# Patient Record
Sex: FEMALE | Race: WHITE | NOT HISPANIC OR LATINO | Employment: OTHER | URBAN - METROPOLITAN AREA
[De-identification: names, ages, dates, MRNs, and addresses within clinical notes are randomized per-mention and may not be internally consistent; named-entity substitution may affect disease eponyms.]

---

## 2017-03-03 ENCOUNTER — TELEPHONE (OUTPATIENT)
Dept: FAMILY MEDICINE CLINIC | Facility: CLINIC | Age: 82
End: 2017-03-03

## 2017-03-03 NOTE — TELEPHONE ENCOUNTER
Pt called on 3/1/17 i tried calling pt back and no answer. Pt called back on 3/3/17 and left another message wanting refills on medication. I tried calling her twice and no answer. Pt is needing appt to have medication refilled.

## 2017-03-08 ENCOUNTER — OFFICE VISIT (OUTPATIENT)
Dept: FAMILY MEDICINE CLINIC | Facility: CLINIC | Age: 82
End: 2017-03-08

## 2017-03-08 VITALS
HEART RATE: 64 BPM | SYSTOLIC BLOOD PRESSURE: 130 MMHG | DIASTOLIC BLOOD PRESSURE: 72 MMHG | BODY MASS INDEX: 23.92 KG/M2 | OXYGEN SATURATION: 97 % | TEMPERATURE: 97.5 F | RESPIRATION RATE: 16 BRPM | HEIGHT: 63 IN | WEIGHT: 135 LBS

## 2017-03-08 DIAGNOSIS — I10 ESSENTIAL HYPERTENSION: Primary | ICD-10-CM

## 2017-03-08 DIAGNOSIS — F41.9 ANXIETY: ICD-10-CM

## 2017-03-08 DIAGNOSIS — E03.9 HYPOTHYROIDISM, UNSPECIFIED TYPE: ICD-10-CM

## 2017-03-08 DIAGNOSIS — R79.89 ELEVATED PTHRP LEVEL: ICD-10-CM

## 2017-03-08 DIAGNOSIS — E83.52 CALCIUM BLOOD INCREASED: ICD-10-CM

## 2017-03-08 DIAGNOSIS — E04.1 THYROID NODULE: ICD-10-CM

## 2017-03-08 DIAGNOSIS — E78.2 MIXED HYPERLIPIDEMIA: ICD-10-CM

## 2017-03-08 PROCEDURE — 99213 OFFICE O/P EST LOW 20 MIN: CPT | Performed by: PHYSICIAN ASSISTANT

## 2017-03-08 RX ORDER — PAROXETINE 10 MG/1
10 TABLET, FILM COATED ORAL EVERY MORNING
Qty: 90 TABLET | Refills: 1 | Status: SHIPPED | OUTPATIENT
Start: 2017-03-08 | End: 2017-06-16 | Stop reason: SDUPTHER

## 2017-03-08 RX ORDER — LEVOTHYROXINE SODIUM 0.03 MG/1
25 TABLET ORAL DAILY
Qty: 90 TABLET | Refills: 1 | Status: SHIPPED | OUTPATIENT
Start: 2017-03-08 | End: 2017-03-09

## 2017-03-08 RX ORDER — AMLODIPINE BESYLATE 5 MG/1
5 TABLET ORAL DAILY
Qty: 90 TABLET | Refills: 1 | Status: SHIPPED | OUTPATIENT
Start: 2017-03-08 | End: 2017-06-07 | Stop reason: SDUPTHER

## 2017-03-08 RX ORDER — PRAVASTATIN SODIUM 20 MG
20 TABLET ORAL DAILY
Qty: 90 TABLET | Refills: 1 | Status: SHIPPED | OUTPATIENT
Start: 2017-03-08 | End: 2017-06-16 | Stop reason: SDUPTHER

## 2017-03-08 NOTE — PATIENT INSTRUCTIONS
Low glycemic index diet  Exercise 30 minutes most days of the week  Make sure you get results on any labs or tests we ordered today  We discussed medications and how to take them as prescribed  Sleep 6-8 hours each night if possible  If you have not signed up for [employer], please activate your code ASAP from your After Visit Summary today    LDL goal <100  LDL goal if heart disease <70  HDL goal >60  Triglyceride goal <150  BP goal =<130/80  Fasting glucose <100    Labs today  Update thyroid u/s

## 2017-03-08 NOTE — PROGRESS NOTES
Subjective   Yolis Ospina is a 87 y.o. female.     History of Present Illness   Yolis Ospina 87 y.o. female who presents today for routine follow up check and medication refills.  she has a history of   Patient Active Problem List   Diagnosis   • Fatigue   • Calcium blood increased   • Vitamin D deficiency   • Thyroid nodule   • Essential hypertension   • Hypothyroidism   • Hyperlipidemia   • Palpitation   • Anxiety   .  Since the last visit, she has overall felt well.  She has been out of all meds for days.  She needs refills..  she has been compliant with current medications have reviewed them.  The patient denies medication side effects.  Lab Results   Component Value Date    TSH 3.440 09/02/2016     Lab Results   Component Value Date    BUN 20 09/02/2016    CREATININE 0.88 09/02/2016    EGFRIFNONA 60 09/02/2016    EGFRIFAFRI 69 09/02/2016    BCR 23 09/02/2016    K 4.8 09/02/2016    CO2 21 09/02/2016    CALCIUM 10.7 (H) 09/02/2016    PROTENTOTREF 7.2 09/02/2016    ALBUMIN 4.2 09/02/2016    LABIL2 1.4 09/02/2016    AST 17 09/02/2016    ALT 14 09/02/2016   I will update Ca and get PTH  She has known thyroid nodules      IMPRESSION:  1. Bilateral thyroid nodules. The nodules on the right appear small and  relatively stable since 07/16/2014 study.  2. The largest nodule seen on the left measuring 3.6 cm in greatest  dimension. This has increased slightly in size from the previous study  of 07/16/2014. This nodule was biopsied using ultrasound guidance on  07/29/2014.    She will need f/u on the thyroid  No results found for: CHOL  Lab Results   Component Value Date    TRIG 127 09/02/2016    TRIG 173 (H) 03/02/2016     Lab Results   Component Value Date    HDL 68 09/02/2016    HDL 71 (H) 03/02/2016     No results found for: LDLCALC  Lab Results   Component Value Date     (H) 09/02/2016     (H) 03/02/2016     No results found for: HDLLDLRATIO  No components found for: CHOLHDL      Yolis Ospina female  87 y.o. who presents today for follow up of Anxiety.  She reports medication is working well, patient desires to continue on Rx, and needs refill. Onset of symptoms was approximately several years ago.  She denies current suicidal and homicidal ideation. Risk factors are lifestyle of multiple roles.  Previous treatment includes current Rx.  She complains of the following medication side effects: none.  The patient declines to go to counseling..    She declines GI work up    The following portions of the patient's history were reviewed and updated as appropriate: allergies, current medications, past family history, past medical history, past social history, past surgical history and problem list.    Review of Systems   Constitutional: Positive for fatigue. Negative for activity change, appetite change and unexpected weight change.   HENT: Positive for sinus pressure and tinnitus. Negative for nosebleeds and trouble swallowing.    Eyes: Negative for pain and visual disturbance.   Respiratory: Negative for chest tightness, shortness of breath and wheezing.    Cardiovascular: Negative for chest pain and palpitations.   Gastrointestinal: Positive for abdominal pain and rectal pain. Negative for blood in stool.   Endocrine: Positive for cold intolerance.   Genitourinary: Negative for difficulty urinating and hematuria.   Musculoskeletal: Positive for back pain. Negative for joint swelling.   Skin: Negative for color change and rash.   Allergic/Immunologic: Negative.    Neurological: Positive for dizziness. Negative for syncope and speech difficulty.   Hematological: Negative for adenopathy.   Psychiatric/Behavioral: Negative for agitation and confusion.   All other systems reviewed and are negative.      Objective   Physical Exam   Constitutional: She is oriented to person, place, and time. She appears well-developed and well-nourished. No distress.   HENT:   Head: Normocephalic and atraumatic.   Eyes: Conjunctivae and  EOM are normal. Pupils are equal, round, and reactive to light. Right eye exhibits no discharge. Left eye exhibits no discharge. No scleral icterus.   Neck: Normal range of motion. Neck supple. No tracheal deviation present. No thyromegaly present.   Cardiovascular: Normal rate, regular rhythm, normal heart sounds, intact distal pulses and normal pulses.  Exam reveals no gallop.    No murmur heard.  Pulmonary/Chest: Effort normal and breath sounds normal. No respiratory distress. She has no wheezes. She has no rales.   Musculoskeletal: Normal range of motion.   Neurological: She is alert and oriented to person, place, and time. She exhibits normal muscle tone. Coordination normal.   Skin: Skin is warm. No rash noted. No erythema. No pallor.   Psychiatric: She has a normal mood and affect. Her behavior is normal. Judgment and thought content normal.   Nursing note and vitals reviewed.      Assessment/Plan   Problems Addressed this Visit        Cardiovascular and Mediastinum    Essential hypertension - Primary    Relevant Medications    amLODIPine (NORVASC) 5 MG tablet    Other Relevant Orders    Comprehensive Metabolic Panel    T4, Free    TSH    PTH, Intact    CBC & Differential    Calcium, Ionized    Hyperlipidemia    Relevant Medications    pravastatin (PRAVACHOL) 20 MG tablet    Other Relevant Orders    Comprehensive Metabolic Panel    T4, Free    TSH    PTH, Intact    CBC & Differential    Calcium, Ionized       Endocrine    Thyroid nodule    Relevant Medications    levothyroxine (SYNTHROID, LEVOTHROID) 25 MCG tablet    Other Relevant Orders    Ambulatory Referral to ENT (Otolaryngology)    Comprehensive Metabolic Panel    T4, Free    TSH    PTH, Intact    CBC & Differential    Calcium, Ionized    Hypothyroidism    Relevant Medications    levothyroxine (SYNTHROID, LEVOTHROID) 25 MCG tablet    Other Relevant Orders    Ambulatory Referral to ENT (Otolaryngology)    Comprehensive Metabolic Panel    T4, Free    TSH     PTH, Intact    CBC & Differential    Calcium, Ionized       Other    Calcium blood increased    Relevant Orders    Comprehensive Metabolic Panel    T4, Free    TSH    PTH, Intact    CBC & Differential    Calcium, Ionized    Anxiety    Relevant Medications    PARoxetine (PAXIL) 10 MG tablet    Other Relevant Orders    Comprehensive Metabolic Panel    T4, Free    TSH    PTH, Intact    CBC & Differential    Calcium, Ionized

## 2017-03-09 LAB
ALBUMIN SERPL-MCNC: 4.3 G/DL (ref 3.5–5.2)
ALBUMIN/GLOB SERPL: 1.4 G/DL
ALP SERPL-CCNC: 108 U/L (ref 39–117)
ALT SERPL-CCNC: 12 U/L (ref 1–33)
AST SERPL-CCNC: 17 U/L (ref 1–32)
BASOPHILS # BLD AUTO: 0.01 10*3/MM3 (ref 0–0.2)
BASOPHILS NFR BLD AUTO: 0.2 % (ref 0–1.5)
BILIRUB SERPL-MCNC: 0.5 MG/DL (ref 0.1–1.2)
BUN SERPL-MCNC: 15 MG/DL (ref 8–23)
BUN/CREAT SERPL: 17.4 (ref 7–25)
CA-I SERPL ISE-MCNC: 6.3 MG/DL (ref 4.5–5.6)
CALCIUM SERPL-MCNC: 11 MG/DL (ref 8.6–10.5)
CHLORIDE SERPL-SCNC: 109 MMOL/L (ref 98–107)
CO2 SERPL-SCNC: 24.2 MMOL/L (ref 22–29)
CREAT SERPL-MCNC: 0.86 MG/DL (ref 0.57–1)
EOSINOPHIL # BLD AUTO: 0.09 10*3/MM3 (ref 0–0.7)
EOSINOPHIL NFR BLD AUTO: 1.7 % (ref 0.3–6.2)
ERYTHROCYTE [DISTWIDTH] IN BLOOD BY AUTOMATED COUNT: 14 % (ref 11.7–13)
GLOBULIN SER CALC-MCNC: 3 GM/DL
GLUCOSE SERPL-MCNC: 93 MG/DL (ref 65–99)
HCT VFR BLD AUTO: 42.8 % (ref 35.6–45.5)
HGB BLD-MCNC: 14 G/DL (ref 11.9–15.5)
IMM GRANULOCYTES # BLD: 0 10*3/MM3 (ref 0–0.03)
IMM GRANULOCYTES NFR BLD: 0 % (ref 0–0.5)
LYMPHOCYTES # BLD AUTO: 1.14 10*3/MM3 (ref 0.9–4.8)
LYMPHOCYTES NFR BLD AUTO: 21.4 % (ref 19.6–45.3)
MCH RBC QN AUTO: 30.7 PG (ref 26.9–32)
MCHC RBC AUTO-ENTMCNC: 32.7 G/DL (ref 32.4–36.3)
MCV RBC AUTO: 93.9 FL (ref 80.5–98.2)
MONOCYTES # BLD AUTO: 0.31 10*3/MM3 (ref 0.2–1.2)
MONOCYTES NFR BLD AUTO: 5.8 % (ref 5–12)
NEUTROPHILS # BLD AUTO: 3.78 10*3/MM3 (ref 1.9–8.1)
NEUTROPHILS NFR BLD AUTO: 70.9 % (ref 42.7–76)
PLATELET # BLD AUTO: 242 10*3/MM3 (ref 140–500)
POTASSIUM SERPL-SCNC: 5 MMOL/L (ref 3.5–5.2)
PROT SERPL-MCNC: 7.3 G/DL (ref 6–8.5)
PTH-INTACT SERPL-MCNC: 100 PG/ML (ref 15–65)
RBC # BLD AUTO: 4.56 10*6/MM3 (ref 3.9–5.2)
SODIUM SERPL-SCNC: 145 MMOL/L (ref 136–145)
T4 FREE SERPL-MCNC: 0.88 NG/DL (ref 0.93–1.7)
TSH SERPL DL<=0.005 MIU/L-ACNC: 4.88 MIU/ML (ref 0.27–4.2)
WBC # BLD AUTO: 5.33 10*3/MM3 (ref 4.5–10.7)

## 2017-03-09 RX ORDER — LEVOTHYROXINE SODIUM 0.05 MG/1
50 TABLET ORAL DAILY
Qty: 90 TABLET | Refills: 3 | Status: SHIPPED | OUTPATIENT
Start: 2017-03-09 | End: 2017-03-10 | Stop reason: SDUPTHER

## 2017-03-10 ENCOUNTER — TELEPHONE (OUTPATIENT)
Dept: ENDOCRINOLOGY | Age: 82
End: 2017-03-10

## 2017-03-10 DIAGNOSIS — E04.1 THYROID NODULE: ICD-10-CM

## 2017-03-10 DIAGNOSIS — E83.52 CALCIUM BLOOD INCREASED: ICD-10-CM

## 2017-03-10 DIAGNOSIS — R79.89 ELEVATED PTHRP LEVEL: ICD-10-CM

## 2017-03-10 RX ORDER — LEVOTHYROXINE SODIUM 0.05 MG/1
50 TABLET ORAL DAILY
Qty: 90 TABLET | Refills: 3 | Status: SHIPPED | OUTPATIENT
Start: 2017-03-10 | End: 2017-10-06

## 2017-03-10 NOTE — TELEPHONE ENCOUNTER
I spoke with patient and let her know that Teresa recommended that pt takes what Tonya Malcolm prescribed. I also made her aware that she needs her parathyroid checked. Patient stated that she has transportation issues and will have to call back.

## 2017-06-07 DIAGNOSIS — I10 ESSENTIAL HYPERTENSION: ICD-10-CM

## 2017-06-07 RX ORDER — AMLODIPINE BESYLATE 5 MG/1
5 TABLET ORAL DAILY
Qty: 90 TABLET | Refills: 1 | Status: SHIPPED | OUTPATIENT
Start: 2017-06-07 | End: 2017-08-04 | Stop reason: SDUPTHER

## 2017-06-16 ENCOUNTER — TELEPHONE (OUTPATIENT)
Dept: FAMILY MEDICINE CLINIC | Facility: CLINIC | Age: 82
End: 2017-06-16

## 2017-06-16 DIAGNOSIS — E78.2 MIXED HYPERLIPIDEMIA: ICD-10-CM

## 2017-06-16 DIAGNOSIS — F41.9 ANXIETY: ICD-10-CM

## 2017-06-16 RX ORDER — PRAVASTATIN SODIUM 20 MG
20 TABLET ORAL DAILY
Qty: 90 TABLET | Refills: 0 | Status: SHIPPED | OUTPATIENT
Start: 2017-06-16 | End: 2017-08-04 | Stop reason: SDUPTHER

## 2017-06-16 RX ORDER — PAROXETINE 10 MG/1
10 TABLET, FILM COATED ORAL EVERY MORNING
Qty: 90 TABLET | Refills: 0 | Status: SHIPPED | OUTPATIENT
Start: 2017-06-16 | End: 2017-08-04 | Stop reason: SDUPTHER

## 2017-08-04 ENCOUNTER — OFFICE VISIT (OUTPATIENT)
Dept: FAMILY MEDICINE CLINIC | Facility: CLINIC | Age: 82
End: 2017-08-04

## 2017-08-04 VITALS
TEMPERATURE: 98.7 F | OXYGEN SATURATION: 99 % | RESPIRATION RATE: 16 BRPM | SYSTOLIC BLOOD PRESSURE: 132 MMHG | WEIGHT: 136 LBS | DIASTOLIC BLOOD PRESSURE: 64 MMHG | HEART RATE: 62 BPM | HEIGHT: 63 IN | BODY MASS INDEX: 24.1 KG/M2

## 2017-08-04 DIAGNOSIS — E78.2 MIXED HYPERLIPIDEMIA: ICD-10-CM

## 2017-08-04 DIAGNOSIS — E03.9 HYPOTHYROIDISM, UNSPECIFIED TYPE: Primary | ICD-10-CM

## 2017-08-04 DIAGNOSIS — I10 ESSENTIAL HYPERTENSION: ICD-10-CM

## 2017-08-04 DIAGNOSIS — F41.9 ANXIETY: ICD-10-CM

## 2017-08-04 PROCEDURE — 99214 OFFICE O/P EST MOD 30 MIN: CPT | Performed by: FAMILY MEDICINE

## 2017-08-04 RX ORDER — PAROXETINE 10 MG/1
10 TABLET, FILM COATED ORAL EVERY MORNING
Qty: 90 TABLET | Refills: 1 | Status: SHIPPED | OUTPATIENT
Start: 2017-08-04 | End: 2018-03-23 | Stop reason: SDUPTHER

## 2017-08-04 RX ORDER — AMLODIPINE BESYLATE 5 MG/1
5 TABLET ORAL DAILY
Qty: 90 TABLET | Refills: 1 | Status: SHIPPED | OUTPATIENT
Start: 2017-08-04 | End: 2018-03-01 | Stop reason: SDUPTHER

## 2017-08-04 RX ORDER — PRAVASTATIN SODIUM 20 MG
20 TABLET ORAL DAILY
Qty: 90 TABLET | Refills: 1 | Status: SHIPPED | OUTPATIENT
Start: 2017-08-04 | End: 2018-03-23 | Stop reason: SDUPTHER

## 2017-08-04 NOTE — PROGRESS NOTES
"Subjective   Yolis Ospina is a 87 y.o. female.     History of Present Illness   Chief Complaint:   Chief Complaint   Patient presents with   • Hypertension   • Hyperlipidemia   • Anxiety       Yolis Ospina 87 y.o. female who presents today for Medical Management of the below listed issues and medication refills. She did not follow thru  Directions with elizabeth and she was actually  Not taking synthroid. Never saw dr pratt. Refill meds  Repeat labs.  she has a history of   Patient Active Problem List   Diagnosis   • Fatigue   • Calcium blood increased   • Vitamin D deficiency   • Thyroid nodule   • Essential hypertension   • Hypothyroidism   • Hyperlipidemia   • Palpitation   • Anxiety   .  Since the last visit, she has overall felt well.  she has been compliant with   Current Outpatient Prescriptions:   •  ALPRAZolam (XANAX) 0.25 MG tablet, Take 0.25 mg by mouth at night as needed for anxiety., Disp: , Rfl:   •  amLODIPine (NORVASC) 5 MG tablet, Take 1 tablet by mouth Daily. For BP, Disp: 90 tablet, Rfl: 1  •  levothyroxine (SYNTHROID) 50 MCG tablet, Take 1 tablet by mouth Daily. For thyroid before breakfast; dose change, Disp: 90 tablet, Rfl: 3  •  PARoxetine (PAXIL) 10 MG tablet, Take 1 tablet by mouth Every Morning. For stress, Disp: 90 tablet, Rfl: 0  •  pravastatin (PRAVACHOL) 20 MG tablet, Take 1 tablet by mouth Daily. For cholesterol, Disp: 90 tablet, Rfl: 0.  she denies medication side effects.    All of the chronic condition(s) listed above are stable w/o issues.    /64  Pulse 62  Temp 98.7 °F (37.1 °C) (Oral)   Resp 16  Ht 62.5\" (158.8 cm)  Wt 136 lb (61.7 kg)  SpO2 99%  BMI 24.48 kg/m2    The following portions of the patient's history were reviewed and updated as appropriate: allergies, current medications, past family history, past medical history, past social history, past surgical history and problem list.    Review of Systems   Constitutional: Negative for activity change, appetite " change and unexpected weight change.   Eyes: Negative for visual disturbance.   Respiratory: Negative for chest tightness and shortness of breath.    Cardiovascular: Negative for chest pain and palpitations.   Skin: Negative for color change.   Neurological: Negative for syncope and speech difficulty.   Psychiatric/Behavioral: Negative for confusion and decreased concentration.       Objective   Physical Exam   Constitutional: She appears well-developed and well-nourished.   HENT:   Head: Normocephalic.   Eyes: EOM are normal. Pupils are equal, round, and reactive to light.   Neck: Normal range of motion. Neck supple. No thyromegaly present.   Cardiovascular: Normal rate and regular rhythm.    Pulmonary/Chest: Effort normal and breath sounds normal.   Abdominal: Soft.   Musculoskeletal: Normal range of motion.   Psychiatric: She has a normal mood and affect. Her behavior is normal.   Nursing note reviewed.      Assessment/Plan   Yolis was seen today for hypertension, hyperlipidemia and anxiety.    Diagnoses and all orders for this visit:    Hypothyroidism, unspecified type  -     Comprehensive metabolic panel  -     Lipid panel  -     CBC and Differential  -     TSH  -     PTH, Intact & Calcium    Anxiety  -     PARoxetine (PAXIL) 10 MG tablet; Take 1 tablet by mouth Every Morning. For stress  -     Comprehensive metabolic panel  -     Lipid panel  -     CBC and Differential  -     TSH  -     PTH, Intact & Calcium    Mixed hyperlipidemia  -     pravastatin (PRAVACHOL) 20 MG tablet; Take 1 tablet by mouth Daily. For cholesterol  -     Comprehensive metabolic panel  -     Lipid panel  -     CBC and Differential  -     TSH  -     PTH, Intact & Calcium    Essential hypertension  -     amLODIPine (NORVASC) 5 MG tablet; Take 1 tablet by mouth Daily. For BP  -     Comprehensive metabolic panel  -     Lipid panel  -     CBC and Differential  -     TSH  -     PTH, Intact & Calcium

## 2017-08-05 LAB
ALBUMIN SERPL-MCNC: 4.5 G/DL (ref 3.5–5.2)
ALBUMIN/GLOB SERPL: 1.5 G/DL
ALP SERPL-CCNC: 108 U/L (ref 39–117)
ALT SERPL-CCNC: 13 U/L (ref 1–33)
AST SERPL-CCNC: 17 U/L (ref 1–32)
BASOPHILS # BLD AUTO: 0.01 10*3/MM3 (ref 0–0.2)
BASOPHILS NFR BLD AUTO: 0.2 % (ref 0–1.5)
BILIRUB SERPL-MCNC: 0.6 MG/DL (ref 0.1–1.2)
BUN SERPL-MCNC: 18 MG/DL (ref 8–23)
BUN/CREAT SERPL: 19.6 (ref 7–25)
CALCIUM SERPL-MCNC: 11.2 MG/DL (ref 8.6–10.5)
CHLORIDE SERPL-SCNC: 109 MMOL/L (ref 98–107)
CHOLEST SERPL-MCNC: 193 MG/DL (ref 0–200)
CO2 SERPL-SCNC: 23 MMOL/L (ref 22–29)
CREAT SERPL-MCNC: 0.92 MG/DL (ref 0.57–1)
EOSINOPHIL # BLD AUTO: 0.1 10*3/MM3 (ref 0–0.7)
EOSINOPHIL NFR BLD AUTO: 1.9 % (ref 0.3–6.2)
ERYTHROCYTE [DISTWIDTH] IN BLOOD BY AUTOMATED COUNT: 14 % (ref 11.7–13)
GLOBULIN SER CALC-MCNC: 3.1 GM/DL
GLUCOSE SERPL-MCNC: 95 MG/DL (ref 65–99)
HCT VFR BLD AUTO: 43.9 % (ref 35.6–45.5)
HDLC SERPL-MCNC: 74 MG/DL (ref 40–60)
HGB BLD-MCNC: 13.5 G/DL (ref 11.9–15.5)
IMM GRANULOCYTES # BLD: 0 10*3/MM3 (ref 0–0.03)
IMM GRANULOCYTES NFR BLD: 0 % (ref 0–0.5)
INTACT PTH: ABNORMAL
LDLC SERPL CALC-MCNC: 95 MG/DL (ref 0–100)
LYMPHOCYTES # BLD AUTO: 1.43 10*3/MM3 (ref 0.9–4.8)
LYMPHOCYTES NFR BLD AUTO: 27.1 % (ref 19.6–45.3)
MCH RBC QN AUTO: 29.7 PG (ref 26.9–32)
MCHC RBC AUTO-ENTMCNC: 30.8 G/DL (ref 32.4–36.3)
MCV RBC AUTO: 96.7 FL (ref 80.5–98.2)
MONOCYTES # BLD AUTO: 0.47 10*3/MM3 (ref 0.2–1.2)
MONOCYTES NFR BLD AUTO: 8.9 % (ref 5–12)
NEUTROPHILS # BLD AUTO: 3.26 10*3/MM3 (ref 1.9–8.1)
NEUTROPHILS NFR BLD AUTO: 61.9 % (ref 42.7–76)
PLATELET # BLD AUTO: 242 10*3/MM3 (ref 140–500)
POTASSIUM SERPL-SCNC: 4.7 MMOL/L (ref 3.5–5.2)
PROT SERPL-MCNC: 7.6 G/DL (ref 6–8.5)
PTH-INTACT SERPL-MCNC: 102 PG/ML (ref 15–65)
RBC # BLD AUTO: 4.54 10*6/MM3 (ref 3.9–5.2)
SODIUM SERPL-SCNC: 145 MMOL/L (ref 136–145)
TRIGL SERPL-MCNC: 118 MG/DL (ref 0–150)
TSH SERPL DL<=0.005 MIU/L-ACNC: 4.05 MIU/ML (ref 0.27–4.2)
VLDLC SERPL CALC-MCNC: 23.6 MG/DL (ref 5–40)
WBC # BLD AUTO: 5.27 10*3/MM3 (ref 4.5–10.7)

## 2017-08-15 ENCOUNTER — TELEPHONE (OUTPATIENT)
Dept: FAMILY MEDICINE CLINIC | Facility: CLINIC | Age: 82
End: 2017-08-15

## 2017-08-16 DIAGNOSIS — E04.1 THYROID NODULE: ICD-10-CM

## 2017-08-16 DIAGNOSIS — E83.52 CALCIUM BLOOD INCREASED: ICD-10-CM

## 2017-08-16 DIAGNOSIS — E03.9 HYPOTHYROIDISM, UNSPECIFIED TYPE: Primary | ICD-10-CM

## 2017-08-16 NOTE — TELEPHONE ENCOUNTER
Tell her her calcium is high and her pth is high and if she wants me to discuss with she can make appointment but I would refer her to dr valdez for consult. Can give her rest of lab results.

## 2017-10-06 ENCOUNTER — OFFICE VISIT (OUTPATIENT)
Dept: ENDOCRINOLOGY | Age: 82
End: 2017-10-06

## 2017-10-06 VITALS
HEIGHT: 63 IN | WEIGHT: 138 LBS | SYSTOLIC BLOOD PRESSURE: 124 MMHG | BODY MASS INDEX: 24.45 KG/M2 | DIASTOLIC BLOOD PRESSURE: 70 MMHG

## 2017-10-06 DIAGNOSIS — E78.2 MIXED HYPERLIPIDEMIA: ICD-10-CM

## 2017-10-06 DIAGNOSIS — E83.52 CALCIUM BLOOD INCREASED: Primary | ICD-10-CM

## 2017-10-06 DIAGNOSIS — E21.0 PRIMARY HYPERPARATHYROIDISM (HCC): ICD-10-CM

## 2017-10-06 DIAGNOSIS — E04.1 THYROID NODULE: ICD-10-CM

## 2017-10-06 DIAGNOSIS — R53.82 CHRONIC FATIGUE: ICD-10-CM

## 2017-10-06 DIAGNOSIS — E55.9 VITAMIN D DEFICIENCY: ICD-10-CM

## 2017-10-06 DIAGNOSIS — E06.3 HYPOTHYROIDISM DUE TO HASHIMOTO'S THYROIDITIS: ICD-10-CM

## 2017-10-06 DIAGNOSIS — I10 ESSENTIAL HYPERTENSION: ICD-10-CM

## 2017-10-06 DIAGNOSIS — E03.8 HYPOTHYROIDISM DUE TO HASHIMOTO'S THYROIDITIS: ICD-10-CM

## 2017-10-06 PROCEDURE — 99204 OFFICE O/P NEW MOD 45 MIN: CPT | Performed by: INTERNAL MEDICINE

## 2017-10-06 RX ORDER — LEVOTHYROXINE SODIUM 75 UG/1
75 TABLET ORAL DAILY
Qty: 30 TABLET | Refills: 5 | Status: SHIPPED | OUTPATIENT
Start: 2017-10-06 | End: 2017-10-09 | Stop reason: SDUPTHER

## 2017-10-06 NOTE — PROGRESS NOTES
"Subjective   Yolis Ospina is a 87 y.o. female is here as a new patient increased calcium in blood. Lab review. Patient states that she is fatigued constantly. /70  Ht 62.5\" (158.8 cm)  Wt 138 lb (62.6 kg)  BMI 24.84 kg/m2  Allergies   Allergen Reactions   • Iodinated Diagnostic Agents        Current Outpatient Prescriptions:   •  ALPRAZolam (XANAX) 0.25 MG tablet, Take 0.25 mg by mouth at night as needed for anxiety., Disp: , Rfl:   •  amLODIPine (NORVASC) 5 MG tablet, Take 1 tablet by mouth Daily. For BP, Disp: 90 tablet, Rfl: 1  •  levothyroxine (SYNTHROID) 50 MCG tablet, Take 1 tablet by mouth Daily. For thyroid before breakfast; dose change, Disp: 90 tablet, Rfl: 3  •  PARoxetine (PAXIL) 10 MG tablet, Take 1 tablet by mouth Every Morning. For stress, Disp: 90 tablet, Rfl: 1  •  pravastatin (PRAVACHOL) 20 MG tablet, Take 1 tablet by mouth Daily. For cholesterol, Disp: 90 tablet, Rfl: 1      History of Present Illness this is an 87-year-old female known patient with hypertension and dyslipidemia as well as hypothyroidism and has been found to have elevated levels of calcium home and parathyroid levels.  She is also known patient with thyroid nodule.  Her main complaint is pronounced and progressive fatigue and listlessness.  She also admits to not being compliant with her thyroid medication and occasionally wonders whether or not she needs it.    The following portions of the patient's history were reviewed and updated as appropriate: allergies, current medications, past family history, past medical history, past social history, past surgical history and problem list.    Review of Systems   Constitutional: Positive for fatigue.   HENT: Negative for trouble swallowing.    Eyes: Negative for visual disturbance.   Respiratory: Negative for shortness of breath.    Cardiovascular: Negative for leg swelling.   Endocrine: Negative for polyuria.   Skin: Negative for wound.   Neurological: Negative for numbness. "       Objective   Physical Exam   Constitutional: She is oriented to person, place, and time. She appears well-developed and well-nourished. No distress.   HENT:   Head: Normocephalic and atraumatic.   Right Ear: External ear normal.   Left Ear: External ear normal.   Nose: Nose normal.   Mouth/Throat: Oropharynx is clear and moist. No oropharyngeal exudate.   Eyes: Conjunctivae and EOM are normal. Pupils are equal, round, and reactive to light. Right eye exhibits no discharge. Left eye exhibits no discharge. No scleral icterus.   Neck: Normal range of motion. Neck supple.   Bilateral nodularity in both lobes of her thyroid.  The entire goiter moves freely with swallowing with no tenderness.  There are no lymphadenopathy in the anterior or posterior cervical as well as supraclavicular area .   Cardiovascular: Normal rate, regular rhythm, normal heart sounds and intact distal pulses.  Exam reveals no gallop and no friction rub.    No murmur heard.  Pulmonary/Chest: Effort normal and breath sounds normal. No respiratory distress. She has no wheezes. She has no rales. She exhibits no tenderness.   Abdominal: Soft. Bowel sounds are normal. She exhibits no distension and no mass. There is no tenderness. There is no rebound and no guarding. No hernia.   Musculoskeletal: Normal range of motion. She exhibits no edema, tenderness or deformity.   Neurological: She is alert and oriented to person, place, and time. She has normal reflexes. She displays normal reflexes. No cranial nerve deficit. She exhibits normal muscle tone. Coordination normal.   Skin: Skin is warm and dry. No rash noted. She is not diaphoretic. No erythema. No pallor.   Psychiatric: She has a normal mood and affect. Her behavior is normal. Judgment and thought content normal.   Nursing note and vitals reviewed.        Assessment/Plan   Diagnoses and all orders for this visit:    Calcium blood increased  -     T3, Free  -     T4 & TSH ([facility])  -     T4,  Free  -     Uric Acid  -     Vitamin D 25 Hydroxy  -     Comprehensive Metabolic Panel  -     Lipid Panel  -     Calcium, Ionized  -     Phosphorus  -     PTH, Intact  -     ACTH  -     Cortisol  -     NM Parathyroid Scan  -     Comprehensive Thyroglobulin  -     Thyroglobulin With Anti-TG    Chronic fatigue  -     T3, Free  -     T4 & TSH ([facility])  -     T4, Free  -     Uric Acid  -     Vitamin D 25 Hydroxy  -     Comprehensive Metabolic Panel  -     Lipid Panel  -     Calcium, Ionized  -     Phosphorus  -     PTH, Intact  -     ACTH  -     Cortisol  -     Comprehensive Thyroglobulin  -     Thyroglobulin With Anti-TG    Primary hyperparathyroidism  -     T3, Free  -     T4 & TSH ([facility])  -     T4, Free  -     Uric Acid  -     Vitamin D 25 Hydroxy  -     Comprehensive Metabolic Panel  -     Lipid Panel  -     Calcium, Ionized  -     Phosphorus  -     PTH, Intact  -     ACTH  -     Cortisol  -     NM Parathyroid Scan  -     Comprehensive Thyroglobulin  -     Thyroglobulin With Anti-TG    Hypothyroidism due to Hashimoto's thyroiditis  -     T3, Free  -     T4 & TSH ([facility])  -     T4, Free  -     Uric Acid  -     Vitamin D 25 Hydroxy  -     Comprehensive Metabolic Panel  -     Lipid Panel  -     Calcium, Ionized  -     Phosphorus  -     PTH, Intact  -     ACTH  -     Cortisol  -     Comprehensive Thyroglobulin  -     Thyroglobulin With Anti-TG    Essential hypertension  -     T3, Free  -     T4 & TSH ([facility])  -     T4, Free  -     Uric Acid  -     Vitamin D 25 Hydroxy  -     Comprehensive Metabolic Panel  -     Lipid Panel  -     Calcium, Ionized  -     Phosphorus  -     PTH, Intact  -     ACTH  -     Cortisol  -     Comprehensive Thyroglobulin  -     Thyroglobulin With Anti-TG    Mixed hyperlipidemia  -     T3, Free  -     T4 & TSH ([facility])  -     T4, Free  -     Uric Acid  -     Vitamin D 25 Hydroxy  -     Comprehensive Metabolic Panel  -     Lipid Panel  -     Calcium, Ionized  -     Phosphorus  -     PTH,  Intact  -     ACTH  -     Cortisol  -     Comprehensive Thyroglobulin  -     Thyroglobulin With Anti-TG    Vitamin D deficiency  -     T3, Free  -     T4 & TSH ([facility])  -     T4, Free  -     Uric Acid  -     Vitamin D 25 Hydroxy  -     Comprehensive Metabolic Panel  -     Lipid Panel  -     Calcium, Ionized  -     Phosphorus  -     PTH, Intact  -     ACTH  -     Cortisol  -     Comprehensive Thyroglobulin  -     Thyroglobulin With Anti-TG    Thyroid nodule  -     T3, Free  -     T4 & TSH ([facility])  -     T4, Free  -     Uric Acid  -     Vitamin D 25 Hydroxy  -     Comprehensive Metabolic Panel  -     Lipid Panel  -     Calcium, Ionized  -     Phosphorus  -     PTH, Intact  -     ACTH  -     Cortisol  -     Comprehensive Thyroglobulin  -     Thyroglobulin With Anti-TG    Other orders  -     UNITHROID 75 MCG tablet; Take 1 tablet by mouth Daily.               In summary I saw and examined this 87-year-old female for above-mentioned problems.  I reviewed her laboratory evaluations of 03/08/2017 as well as 08/04/2017.  I also reviewed her ultrasound of her thyroid dated head bed 07/29/2014 and find her aspiration biopsy which reported to be benign.  I also reviewed her thyroid ultrasound of the 03/22/2016 which essentially reported stable thyroid nodules.  Because of her being somewhat in denial and have a very short attention span I had to ask her niece who drove her to this office visit to come in so I was able to explain what hypothyroidism as well as hyperparathyroidism and hypercalcemia and and the approaches to their treatment.  At this point however I am going to go ahead and order a more extensive laboratory evaluation as well as a par[facility]roid scan at [facility].  As soon as the results come back we will go ahead and call for any possible modification or new medications.  At this time I also gave her samples of and a prescription for Unithroid 75 µg daily.  She will see Ms. Ivette Rubio in 4 months  or sooner if needed with laboratory evaluation prior to each office visit.

## 2017-10-09 ENCOUNTER — TELEPHONE (OUTPATIENT)
Dept: ENDOCRINOLOGY | Age: 82
End: 2017-10-09

## 2017-10-09 LAB
25(OH)D3+25(OH)D2 SERPL-MCNC: 34.4 NG/ML (ref 30–100)
ACTH PLAS-MCNC: 17.7 PG/ML (ref 7.2–63.3)
ALBUMIN SERPL-MCNC: 4.7 G/DL (ref 3.5–5.2)
ALBUMIN/GLOB SERPL: 1.3 G/DL
ALP SERPL-CCNC: 102 U/L (ref 39–117)
ALT SERPL-CCNC: 12 U/L (ref 1–33)
AST SERPL-CCNC: 18 U/L (ref 1–32)
BILIRUB SERPL-MCNC: 0.6 MG/DL (ref 0.1–1.2)
BUN SERPL-MCNC: 17 MG/DL (ref 8–23)
BUN/CREAT SERPL: 19.3 (ref 7–25)
CA-I SERPL ISE-MCNC: 6.2 MG/DL (ref 4.5–5.6)
CALCIUM SERPL-MCNC: 11.3 MG/DL (ref 8.6–10.5)
CHLORIDE SERPL-SCNC: 106 MMOL/L (ref 98–107)
CHOLEST SERPL-MCNC: 209 MG/DL (ref 0–200)
CO2 SERPL-SCNC: 24.1 MMOL/L (ref 22–29)
CORTIS SERPL-MCNC: 7.4 UG/DL
CREAT SERPL-MCNC: 0.88 MG/DL (ref 0.57–1)
GLOBULIN SER CALC-MCNC: 3.5 GM/DL
GLUCOSE SERPL-MCNC: 96 MG/DL (ref 65–99)
HDLC SERPL-MCNC: 79 MG/DL (ref 40–60)
LDLC SERPL CALC-MCNC: 102 MG/DL (ref 0–100)
PHOSPHATE SERPL-MCNC: 2.6 MG/DL (ref 2.5–4.5)
POTASSIUM SERPL-SCNC: 4.7 MMOL/L (ref 3.5–5.2)
PROT SERPL-MCNC: 8.2 G/DL (ref 6–8.5)
PTH-INTACT SERPL-MCNC: 143 PG/ML (ref 15–65)
SODIUM SERPL-SCNC: 143 MMOL/L (ref 136–145)
T3FREE SERPL-MCNC: 3.2 PG/ML (ref 2–4.4)
T4 FREE SERPL-MCNC: 1.1 NG/DL (ref 0.93–1.7)
T4 SERPL-MCNC: 7.97 MCG/DL (ref 4.5–11.7)
THYROGLOB AB SERPL-ACNC: <1 IU/ML
THYROGLOB AB SERPL-ACNC: <1 IU/ML (ref 0–0.9)
THYROGLOB SERPL-MCNC: 155 NG/ML
THYROGLOB SERPL-MCNC: 161.5 NG/ML (ref 1.5–38.5)
THYROGLOB SERPL-MCNC: ABNORMAL NG/ML
TRIGL SERPL-MCNC: 140 MG/DL (ref 0–150)
TSH SERPL DL<=0.005 MIU/L-ACNC: 2.9 MIU/ML (ref 0.27–4.2)
URATE SERPL-MCNC: 6.1 MG/DL (ref 2.4–5.7)
VLDLC SERPL CALC-MCNC: 28 MG/DL (ref 5–40)

## 2017-10-09 RX ORDER — LEVOTHYROXINE SODIUM 0.07 MG/1
75 TABLET ORAL DAILY
Qty: 30 TABLET | Refills: 5 | Status: SHIPPED | OUTPATIENT
Start: 2017-10-09 | End: 2019-01-14 | Stop reason: SDUPTHER

## 2017-10-09 NOTE — TELEPHONE ENCOUNTER
----- Message from Jay Rivera MD sent at 10/6/2017  4:21 PM EDT -----  There are 2 choices one is to go to a different pharmacy but if patient is reluctant then she can go ahead and get the generic.  ----- Message -----     From: Eva Mckenzie MA     Sent: 10/6/2017  12:08 PM       To: Jay Rivera MD    Patient's pharmacy stated that they do not have the unithroid and was hoping to switch to generic. Please advise    Patient preferred to be switched to generic

## 2017-10-17 DIAGNOSIS — Z12.31 ENCOUNTER FOR SCREENING MAMMOGRAM FOR BREAST CANCER: Primary | ICD-10-CM

## 2017-11-08 DIAGNOSIS — E83.52 CALCIUM BLOOD INCREASED: ICD-10-CM

## 2017-11-08 DIAGNOSIS — E21.0 PRIMARY HYPERPARATHYROIDISM (HCC): Primary | ICD-10-CM

## 2017-11-10 ENCOUNTER — TELEPHONE (OUTPATIENT)
Dept: ENDOCRINOLOGY | Age: 82
End: 2017-11-10

## 2017-11-10 NOTE — TELEPHONE ENCOUNTER
----- Message from Jay Rivera MD sent at 11/8/2017  5:25 PM EST -----  I made a referral to Dr. Michelle because it is positive.  ----- Message -----     From: Lacie Everett MA     Sent: 11/8/2017   3:47 PM       To: Jay Rivera MD    Please review parathyroid scan. It has been scanned under Media.  ----- Message -----     From: Eva Mckenzie MA     Sent: 11/8/2017   3:18 PM       To: Lacie Everett MA        ----- Message -----     From: Tonya Juarez LPN     Sent: 11/8/2017  12:59 PM       To: Eva Mckenzie MA    This pt has called our office to get test results on her Parathyroid Scan. I did not see it in her chart, so I called [facility] and received the results. I had them scanned in her chart. Pt is very upset that she has not heard anything. Please give her a call regarding this.      Spoke to patient about results and referral. 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 Michelle and she would like to get a second opinion before going anywhere or having surgery. Mailed a copy of her results and also let her know that their office would call and if she did not want to be seen to let them know.

## 2017-11-14 ENCOUNTER — TELEPHONE (OUTPATIENT)
Dept: ENDOCRINOLOGY | Age: 82
End: 2017-11-14

## 2017-11-14 NOTE — TELEPHONE ENCOUNTER
----- Message from Jay Rivera MD sent at 11/13/2017  4:26 PM EST -----  This is very interesting.  She gets mad for the delay of knowing the result to which she says now she has known all along?  At this point the ball is in her court.  You really do not need to call her back unless she needs something.  ----- Message -----     From: aLcie Everett MA     Sent: 11/10/2017  12:56 PM       To: Jay Rivera MD    Patient states that she has had a parathyroid adenoma for years and she does not want to see Dr. Michelle or have surgery and she will get a second opinion before doing anything.  ----- Message -----     From: Jay Rivera MD     Sent: 11/8/2017   5:25 PM       To: Lacie Everett MA    I made a referral to Dr. Michelle because it is positive.  ----- Message -----     From: Lacie Everett MA     Sent: 11/8/2017   3:47 PM       To: Jay Rivera MD    Please review parathyroid scan. It has been scanned under Media.  ----- Message -----     From: Eva Mckenzie MA     Sent: 11/8/2017   3:18 PM       To: Lacie Everett MA        ----- Message -----     From: Tonya Juarez LPN     Sent: 11/8/2017  12:59 PM       To: Eva Mckenzie MA    This pt has called our office to get test results on her Parathyroid Scan. I did not see it in her chart, so I called [facility] and received the results. I had them scanned in her chart. Pt is very upset that she has not heard anything. Please give her a call regarding this.

## 2017-11-16 ENCOUNTER — TELEPHONE (OUTPATIENT)
Dept: FAMILY MEDICINE CLINIC | Facility: CLINIC | Age: 82
End: 2017-11-16

## 2017-11-21 NOTE — TELEPHONE ENCOUNTER
I let pt know  
I seriously doubt this and feel this might be more of a coincidence.  
Pt states that since she started the 75 mcg she has had diarrhea. She is wanting to know if the medication could be causing this. I let her know that I would send Dr Rivera a message.   
Include Location In Plan?: No
Detail Level: Simple

## 2018-03-01 DIAGNOSIS — I10 ESSENTIAL HYPERTENSION: ICD-10-CM

## 2018-03-01 RX ORDER — AMLODIPINE BESYLATE 5 MG/1
TABLET ORAL
Qty: 30 TABLET | Refills: 0 | Status: SHIPPED | OUTPATIENT
Start: 2018-03-01 | End: 2018-03-23 | Stop reason: SDUPTHER

## 2018-03-08 DIAGNOSIS — R19.8 CHANGE IN BOWEL MOVEMENT: Primary | ICD-10-CM

## 2018-03-23 DIAGNOSIS — F41.9 ANXIETY: ICD-10-CM

## 2018-03-23 DIAGNOSIS — E78.2 MIXED HYPERLIPIDEMIA: ICD-10-CM

## 2018-03-23 DIAGNOSIS — I10 ESSENTIAL HYPERTENSION: ICD-10-CM

## 2018-03-26 RX ORDER — AMLODIPINE BESYLATE 5 MG/1
TABLET ORAL
Qty: 30 TABLET | Refills: 0 | Status: SHIPPED | OUTPATIENT
Start: 2018-03-26 | End: 2018-04-25 | Stop reason: SDUPTHER

## 2018-03-26 RX ORDER — PAROXETINE 10 MG/1
10 TABLET, FILM COATED ORAL EVERY MORNING
Qty: 30 TABLET | Refills: 0 | Status: SHIPPED | OUTPATIENT
Start: 2018-03-26 | End: 2018-04-25 | Stop reason: SDUPTHER

## 2018-03-26 RX ORDER — PRAVASTATIN SODIUM 20 MG
20 TABLET ORAL DAILY
Qty: 30 TABLET | Refills: 0 | Status: SHIPPED | OUTPATIENT
Start: 2018-03-26 | End: 2018-04-25 | Stop reason: SDUPTHER

## 2018-04-25 DIAGNOSIS — I10 ESSENTIAL HYPERTENSION: ICD-10-CM

## 2018-04-25 DIAGNOSIS — E78.2 MIXED HYPERLIPIDEMIA: ICD-10-CM

## 2018-04-25 DIAGNOSIS — F41.9 ANXIETY: ICD-10-CM

## 2018-04-25 RX ORDER — AMLODIPINE BESYLATE 5 MG/1
TABLET ORAL
Qty: 15 TABLET | Refills: 0 | Status: SHIPPED | OUTPATIENT
Start: 2018-04-25 | End: 2018-05-16 | Stop reason: SDUPTHER

## 2018-04-25 RX ORDER — PAROXETINE 10 MG/1
10 TABLET, FILM COATED ORAL EVERY MORNING
Qty: 15 TABLET | Refills: 0 | Status: SHIPPED | OUTPATIENT
Start: 2018-04-25 | End: 2018-05-16 | Stop reason: SDUPTHER

## 2018-04-25 RX ORDER — PRAVASTATIN SODIUM 20 MG
TABLET ORAL
Qty: 15 TABLET | Refills: 0 | Status: SHIPPED | OUTPATIENT
Start: 2018-04-25 | End: 2018-05-16 | Stop reason: SDUPTHER

## 2018-05-16 ENCOUNTER — OFFICE VISIT (OUTPATIENT)
Dept: FAMILY MEDICINE CLINIC | Facility: CLINIC | Age: 83
End: 2018-05-16

## 2018-05-16 VITALS
RESPIRATION RATE: 16 BRPM | SYSTOLIC BLOOD PRESSURE: 132 MMHG | WEIGHT: 134 LBS | HEART RATE: 71 BPM | TEMPERATURE: 97.8 F | DIASTOLIC BLOOD PRESSURE: 80 MMHG | BODY MASS INDEX: 23.74 KG/M2 | HEIGHT: 63 IN | OXYGEN SATURATION: 93 %

## 2018-05-16 DIAGNOSIS — F41.9 ANXIETY: ICD-10-CM

## 2018-05-16 DIAGNOSIS — C64.1 MALIGNANT NEOPLASM OF RIGHT KIDNEY (HCC): ICD-10-CM

## 2018-05-16 DIAGNOSIS — E21.0 PRIMARY HYPERPARATHYROIDISM (HCC): ICD-10-CM

## 2018-05-16 DIAGNOSIS — R53.82 CHRONIC FATIGUE: Primary | ICD-10-CM

## 2018-05-16 DIAGNOSIS — E83.52 CALCIUM BLOOD INCREASED: ICD-10-CM

## 2018-05-16 DIAGNOSIS — E78.2 MIXED HYPERLIPIDEMIA: ICD-10-CM

## 2018-05-16 DIAGNOSIS — E55.9 VITAMIN D DEFICIENCY: ICD-10-CM

## 2018-05-16 DIAGNOSIS — I10 ESSENTIAL HYPERTENSION: ICD-10-CM

## 2018-05-16 PROCEDURE — 99214 OFFICE O/P EST MOD 30 MIN: CPT | Performed by: FAMILY MEDICINE

## 2018-05-16 RX ORDER — PRAVASTATIN SODIUM 20 MG
20 TABLET ORAL DAILY
Qty: 30 TABLET | Refills: 5 | Status: SHIPPED | OUTPATIENT
Start: 2018-05-16 | End: 2018-09-18 | Stop reason: SDUPTHER

## 2018-05-16 RX ORDER — PAROXETINE 10 MG/1
10 TABLET, FILM COATED ORAL EVERY MORNING
Qty: 30 TABLET | Refills: 5 | Status: SHIPPED | OUTPATIENT
Start: 2018-05-16 | End: 2018-06-13 | Stop reason: SDUPTHER

## 2018-05-16 RX ORDER — AMLODIPINE BESYLATE 5 MG/1
5 TABLET ORAL DAILY
Qty: 30 TABLET | Refills: 5 | Status: SHIPPED | OUTPATIENT
Start: 2018-05-16 | End: 2018-06-13 | Stop reason: SDUPTHER

## 2018-05-16 NOTE — PROGRESS NOTES
"Subjective   Yolis Ospina is a 88 y.o. female.     History of Present Illness   Chief Complaint:   Chief Complaint   Patient presents with   • Hypertension   • Hyperlipidemia   • Hypothyroidism       Yolis Ospina 88 y.o. female who presents today for Medical Management of the below listed issues and medication refills.  she has a problem list of   Off synthroid for months,   Takes xanax prn  Has few left,   Not taking  Xanax daily or levothyroxine daily. Feels fatigue  wgt stable  Has had breast and kidney cancer,  Had a cough due to allergies.  Needs bra prosthesis and I will call jesus. She wears her breast prosthesis and surgical bras daily and this needs to be replaced because of wear; past useful lifetime. She needs a right prosthesis.   Patient Active Problem List   Diagnosis   • Fatigue   • Calcium blood increased   • Vitamin D deficiency   • Thyroid nodule   • Essential hypertension   • Hypothyroidism   • Hyperlipidemia   • Palpitation   • Anxiety   • Primary hyperparathyroidism   .  Since the last visit, she has overall felt well.  she has been compliant with   Current Outpatient Prescriptions:   •  ALPRAZolam (XANAX) 0.25 MG tablet, Take 0.25 mg by mouth at night as needed for anxiety., Disp: , Rfl:   •  amLODIPine (NORVASC) 5 MG tablet, TAKE 1 TABLET BY MOUTH DAILY. FOR BLOOD PRESSURE. (NEED TO MAKE APPOINTMENT), Disp: 15 tablet, Rfl: 0  •  levothyroxine (UNITHROID) 75 MCG tablet, Take 1 tablet by mouth Daily., Disp: 30 tablet, Rfl: 5  •  PARoxetine (PAXIL) 10 MG tablet, TAKE 1 TABLET BY MOUTH EVERY MORNING FOR STRESS., Disp: 15 tablet, Rfl: 0  •  pravastatin (PRAVACHOL) 20 MG tablet, TAKE 1 TABLET BY MOUTH DAILY. FOR CHOLESTEROL (NEED TO MAKE APPOINTMENT), Disp: 15 tablet, Rfl: 0.  she denies medication side effects.    All of the chronic condition(s) listed above are stable w/o issues.    /80   Pulse 71   Temp 97.8 °F (36.6 °C) (Oral)   Resp 16   Ht 158.8 cm (62.5\")   Wt 60.8 kg (134 lb)  "  SpO2 93%   BMI 24.12 kg/m²     The following portions of the patient's history were reviewed and updated as appropriate: allergies, current medications, past family history, past medical history, past social history, past surgical history and problem list.    Review of Systems   Constitutional: Positive for fatigue. Negative for activity change, appetite change and unexpected weight change.   HENT: Negative for congestion.    Eyes: Negative for visual disturbance.   Respiratory: Negative for chest tightness and shortness of breath.    Cardiovascular: Negative for chest pain and palpitations.   Gastrointestinal: Negative for abdominal pain and constipation.   Endocrine: Negative for cold intolerance, heat intolerance, polydipsia, polyphagia and polyuria.   Genitourinary: Negative for difficulty urinating, dysuria and menstrual problem.   Musculoskeletal: Negative for arthralgias.   Skin: Negative for rash.   Neurological: Negative for headaches.   Psychiatric/Behavioral: Negative for behavioral problems, dysphoric mood and sleep disturbance. The patient is not nervous/anxious.        Objective   Physical Exam   Constitutional: She is oriented to person, place, and time. She appears well-developed and well-nourished.   HENT:   Head: Normocephalic.   Right Ear: External ear normal.   Left Ear: External ear normal.   Mouth/Throat: Oropharynx is clear and moist.   Eyes: Pupils are equal, round, and reactive to light.   Neck: Normal range of motion. Neck supple. No thyromegaly present.   Cardiovascular: Normal rate, regular rhythm and normal heart sounds.    No murmur heard.  Pulmonary/Chest: Effort normal and breath sounds normal. No respiratory distress. She has no wheezes. She has no rales.   Abdominal: Soft. Bowel sounds are normal. She exhibits no mass.   Musculoskeletal: Normal range of motion. She exhibits no edema, tenderness or deformity.   Lymphadenopathy:     She has no cervical adenopathy.   Neurological:  She is alert and oriented to person, place, and time.   Skin: Skin is warm and dry. No rash noted.   Psychiatric: She has a normal mood and affect. Her behavior is normal.   Nursing note and vitals reviewed.      Assessment/Plan   Yolis was seen today for hypertension, hyperlipidemia and hypothyroidism.    Diagnoses and all orders for this visit:    Chronic fatigue  -     Thyroid Panel With TSH  -     Comprehensive Metabolic Panel  -     Lipid Panel  -     CBC & Differential  -     Vitamin D 25 Hydroxy  -     Urinalysis With Microscopic - Urine, Clean Catch  -     Ferritin  -     Iron Profile  -     Vitamin B12    Essential hypertension  -     amLODIPine (NORVASC) 5 MG tablet; Take 1 tablet by mouth Daily.  -     Thyroid Panel With TSH  -     Comprehensive Metabolic Panel  -     Lipid Panel  -     CBC & Differential  -     Vitamin D 25 Hydroxy  -     Urinalysis With Microscopic - Urine, Clean Catch  -     Ferritin  -     Iron Profile  -     Vitamin B12    Anxiety  -     PARoxetine (PAXIL) 10 MG tablet; Take 1 tablet by mouth Every Morning. For stress  -     Thyroid Panel With TSH  -     Comprehensive Metabolic Panel  -     Lipid Panel  -     CBC & Differential  -     Vitamin D 25 Hydroxy  -     Urinalysis With Microscopic - Urine, Clean Catch  -     Ferritin  -     Iron Profile  -     Vitamin B12    Mixed hyperlipidemia  -     pravastatin (PRAVACHOL) 20 MG tablet; Take 1 tablet by mouth Daily.  -     Thyroid Panel With TSH  -     Comprehensive Metabolic Panel  -     Lipid Panel  -     CBC & Differential  -     Vitamin D 25 Hydroxy  -     Urinalysis With Microscopic - Urine, Clean Catch  -     Ferritin  -     Iron Profile  -     Vitamin B12    Calcium blood increased  -     Thyroid Panel With TSH  -     Comprehensive Metabolic Panel  -     Lipid Panel  -     CBC & Differential  -     Vitamin D 25 Hydroxy  -     Urinalysis With Microscopic - Urine, Clean Catch  -     Ferritin  -     Iron Profile  -     Vitamin  B12    Vitamin D deficiency  -     Thyroid Panel With TSH  -     Comprehensive Metabolic Panel  -     Lipid Panel  -     CBC & Differential  -     Vitamin D 25 Hydroxy  -     Urinalysis With Microscopic - Urine, Clean Catch  -     Ferritin  -     Iron Profile  -     Vitamin B12    Primary hyperparathyroidism  -     Thyroid Panel With TSH  -     Comprehensive Metabolic Panel  -     Lipid Panel  -     CBC & Differential  -     Vitamin D 25 Hydroxy  -     Urinalysis With Microscopic - Urine, Clean Catch  -     Ferritin  -     Iron Profile  -     Vitamin B12    Malignant neoplasm of right kidney  -     Thyroid Panel With TSH  -     Comprehensive Metabolic Panel  -     Lipid Panel  -     CBC & Differential  -     Vitamin D 25 Hydroxy  -     Urinalysis With Microscopic - Urine, Clean Catch  -     Ferritin  -     Iron Profile  -     Vitamin B12

## 2018-05-16 NOTE — PATIENT INSTRUCTIONS
Exercise 30 minutes most days of the week  Sleep 6-8 hours each night if possible  Low fat, low cholesterol diet   we discussed prescribed medications and how to take them   make sure you get results of any labs/studies ordered today  Low glycemic index diet     Will recheck after labs

## 2018-05-17 LAB
25(OH)D3+25(OH)D2 SERPL-MCNC: 28 NG/ML (ref 30–100)
ALBUMIN SERPL-MCNC: 4.7 G/DL (ref 3.5–5.2)
ALBUMIN/GLOB SERPL: 1.5 G/DL
ALP SERPL-CCNC: 120 U/L (ref 39–117)
ALT SERPL-CCNC: 11 U/L (ref 1–33)
APPEARANCE UR: CLEAR
AST SERPL-CCNC: 19 U/L (ref 1–32)
BACTERIA #/AREA URNS HPF: NORMAL /HPF
BASOPHILS # BLD AUTO: 0.02 10*3/MM3 (ref 0–0.2)
BASOPHILS NFR BLD AUTO: 0.3 % (ref 0–1.5)
BILIRUB SERPL-MCNC: 0.7 MG/DL (ref 0.1–1.2)
BILIRUB UR QL STRIP: NEGATIVE
BUN SERPL-MCNC: 14 MG/DL (ref 8–23)
BUN/CREAT SERPL: 15.6 (ref 7–25)
CALCIUM SERPL-MCNC: 11 MG/DL (ref 8.6–10.5)
CASTS URNS MICRO: NORMAL
CHLORIDE SERPL-SCNC: 107 MMOL/L (ref 98–107)
CHOLEST SERPL-MCNC: 204 MG/DL (ref 0–200)
CO2 SERPL-SCNC: 22.8 MMOL/L (ref 22–29)
COLOR UR: YELLOW
CREAT SERPL-MCNC: 0.9 MG/DL (ref 0.57–1)
EOSINOPHIL # BLD AUTO: 0.13 10*3/MM3 (ref 0–0.7)
EOSINOPHIL NFR BLD AUTO: 2.1 % (ref 0.3–6.2)
EPI CELLS #/AREA URNS HPF: NORMAL /HPF
ERYTHROCYTE [DISTWIDTH] IN BLOOD BY AUTOMATED COUNT: 13.9 % (ref 11.7–13)
FERRITIN SERPL-MCNC: 95.02 NG/ML (ref 13–150)
FT4I SERPL CALC-MCNC: 1.3 (ref 1.2–4.9)
GFR SERPLBLD CREATININE-BSD FMLA CKD-EPI: 59 ML/MIN/1.73
GFR SERPLBLD CREATININE-BSD FMLA CKD-EPI: 72 ML/MIN/1.73
GLOBULIN SER CALC-MCNC: 3.1 GM/DL
GLUCOSE SERPL-MCNC: 88 MG/DL (ref 65–99)
GLUCOSE UR QL: NEGATIVE
HCT VFR BLD AUTO: 45.4 % (ref 35.6–45.5)
HDLC SERPL-MCNC: 79 MG/DL (ref 40–60)
HGB BLD-MCNC: 14.2 G/DL (ref 11.9–15.5)
HGB UR QL STRIP: NEGATIVE
IMM GRANULOCYTES # BLD: 0 10*3/MM3 (ref 0–0.03)
IMM GRANULOCYTES NFR BLD: 0 % (ref 0–0.5)
IRON SATN MFR SERPL: 21 % (ref 20–50)
IRON SERPL-MCNC: 98 MCG/DL (ref 37–145)
KETONES UR QL STRIP: NEGATIVE
LDLC SERPL CALC-MCNC: 97 MG/DL (ref 0–100)
LEUKOCYTE ESTERASE UR QL STRIP: (no result)
LYMPHOCYTES # BLD AUTO: 1.56 10*3/MM3 (ref 0.9–4.8)
LYMPHOCYTES NFR BLD AUTO: 25.4 % (ref 19.6–45.3)
MCH RBC QN AUTO: 29.8 PG (ref 26.9–32)
MCHC RBC AUTO-ENTMCNC: 31.3 G/DL (ref 32.4–36.3)
MCV RBC AUTO: 95.4 FL (ref 80.5–98.2)
MONOCYTES # BLD AUTO: 0.47 10*3/MM3 (ref 0.2–1.2)
MONOCYTES NFR BLD AUTO: 7.6 % (ref 5–12)
NEUTROPHILS # BLD AUTO: 3.97 10*3/MM3 (ref 1.9–8.1)
NEUTROPHILS NFR BLD AUTO: 64.6 % (ref 42.7–76)
NITRITE UR QL STRIP: NEGATIVE
PH UR STRIP: 6 [PH] (ref 5–8)
PLATELET # BLD AUTO: 251 10*3/MM3 (ref 140–500)
POTASSIUM SERPL-SCNC: 4.9 MMOL/L (ref 3.5–5.2)
PROT SERPL-MCNC: 7.8 G/DL (ref 6–8.5)
PROT UR QL STRIP: NEGATIVE
RBC # BLD AUTO: 4.76 10*6/MM3 (ref 3.9–5.2)
RBC #/AREA URNS HPF: NORMAL /HPF
SODIUM SERPL-SCNC: 145 MMOL/L (ref 136–145)
SP GR UR: 1.02 (ref 1–1.03)
T3RU NFR SERPL: 20 % (ref 24–39)
T4 SERPL-MCNC: 6.6 UG/DL (ref 4.5–12)
TIBC SERPL-MCNC: 459 MCG/DL
TRIGL SERPL-MCNC: 138 MG/DL (ref 0–150)
TSH SERPL DL<=0.005 MIU/L-ACNC: 3.89 UIU/ML (ref 0.45–4.5)
UIBC SERPL-MCNC: 361 MCG/DL
UROBILINOGEN UR STRIP-MCNC: (no result) MG/DL
VIT B12 SERPL-MCNC: 356 PG/ML (ref 211–946)
VLDLC SERPL CALC-MCNC: 27.6 MG/DL (ref 5–40)
WBC # BLD AUTO: 6.15 10*3/MM3 (ref 4.5–10.7)
WBC #/AREA URNS HPF: NORMAL /HPF

## 2018-05-31 ENCOUNTER — DOCUMENTATION (OUTPATIENT)
Dept: FAMILY MEDICINE CLINIC | Facility: CLINIC | Age: 83
End: 2018-05-31

## 2018-06-05 DIAGNOSIS — E83.52 CALCIUM BLOOD INCREASED: ICD-10-CM

## 2018-06-05 DIAGNOSIS — E03.8 HYPOTHYROIDISM DUE TO HASHIMOTO'S THYROIDITIS: Primary | ICD-10-CM

## 2018-06-05 DIAGNOSIS — E21.0 PRIMARY HYPERPARATHYROIDISM (HCC): ICD-10-CM

## 2018-06-05 DIAGNOSIS — E06.3 HYPOTHYROIDISM DUE TO HASHIMOTO'S THYROIDITIS: Primary | ICD-10-CM

## 2018-06-13 DIAGNOSIS — F41.9 ANXIETY: ICD-10-CM

## 2018-06-13 DIAGNOSIS — I10 ESSENTIAL HYPERTENSION: ICD-10-CM

## 2018-06-14 RX ORDER — PAROXETINE 10 MG/1
10 TABLET, FILM COATED ORAL EVERY MORNING
Qty: 30 TABLET | Refills: 5 | Status: SHIPPED | OUTPATIENT
Start: 2018-06-14 | End: 2019-03-30 | Stop reason: SDUPTHER

## 2018-06-14 RX ORDER — AMLODIPINE BESYLATE 5 MG/1
5 TABLET ORAL DAILY
Qty: 30 TABLET | Refills: 5 | Status: ON HOLD | OUTPATIENT
Start: 2018-06-14 | End: 2019-01-03 | Stop reason: SDUPTHER

## 2018-09-01 ENCOUNTER — RESULTS ENCOUNTER (OUTPATIENT)
Dept: FAMILY MEDICINE CLINIC | Facility: CLINIC | Age: 83
End: 2018-09-01

## 2018-09-01 DIAGNOSIS — E21.0 PRIMARY HYPERPARATHYROIDISM (HCC): ICD-10-CM

## 2018-09-01 DIAGNOSIS — E06.3 HYPOTHYROIDISM DUE TO HASHIMOTO'S THYROIDITIS: ICD-10-CM

## 2018-09-01 DIAGNOSIS — E03.8 HYPOTHYROIDISM DUE TO HASHIMOTO'S THYROIDITIS: ICD-10-CM

## 2018-09-01 DIAGNOSIS — E83.52 CALCIUM BLOOD INCREASED: ICD-10-CM

## 2018-09-18 DIAGNOSIS — E78.2 MIXED HYPERLIPIDEMIA: ICD-10-CM

## 2018-09-20 RX ORDER — PRAVASTATIN SODIUM 20 MG
20 TABLET ORAL DAILY
Qty: 30 TABLET | Refills: 1 | Status: SHIPPED | OUTPATIENT
Start: 2018-09-20 | End: 2018-11-20 | Stop reason: SDUPTHER

## 2018-11-17 DIAGNOSIS — E78.2 MIXED HYPERLIPIDEMIA: ICD-10-CM

## 2018-11-19 RX ORDER — PRAVASTATIN SODIUM 20 MG
20 TABLET ORAL DAILY
Qty: 30 TABLET | Refills: 1 | OUTPATIENT
Start: 2018-11-19

## 2018-11-20 DIAGNOSIS — E78.2 MIXED HYPERLIPIDEMIA: ICD-10-CM

## 2018-11-20 RX ORDER — PRAVASTATIN SODIUM 20 MG
20 TABLET ORAL DAILY
Qty: 30 TABLET | Refills: 0 | Status: ON HOLD | OUTPATIENT
Start: 2018-11-20 | End: 2019-01-02 | Stop reason: SDUPTHER

## 2018-12-27 ENCOUNTER — HOSPITAL ENCOUNTER (INPATIENT)
Facility: HOSPITAL | Age: 83
LOS: 5 days | Discharge: HOME | End: 2019-01-03
Attending: EMERGENCY MEDICINE | Admitting: INTERNAL MEDICINE

## 2018-12-27 ENCOUNTER — APPOINTMENT (OUTPATIENT)
Dept: GENERAL RADIOLOGY | Facility: HOSPITAL | Age: 83
End: 2018-12-27

## 2018-12-27 ENCOUNTER — APPOINTMENT (OUTPATIENT)
Dept: CT IMAGING | Facility: HOSPITAL | Age: 83
End: 2018-12-27

## 2018-12-27 DIAGNOSIS — E78.2 MIXED HYPERLIPIDEMIA: ICD-10-CM

## 2018-12-27 DIAGNOSIS — R47.1 DYSARTHRIA: Primary | ICD-10-CM

## 2018-12-27 DIAGNOSIS — I10 ESSENTIAL HYPERTENSION: ICD-10-CM

## 2018-12-27 LAB
ABO GROUP BLD: NORMAL
ALBUMIN SERPL-MCNC: 3.9 G/DL (ref 3.5–5.2)
ALBUMIN/GLOB SERPL: 1 G/DL
ALP SERPL-CCNC: 108 U/L (ref 39–117)
ALT SERPL W P-5'-P-CCNC: 18 U/L (ref 1–33)
ANION GAP SERPL CALCULATED.3IONS-SCNC: 10.1 MMOL/L
APTT PPP: 26.4 SECONDS (ref 22.7–35.4)
AST SERPL-CCNC: 18 U/L (ref 1–32)
BASOPHILS # BLD AUTO: 0.01 10*3/MM3 (ref 0–0.2)
BASOPHILS NFR BLD AUTO: 0.1 % (ref 0–1.5)
BILIRUB SERPL-MCNC: 0.4 MG/DL (ref 0.1–1.2)
BILIRUB UR QL STRIP: NEGATIVE
BLD GP AB SCN SERPL QL: NEGATIVE
BUN BLD-MCNC: 15 MG/DL (ref 8–23)
BUN/CREAT SERPL: 17 (ref 7–25)
CALCIUM SPEC-SCNC: 10.8 MG/DL (ref 8.6–10.5)
CHLORIDE SERPL-SCNC: 109 MMOL/L (ref 98–107)
CLARITY UR: CLEAR
CO2 SERPL-SCNC: 20.9 MMOL/L (ref 22–29)
COLOR UR: YELLOW
CREAT BLD-MCNC: 0.88 MG/DL (ref 0.57–1)
DEPRECATED RDW RBC AUTO: 45.1 FL (ref 37–54)
EOSINOPHIL # BLD AUTO: 0.17 10*3/MM3 (ref 0–0.7)
EOSINOPHIL NFR BLD AUTO: 2.5 % (ref 0.3–6.2)
ERYTHROCYTE [DISTWIDTH] IN BLOOD BY AUTOMATED COUNT: 13.8 % (ref 11.7–13)
GFR SERPL CREATININE-BSD FRML MDRD: 61 ML/MIN/1.73
GLOBULIN UR ELPH-MCNC: 4.1 GM/DL
GLUCOSE BLD-MCNC: 90 MG/DL (ref 65–99)
GLUCOSE BLDC GLUCOMTR-MCNC: 98 MG/DL (ref 70–130)
GLUCOSE UR STRIP-MCNC: NEGATIVE MG/DL
HCT VFR BLD AUTO: 41.2 % (ref 35.6–45.5)
HGB BLD-MCNC: 13.6 G/DL (ref 11.9–15.5)
HGB UR QL STRIP.AUTO: NEGATIVE
HOLD SPECIMEN: NORMAL
HOLD SPECIMEN: NORMAL
IMM GRANULOCYTES # BLD AUTO: 0.02 10*3/MM3 (ref 0–0.03)
IMM GRANULOCYTES NFR BLD AUTO: 0.3 % (ref 0–0.5)
INR PPP: 1.05 (ref 0.9–1.1)
KETONES UR QL STRIP: NEGATIVE
LEUKOCYTE ESTERASE UR QL STRIP.AUTO: NEGATIVE
LYMPHOCYTES # BLD AUTO: 1.49 10*3/MM3 (ref 0.9–4.8)
LYMPHOCYTES NFR BLD AUTO: 22.2 % (ref 19.6–45.3)
MCH RBC QN AUTO: 29.6 PG (ref 26.9–32)
MCHC RBC AUTO-ENTMCNC: 33 G/DL (ref 32.4–36.3)
MCV RBC AUTO: 89.6 FL (ref 80.5–98.2)
MONOCYTES # BLD AUTO: 0.52 10*3/MM3 (ref 0.2–1.2)
MONOCYTES NFR BLD AUTO: 7.7 % (ref 5–12)
NEUTROPHILS # BLD AUTO: 4.52 10*3/MM3 (ref 1.9–8.1)
NEUTROPHILS NFR BLD AUTO: 67.5 % (ref 42.7–76)
NITRITE UR QL STRIP: NEGATIVE
PH UR STRIP.AUTO: 6.5 [PH] (ref 5–8)
PLATELET # BLD AUTO: 292 10*3/MM3 (ref 140–500)
PMV BLD AUTO: 10.3 FL (ref 6–12)
POTASSIUM BLD-SCNC: 4.1 MMOL/L (ref 3.5–5.2)
PROT SERPL-MCNC: 8 G/DL (ref 6–8.5)
PROT UR QL STRIP: NEGATIVE
PROTHROMBIN TIME: 13.5 SECONDS (ref 11.7–14.2)
RBC # BLD AUTO: 4.6 10*6/MM3 (ref 3.9–5.2)
RH BLD: POSITIVE
SODIUM BLD-SCNC: 140 MMOL/L (ref 136–145)
SP GR UR STRIP: 1.01 (ref 1–1.03)
T&S EXPIRATION DATE: NORMAL
TROPONIN T SERPL-MCNC: <0.01 NG/ML (ref 0–0.03)
UROBILINOGEN UR QL STRIP: NORMAL
WBC NRBC COR # BLD: 6.71 10*3/MM3 (ref 4.5–10.7)
WHOLE BLOOD HOLD SPECIMEN: NORMAL
WHOLE BLOOD HOLD SPECIMEN: NORMAL

## 2018-12-27 PROCEDURE — G0378 HOSPITAL OBSERVATION PER HR: HCPCS

## 2018-12-27 PROCEDURE — 93005 ELECTROCARDIOGRAM TRACING: CPT | Performed by: EMERGENCY MEDICINE

## 2018-12-27 PROCEDURE — 85610 PROTHROMBIN TIME: CPT | Performed by: EMERGENCY MEDICINE

## 2018-12-27 PROCEDURE — 82962 GLUCOSE BLOOD TEST: CPT

## 2018-12-27 PROCEDURE — 80053 COMPREHEN METABOLIC PANEL: CPT | Performed by: EMERGENCY MEDICINE

## 2018-12-27 PROCEDURE — 71045 X-RAY EXAM CHEST 1 VIEW: CPT

## 2018-12-27 PROCEDURE — 81003 URINALYSIS AUTO W/O SCOPE: CPT | Performed by: EMERGENCY MEDICINE

## 2018-12-27 PROCEDURE — 99285 EMERGENCY DEPT VISIT HI MDM: CPT

## 2018-12-27 PROCEDURE — 85025 COMPLETE CBC W/AUTO DIFF WBC: CPT | Performed by: EMERGENCY MEDICINE

## 2018-12-27 PROCEDURE — 85730 THROMBOPLASTIN TIME PARTIAL: CPT | Performed by: EMERGENCY MEDICINE

## 2018-12-27 PROCEDURE — 93010 ELECTROCARDIOGRAM REPORT: CPT | Performed by: INTERNAL MEDICINE

## 2018-12-27 PROCEDURE — 86850 RBC ANTIBODY SCREEN: CPT | Performed by: EMERGENCY MEDICINE

## 2018-12-27 PROCEDURE — 70450 CT HEAD/BRAIN W/O DYE: CPT

## 2018-12-27 PROCEDURE — 86900 BLOOD TYPING SEROLOGIC ABO: CPT | Performed by: EMERGENCY MEDICINE

## 2018-12-27 PROCEDURE — 86901 BLOOD TYPING SEROLOGIC RH(D): CPT | Performed by: EMERGENCY MEDICINE

## 2018-12-27 PROCEDURE — 84484 ASSAY OF TROPONIN QUANT: CPT | Performed by: EMERGENCY MEDICINE

## 2018-12-27 RX ORDER — SODIUM CHLORIDE 9 MG/ML
75 INJECTION, SOLUTION INTRAVENOUS CONTINUOUS
Status: DISCONTINUED | OUTPATIENT
Start: 2018-12-28 | End: 2019-01-03

## 2018-12-27 RX ORDER — SODIUM CHLORIDE 0.9 % (FLUSH) 0.9 %
3-10 SYRINGE (ML) INJECTION AS NEEDED
Status: DISCONTINUED | OUTPATIENT
Start: 2018-12-27 | End: 2019-01-03 | Stop reason: HOSPADM

## 2018-12-27 RX ORDER — ASPIRIN 300 MG/1
300 SUPPOSITORY RECTAL DAILY
Status: DISCONTINUED | OUTPATIENT
Start: 2018-12-28 | End: 2018-12-30

## 2018-12-27 RX ORDER — ATORVASTATIN CALCIUM 80 MG/1
80 TABLET, FILM COATED ORAL NIGHTLY
Status: DISCONTINUED | OUTPATIENT
Start: 2018-12-28 | End: 2019-01-03 | Stop reason: HOSPADM

## 2018-12-27 RX ORDER — LEVOTHYROXINE SODIUM 0.07 MG/1
75 TABLET ORAL
Status: DISCONTINUED | OUTPATIENT
Start: 2018-12-28 | End: 2019-01-03 | Stop reason: HOSPADM

## 2018-12-27 RX ORDER — ASPIRIN 325 MG
325 TABLET ORAL DAILY
Status: DISCONTINUED | OUTPATIENT
Start: 2018-12-28 | End: 2018-12-30

## 2018-12-27 RX ORDER — SODIUM CHLORIDE 0.9 % (FLUSH) 0.9 %
10 SYRINGE (ML) INJECTION AS NEEDED
Status: DISCONTINUED | OUTPATIENT
Start: 2018-12-27 | End: 2019-01-03 | Stop reason: HOSPADM

## 2018-12-27 RX ORDER — SODIUM CHLORIDE 0.9 % (FLUSH) 0.9 %
3 SYRINGE (ML) INJECTION EVERY 12 HOURS SCHEDULED
Status: DISCONTINUED | OUTPATIENT
Start: 2018-12-28 | End: 2019-01-03 | Stop reason: HOSPADM

## 2018-12-27 RX ORDER — PAROXETINE 10 MG/1
10 TABLET, FILM COATED ORAL EVERY MORNING
Status: DISCONTINUED | OUTPATIENT
Start: 2018-12-28 | End: 2019-01-03 | Stop reason: HOSPADM

## 2018-12-27 RX ADMIN — SODIUM CHLORIDE 75 ML/HR: 9 INJECTION, SOLUTION INTRAVENOUS at 23:51

## 2018-12-28 ENCOUNTER — APPOINTMENT (OUTPATIENT)
Dept: MRI IMAGING | Facility: HOSPITAL | Age: 83
End: 2018-12-28

## 2018-12-28 ENCOUNTER — APPOINTMENT (OUTPATIENT)
Dept: CARDIOLOGY | Facility: HOSPITAL | Age: 83
End: 2018-12-28
Attending: INTERNAL MEDICINE

## 2018-12-28 LAB
ANION GAP SERPL CALCULATED.3IONS-SCNC: 8.4 MMOL/L
BASOPHILS # BLD AUTO: 0.01 10*3/MM3 (ref 0–0.2)
BASOPHILS NFR BLD AUTO: 0.2 % (ref 0–1.5)
BH CV ECHO MEAS - ACS: 1.8 CM
BH CV ECHO MEAS - AI DEC SLOPE: 71.7 CM/SEC^2
BH CV ECHO MEAS - AI MAX PG: 18.5 MMHG
BH CV ECHO MEAS - AI MAX VEL: 215 CM/SEC
BH CV ECHO MEAS - AI P1/2T: 878.3 MSEC
BH CV ECHO MEAS - AO MEAN PG (FULL): -1 MMHG
BH CV ECHO MEAS - AO MEAN PG: 3 MMHG
BH CV ECHO MEAS - AO ROOT AREA (BSA CORRECTED): 2.1
BH CV ECHO MEAS - AO ROOT AREA: 8.6 CM^2
BH CV ECHO MEAS - AO ROOT DIAM: 3.3 CM
BH CV ECHO MEAS - AO V2 MAX: 129 CM/SEC
BH CV ECHO MEAS - AO V2 MEAN: 85.6 CM/SEC
BH CV ECHO MEAS - AO V2 VTI: 29.1 CM
BH CV ECHO MEAS - AVA(I,A): 2.8 CM^2
BH CV ECHO MEAS - AVA(I,D): 2.8 CM^2
BH CV ECHO MEAS - BSA(HAYCOCK): 1.6 M^2
BH CV ECHO MEAS - BSA: 1.6 M^2
BH CV ECHO MEAS - BZI_BMI: 24.3 KILOGRAMS/M^2
BH CV ECHO MEAS - BZI_METRIC_HEIGHT: 157.5 CM
BH CV ECHO MEAS - BZI_METRIC_WEIGHT: 60.3 KG
BH CV ECHO MEAS - EDV(CUBED): 46.7 ML
BH CV ECHO MEAS - EDV(MOD-SP2): 43 ML
BH CV ECHO MEAS - EDV(MOD-SP4): 71 ML
BH CV ECHO MEAS - EDV(TEICH): 54.4 ML
BH CV ECHO MEAS - EF(CUBED): 66.5 %
BH CV ECHO MEAS - EF(MOD-BP): 64 %
BH CV ECHO MEAS - EF(MOD-SP2): 60.5 %
BH CV ECHO MEAS - EF(MOD-SP4): 67.6 %
BH CV ECHO MEAS - EF(TEICH): 59 %
BH CV ECHO MEAS - ESV(CUBED): 15.6 ML
BH CV ECHO MEAS - ESV(MOD-SP2): 17 ML
BH CV ECHO MEAS - ESV(MOD-SP4): 23 ML
BH CV ECHO MEAS - ESV(TEICH): 22.3 ML
BH CV ECHO MEAS - FS: 30.6 %
BH CV ECHO MEAS - IVS/LVPW: 1.2
BH CV ECHO MEAS - IVSD: 1.1 CM
BH CV ECHO MEAS - LAT PEAK E' VEL: 10 CM/SEC
BH CV ECHO MEAS - LV DIASTOLIC VOL/BSA (35-75): 44.2 ML/M^2
BH CV ECHO MEAS - LV MASS(C)D: 107.9 GRAMS
BH CV ECHO MEAS - LV MASS(C)DI: 67.1 GRAMS/M^2
BH CV ECHO MEAS - LV MEAN PG: 4 MMHG
BH CV ECHO MEAS - LV SYSTOLIC VOL/BSA (12-30): 14.3 ML/M^2
BH CV ECHO MEAS - LV V1 MAX: 124 CM/SEC
BH CV ECHO MEAS - LV V1 MEAN: 87.8 CM/SEC
BH CV ECHO MEAS - LV V1 VTI: 28.7 CM
BH CV ECHO MEAS - LVIDD: 3.6 CM
BH CV ECHO MEAS - LVIDS: 2.5 CM
BH CV ECHO MEAS - LVLD AP2: 7 CM
BH CV ECHO MEAS - LVLD AP4: 7.6 CM
BH CV ECHO MEAS - LVLS AP2: 5.8 CM
BH CV ECHO MEAS - LVLS AP4: 6.4 CM
BH CV ECHO MEAS - LVOT AREA (M): 2.8 CM^2
BH CV ECHO MEAS - LVOT AREA: 2.8 CM^2
BH CV ECHO MEAS - LVOT DIAM: 1.9 CM
BH CV ECHO MEAS - LVPWD: 0.9 CM
BH CV ECHO MEAS - MED PEAK E' VEL: 5 CM/SEC
BH CV ECHO MEAS - MV A DUR: 0.15 SEC
BH CV ECHO MEAS - MV A MAX VEL: 99.2 CM/SEC
BH CV ECHO MEAS - MV DEC SLOPE: 212 CM/SEC^2
BH CV ECHO MEAS - MV DEC TIME: 0.29 SEC
BH CV ECHO MEAS - MV E MAX VEL: 59.7 CM/SEC
BH CV ECHO MEAS - MV E/A: 0.6
BH CV ECHO MEAS - MV MEAN PG: 1 MMHG
BH CV ECHO MEAS - MV P1/2T MAX VEL: 65.2 CM/SEC
BH CV ECHO MEAS - MV P1/2T: 90.1 MSEC
BH CV ECHO MEAS - MV V2 MEAN: 46.6 CM/SEC
BH CV ECHO MEAS - MV V2 VTI: 29.8 CM
BH CV ECHO MEAS - MVA P1/2T LCG: 3.4 CM^2
BH CV ECHO MEAS - MVA(P1/2T): 2.4 CM^2
BH CV ECHO MEAS - MVA(VTI): 2.7 CM^2
BH CV ECHO MEAS - PA ACC SLOPE: 4.8 CM/SEC^2
BH CV ECHO MEAS - PA ACC TIME: 0.14 SEC
BH CV ECHO MEAS - PA MAX PG (FULL): 1.4 MMHG
BH CV ECHO MEAS - PA MAX PG: 4.2 MMHG
BH CV ECHO MEAS - PA PR(ACCEL): 15.6 MMHG
BH CV ECHO MEAS - PA V2 MAX: 103 CM/SEC
BH CV ECHO MEAS - PULM A REVS DUR: 0.12 SEC
BH CV ECHO MEAS - PULM A REVS VEL: 30.3 CM/SEC
BH CV ECHO MEAS - PULM DIAS VEL: 23.4 CM/SEC
BH CV ECHO MEAS - PULM S/D: 1.4
BH CV ECHO MEAS - PULM SYS VEL: 33.8 CM/SEC
BH CV ECHO MEAS - RV MAX PG: 2.8 MMHG
BH CV ECHO MEAS - RV MEAN PG: 2 MMHG
BH CV ECHO MEAS - RV V1 MAX: 84.4 CM/SEC
BH CV ECHO MEAS - RV V1 MEAN: 61.2 CM/SEC
BH CV ECHO MEAS - RV V1 VTI: 17.4 CM
BH CV ECHO MEAS - SI(AO): 154.9 ML/M^2
BH CV ECHO MEAS - SI(CUBED): 19.3 ML/M^2
BH CV ECHO MEAS - SI(LVOT): 50.6 ML/M^2
BH CV ECHO MEAS - SI(MOD-SP2): 16.2 ML/M^2
BH CV ECHO MEAS - SI(MOD-SP4): 29.9 ML/M^2
BH CV ECHO MEAS - SI(TEICH): 20 ML/M^2
BH CV ECHO MEAS - SV(AO): 248.9 ML
BH CV ECHO MEAS - SV(CUBED): 31 ML
BH CV ECHO MEAS - SV(LVOT): 81.4 ML
BH CV ECHO MEAS - SV(MOD-SP2): 26 ML
BH CV ECHO MEAS - SV(MOD-SP4): 48 ML
BH CV ECHO MEAS - SV(TEICH): 32.1 ML
BH CV ECHO MEAS - TAPSE (>1.6): 1.9 CM2
BH CV ECHO MEASUREMENTS AVERAGE E/E' RATIO: 7.96
BH CV VAS BP RIGHT ARM: NORMAL MMHG
BH CV XLRA - RV BASE: 3.1 CM
BH CV XLRA - TDI S': 12 CM/SEC
BUN BLD-MCNC: 11 MG/DL (ref 8–23)
BUN/CREAT SERPL: 15.5 (ref 7–25)
CALCIUM SPEC-SCNC: 9.9 MG/DL (ref 8.6–10.5)
CHLORIDE SERPL-SCNC: 116 MMOL/L (ref 98–107)
CHOLEST SERPL-MCNC: 161 MG/DL (ref 0–200)
CO2 SERPL-SCNC: 19.6 MMOL/L (ref 22–29)
CREAT BLD-MCNC: 0.71 MG/DL (ref 0.57–1)
DEPRECATED RDW RBC AUTO: 45.1 FL (ref 37–54)
EOSINOPHIL # BLD AUTO: 0.2 10*3/MM3 (ref 0–0.7)
EOSINOPHIL NFR BLD AUTO: 4 % (ref 0.3–6.2)
ERYTHROCYTE [DISTWIDTH] IN BLOOD BY AUTOMATED COUNT: 13.7 % (ref 11.7–13)
GFR SERPL CREATININE-BSD FRML MDRD: 78 ML/MIN/1.73
GLUCOSE BLD-MCNC: 93 MG/DL (ref 65–99)
HBA1C MFR BLD: 5.3 % (ref 4.8–5.6)
HCT VFR BLD AUTO: 36.4 % (ref 35.6–45.5)
HDLC SERPL-MCNC: 57 MG/DL (ref 40–60)
HGB BLD-MCNC: 12 G/DL (ref 11.9–15.5)
IMM GRANULOCYTES # BLD AUTO: 0.01 10*3/MM3 (ref 0–0.03)
IMM GRANULOCYTES NFR BLD AUTO: 0.2 % (ref 0–0.5)
LDLC SERPL CALC-MCNC: 80 MG/DL (ref 0–100)
LDLC/HDLC SERPL: 1.41 {RATIO}
LEFT ATRIUM VOLUME INDEX: 21 ML/M2
LV EF 2D ECHO EST: 64 %
LYMPHOCYTES # BLD AUTO: 1.55 10*3/MM3 (ref 0.9–4.8)
LYMPHOCYTES NFR BLD AUTO: 31.3 % (ref 19.6–45.3)
MAXIMAL PREDICTED HEART RATE: 131 BPM
MCH RBC QN AUTO: 29.6 PG (ref 26.9–32)
MCHC RBC AUTO-ENTMCNC: 33 G/DL (ref 32.4–36.3)
MCV RBC AUTO: 89.7 FL (ref 80.5–98.2)
MONOCYTES # BLD AUTO: 0.62 10*3/MM3 (ref 0.2–1.2)
MONOCYTES NFR BLD AUTO: 12.5 % (ref 5–12)
NEUTROPHILS # BLD AUTO: 2.57 10*3/MM3 (ref 1.9–8.1)
NEUTROPHILS NFR BLD AUTO: 52 % (ref 42.7–76)
PLATELET # BLD AUTO: 268 10*3/MM3 (ref 140–500)
PMV BLD AUTO: 10 FL (ref 6–12)
POTASSIUM BLD-SCNC: 3.9 MMOL/L (ref 3.5–5.2)
RBC # BLD AUTO: 4.06 10*6/MM3 (ref 3.9–5.2)
SODIUM BLD-SCNC: 144 MMOL/L (ref 136–145)
STRESS TARGET HR: 111 BPM
TRIGL SERPL-MCNC: 118 MG/DL (ref 0–150)
TSH SERPL DL<=0.05 MIU/L-ACNC: 3.69 MIU/ML (ref 0.27–4.2)
VIT B12 BLD-MCNC: 507 PG/ML (ref 211–946)
VLDLC SERPL-MCNC: 23.6 MG/DL (ref 5–40)
WBC NRBC COR # BLD: 4.95 10*3/MM3 (ref 4.5–10.7)

## 2018-12-28 PROCEDURE — G8989 SELF CARE D/C STATUS: HCPCS

## 2018-12-28 PROCEDURE — G0378 HOSPITAL OBSERVATION PER HR: HCPCS

## 2018-12-28 PROCEDURE — 97162 PT EVAL MOD COMPLEX 30 MIN: CPT

## 2018-12-28 PROCEDURE — 92610 EVALUATE SWALLOWING FUNCTION: CPT | Performed by: SPEECH-LANGUAGE PATHOLOGIST

## 2018-12-28 PROCEDURE — 70544 MR ANGIOGRAPHY HEAD W/O DYE: CPT

## 2018-12-28 PROCEDURE — G8979 MOBILITY GOAL STATUS: HCPCS

## 2018-12-28 PROCEDURE — 80061 LIPID PANEL: CPT | Performed by: INTERNAL MEDICINE

## 2018-12-28 PROCEDURE — 70553 MRI BRAIN STEM W/O & W/DYE: CPT

## 2018-12-28 PROCEDURE — 85025 COMPLETE CBC W/AUTO DIFF WBC: CPT | Performed by: INTERNAL MEDICINE

## 2018-12-28 PROCEDURE — 97165 OT EVAL LOW COMPLEX 30 MIN: CPT

## 2018-12-28 PROCEDURE — G8988 SELF CARE GOAL STATUS: HCPCS

## 2018-12-28 PROCEDURE — 82607 VITAMIN B-12: CPT | Performed by: INTERNAL MEDICINE

## 2018-12-28 PROCEDURE — 83036 HEMOGLOBIN GLYCOSYLATED A1C: CPT | Performed by: INTERNAL MEDICINE

## 2018-12-28 PROCEDURE — 84443 ASSAY THYROID STIM HORMONE: CPT | Performed by: INTERNAL MEDICINE

## 2018-12-28 PROCEDURE — 99205 OFFICE O/P NEW HI 60 MIN: CPT | Performed by: PSYCHIATRY & NEUROLOGY

## 2018-12-28 PROCEDURE — 0 GADOBENATE DIMEGLUMINE 529 MG/ML SOLUTION: Performed by: INTERNAL MEDICINE

## 2018-12-28 PROCEDURE — G8987 SELF CARE CURRENT STATUS: HCPCS

## 2018-12-28 PROCEDURE — A9577 INJ MULTIHANCE: HCPCS | Performed by: INTERNAL MEDICINE

## 2018-12-28 PROCEDURE — G8996 SWALLOW CURRENT STATUS: HCPCS | Performed by: SPEECH-LANGUAGE PATHOLOGIST

## 2018-12-28 PROCEDURE — G8997 SWALLOW GOAL STATUS: HCPCS | Performed by: SPEECH-LANGUAGE PATHOLOGIST

## 2018-12-28 PROCEDURE — 36415 COLL VENOUS BLD VENIPUNCTURE: CPT | Performed by: INTERNAL MEDICINE

## 2018-12-28 PROCEDURE — G8980 MOBILITY D/C STATUS: HCPCS

## 2018-12-28 PROCEDURE — G8978 MOBILITY CURRENT STATUS: HCPCS

## 2018-12-28 PROCEDURE — 93306 TTE W/DOPPLER COMPLETE: CPT

## 2018-12-28 PROCEDURE — 80048 BASIC METABOLIC PNL TOTAL CA: CPT | Performed by: INTERNAL MEDICINE

## 2018-12-28 PROCEDURE — G8998 SWALLOW D/C STATUS: HCPCS | Performed by: SPEECH-LANGUAGE PATHOLOGIST

## 2018-12-28 PROCEDURE — 93306 TTE W/DOPPLER COMPLETE: CPT | Performed by: INTERNAL MEDICINE

## 2018-12-28 PROCEDURE — 70549 MR ANGIOGRAPH NECK W/O&W/DYE: CPT

## 2018-12-28 RX ORDER — LORAZEPAM 1 MG/1
1 TABLET ORAL ONCE AS NEEDED
Status: DISCONTINUED | OUTPATIENT
Start: 2018-12-28 | End: 2018-12-28

## 2018-12-28 RX ORDER — LORAZEPAM 1 MG/1
1 TABLET ORAL
Status: COMPLETED | OUTPATIENT
Start: 2018-12-28 | End: 2018-12-28

## 2018-12-28 RX ADMIN — GADOBENATE DIMEGLUMINE 12 ML: 529 INJECTION, SOLUTION INTRAVENOUS at 20:33

## 2018-12-28 RX ADMIN — ASPIRIN 325 MG: 325 TABLET ORAL at 09:49

## 2018-12-28 RX ADMIN — SODIUM CHLORIDE, PRESERVATIVE FREE 3 ML: 5 INJECTION INTRAVENOUS at 21:00

## 2018-12-28 RX ADMIN — PAROXETINE HYDROCHLORIDE 10 MG: 10 TABLET, FILM COATED ORAL at 09:49

## 2018-12-28 RX ADMIN — SODIUM CHLORIDE, PRESERVATIVE FREE 3 ML: 5 INJECTION INTRAVENOUS at 00:30

## 2018-12-28 RX ADMIN — LORAZEPAM 1 MG: 1 TABLET ORAL at 18:47

## 2018-12-28 RX ADMIN — ATORVASTATIN CALCIUM 80 MG: 80 TABLET, FILM COATED ORAL at 21:15

## 2018-12-28 RX ADMIN — SODIUM CHLORIDE, PRESERVATIVE FREE 3 ML: 5 INJECTION INTRAVENOUS at 09:49

## 2018-12-28 RX ADMIN — SODIUM CHLORIDE 75 ML/HR: 9 INJECTION, SOLUTION INTRAVENOUS at 21:23

## 2018-12-29 PROBLEM — I63.312 CEREBROVASCULAR ACCIDENT (CVA) DUE TO THROMBOSIS OF LEFT MIDDLE CEREBRAL ARTERY: Status: ACTIVE | Noted: 2018-12-29

## 2018-12-29 LAB
ANION GAP SERPL CALCULATED.3IONS-SCNC: 8.8 MMOL/L
APTT PPP: 25.7 SECONDS (ref 22.7–35.4)
APTT PPP: 41.4 SECONDS (ref 22.7–35.4)
BASOPHILS # BLD AUTO: 0.02 10*3/MM3 (ref 0–0.2)
BASOPHILS NFR BLD AUTO: 0.3 % (ref 0–1.5)
BUN BLD-MCNC: 11 MG/DL (ref 8–23)
BUN/CREAT SERPL: 13.1 (ref 7–25)
CALCIUM SPEC-SCNC: 10.4 MG/DL (ref 8.6–10.5)
CHLORIDE SERPL-SCNC: 116 MMOL/L (ref 98–107)
CO2 SERPL-SCNC: 18.2 MMOL/L (ref 22–29)
CREAT BLD-MCNC: 0.84 MG/DL (ref 0.57–1)
DEPRECATED RDW RBC AUTO: 46.3 FL (ref 37–54)
DEPRECATED RDW RBC AUTO: 46.8 FL (ref 37–54)
EOSINOPHIL # BLD AUTO: 0.22 10*3/MM3 (ref 0–0.7)
EOSINOPHIL NFR BLD AUTO: 3.4 % (ref 0.3–6.2)
ERYTHROCYTE [DISTWIDTH] IN BLOOD BY AUTOMATED COUNT: 13.9 % (ref 11.7–13)
ERYTHROCYTE [DISTWIDTH] IN BLOOD BY AUTOMATED COUNT: 14 % (ref 11.7–13)
GFR SERPL CREATININE-BSD FRML MDRD: 64 ML/MIN/1.73
GLUCOSE BLD-MCNC: 97 MG/DL (ref 65–99)
GLUCOSE BLDC GLUCOMTR-MCNC: 127 MG/DL (ref 70–130)
HCT VFR BLD AUTO: 41.6 % (ref 35.6–45.5)
HCT VFR BLD AUTO: 41.7 % (ref 35.6–45.5)
HGB BLD-MCNC: 13.7 G/DL (ref 11.9–15.5)
HGB BLD-MCNC: 13.7 G/DL (ref 11.9–15.5)
IMM GRANULOCYTES # BLD AUTO: 0.01 10*3/MM3 (ref 0–0.03)
IMM GRANULOCYTES NFR BLD AUTO: 0.2 % (ref 0–0.5)
INR PPP: 1.07 (ref 0.9–1.1)
LYMPHOCYTES # BLD AUTO: 1.79 10*3/MM3 (ref 0.9–4.8)
LYMPHOCYTES NFR BLD AUTO: 27.9 % (ref 19.6–45.3)
MCH RBC QN AUTO: 29.8 PG (ref 26.9–32)
MCH RBC QN AUTO: 30.1 PG (ref 26.9–32)
MCHC RBC AUTO-ENTMCNC: 32.9 G/DL (ref 32.4–36.3)
MCHC RBC AUTO-ENTMCNC: 32.9 G/DL (ref 32.4–36.3)
MCV RBC AUTO: 90.7 FL (ref 80.5–98.2)
MCV RBC AUTO: 91.4 FL (ref 80.5–98.2)
MONOCYTES # BLD AUTO: 0.65 10*3/MM3 (ref 0.2–1.2)
MONOCYTES NFR BLD AUTO: 10.1 % (ref 5–12)
NEUTROPHILS # BLD AUTO: 3.74 10*3/MM3 (ref 1.9–8.1)
NEUTROPHILS NFR BLD AUTO: 58.3 % (ref 42.7–76)
PLATELET # BLD AUTO: 273 10*3/MM3 (ref 140–500)
PLATELET # BLD AUTO: 291 10*3/MM3 (ref 140–500)
PMV BLD AUTO: 10.1 FL (ref 6–12)
PMV BLD AUTO: 9.6 FL (ref 6–12)
POTASSIUM BLD-SCNC: 4.2 MMOL/L (ref 3.5–5.2)
PROTHROMBIN TIME: 13.7 SECONDS (ref 11.7–14.2)
RBC # BLD AUTO: 4.55 10*6/MM3 (ref 3.9–5.2)
RBC # BLD AUTO: 4.6 10*6/MM3 (ref 3.9–5.2)
SODIUM BLD-SCNC: 143 MMOL/L (ref 136–145)
WBC NRBC COR # BLD: 6.42 10*3/MM3 (ref 4.5–10.7)
WBC NRBC COR # BLD: 6.62 10*3/MM3 (ref 4.5–10.7)

## 2018-12-29 PROCEDURE — 85027 COMPLETE CBC AUTOMATED: CPT | Performed by: INTERNAL MEDICINE

## 2018-12-29 PROCEDURE — 85730 THROMBOPLASTIN TIME PARTIAL: CPT | Performed by: INTERNAL MEDICINE

## 2018-12-29 PROCEDURE — 80048 BASIC METABOLIC PNL TOTAL CA: CPT | Performed by: INTERNAL MEDICINE

## 2018-12-29 PROCEDURE — 85025 COMPLETE CBC W/AUTO DIFF WBC: CPT | Performed by: INTERNAL MEDICINE

## 2018-12-29 PROCEDURE — 25010000002 HEPARIN (PORCINE) PER 1000 UNITS: Performed by: INTERNAL MEDICINE

## 2018-12-29 PROCEDURE — 82962 GLUCOSE BLOOD TEST: CPT

## 2018-12-29 PROCEDURE — 85610 PROTHROMBIN TIME: CPT | Performed by: INTERNAL MEDICINE

## 2018-12-29 PROCEDURE — 99232 SBSQ HOSP IP/OBS MODERATE 35: CPT | Performed by: PSYCHIATRY & NEUROLOGY

## 2018-12-29 RX ORDER — HEPARIN SODIUM 10000 [USP'U]/100ML
12 INJECTION, SOLUTION INTRAVENOUS
Status: DISCONTINUED | OUTPATIENT
Start: 2018-12-29 | End: 2019-01-02

## 2018-12-29 RX ORDER — HEPARIN SODIUM 5000 [USP'U]/ML
30-60 INJECTION, SOLUTION INTRAVENOUS; SUBCUTANEOUS EVERY 6 HOURS PRN
Status: DISCONTINUED | OUTPATIENT
Start: 2018-12-29 | End: 2019-01-02

## 2018-12-29 RX ADMIN — SODIUM CHLORIDE, PRESERVATIVE FREE 3 ML: 5 INJECTION INTRAVENOUS at 08:20

## 2018-12-29 RX ADMIN — SODIUM CHLORIDE 75 ML/HR: 9 INJECTION, SOLUTION INTRAVENOUS at 10:16

## 2018-12-29 RX ADMIN — SODIUM CHLORIDE, PRESERVATIVE FREE 3 ML: 5 INJECTION INTRAVENOUS at 20:19

## 2018-12-29 RX ADMIN — ATORVASTATIN CALCIUM 80 MG: 80 TABLET, FILM COATED ORAL at 20:19

## 2018-12-29 RX ADMIN — SODIUM CHLORIDE 75 ML/HR: 9 INJECTION, SOLUTION INTRAVENOUS at 19:27

## 2018-12-29 RX ADMIN — LEVOTHYROXINE SODIUM 75 MCG: 75 TABLET ORAL at 05:38

## 2018-12-29 RX ADMIN — HEPARIN SODIUM 12 UNITS/KG/HR: 10000 INJECTION, SOLUTION INTRAVENOUS at 11:00

## 2018-12-29 RX ADMIN — PAROXETINE HYDROCHLORIDE 10 MG: 10 TABLET, FILM COATED ORAL at 06:15

## 2018-12-29 RX ADMIN — HEPARIN SODIUM 3800 UNITS: 5000 INJECTION INTRAVENOUS; SUBCUTANEOUS at 17:21

## 2018-12-29 RX ADMIN — ASPIRIN 325 MG: 325 TABLET ORAL at 08:20

## 2018-12-30 ENCOUNTER — APPOINTMENT (OUTPATIENT)
Dept: CT IMAGING | Facility: HOSPITAL | Age: 83
End: 2018-12-30

## 2018-12-30 LAB
ANION GAP SERPL CALCULATED.3IONS-SCNC: 5.2 MMOL/L
APTT PPP: 109.8 SECONDS (ref 22.7–35.4)
APTT PPP: 66.8 SECONDS (ref 22.7–35.4)
BASOPHILS # BLD AUTO: 0.01 10*3/MM3 (ref 0–0.2)
BASOPHILS # BLD AUTO: 0.01 10*3/MM3 (ref 0–0.2)
BASOPHILS NFR BLD AUTO: 0.2 % (ref 0–1.5)
BASOPHILS NFR BLD AUTO: 0.2 % (ref 0–1.5)
BUN BLD-MCNC: 10 MG/DL (ref 8–23)
BUN/CREAT SERPL: 14.7 (ref 7–25)
CALCIUM SPEC-SCNC: 9.8 MG/DL (ref 8.6–10.5)
CHLORIDE SERPL-SCNC: 116 MMOL/L (ref 98–107)
CO2 SERPL-SCNC: 21.8 MMOL/L (ref 22–29)
CREAT BLD-MCNC: 0.68 MG/DL (ref 0.57–1)
DEPRECATED RDW RBC AUTO: 45.3 FL (ref 37–54)
DEPRECATED RDW RBC AUTO: 45.4 FL (ref 37–54)
EOSINOPHIL # BLD AUTO: 0.14 10*3/MM3 (ref 0–0.7)
EOSINOPHIL # BLD AUTO: 0.16 10*3/MM3 (ref 0–0.7)
EOSINOPHIL NFR BLD AUTO: 2.5 % (ref 0.3–6.2)
EOSINOPHIL NFR BLD AUTO: 2.7 % (ref 0.3–6.2)
ERYTHROCYTE [DISTWIDTH] IN BLOOD BY AUTOMATED COUNT: 13.7 % (ref 11.7–13)
ERYTHROCYTE [DISTWIDTH] IN BLOOD BY AUTOMATED COUNT: 13.7 % (ref 11.7–13)
GFR SERPL CREATININE-BSD FRML MDRD: 81 ML/MIN/1.73
GLUCOSE BLD-MCNC: 94 MG/DL (ref 65–99)
HCT VFR BLD AUTO: 35.7 % (ref 35.6–45.5)
HCT VFR BLD AUTO: 36.5 % (ref 35.6–45.5)
HGB BLD-MCNC: 11.7 G/DL (ref 11.9–15.5)
HGB BLD-MCNC: 12 G/DL (ref 11.9–15.5)
IMM GRANULOCYTES # BLD AUTO: 0 10*3/MM3 (ref 0–0.03)
IMM GRANULOCYTES # BLD AUTO: 0.01 10*3/MM3 (ref 0–0.03)
IMM GRANULOCYTES NFR BLD AUTO: 0 % (ref 0–0.5)
IMM GRANULOCYTES NFR BLD AUTO: 0.2 % (ref 0–0.5)
LYMPHOCYTES # BLD AUTO: 1.17 10*3/MM3 (ref 0.9–4.8)
LYMPHOCYTES # BLD AUTO: 1.4 10*3/MM3 (ref 0.9–4.8)
LYMPHOCYTES NFR BLD AUTO: 20.5 % (ref 19.6–45.3)
LYMPHOCYTES NFR BLD AUTO: 23.3 % (ref 19.6–45.3)
MCH RBC QN AUTO: 29.4 PG (ref 26.9–32)
MCH RBC QN AUTO: 29.5 PG (ref 26.9–32)
MCHC RBC AUTO-ENTMCNC: 32.8 G/DL (ref 32.4–36.3)
MCHC RBC AUTO-ENTMCNC: 32.9 G/DL (ref 32.4–36.3)
MCV RBC AUTO: 89.7 FL (ref 80.5–98.2)
MCV RBC AUTO: 89.7 FL (ref 80.5–98.2)
MONOCYTES # BLD AUTO: 0.46 10*3/MM3 (ref 0.2–1.2)
MONOCYTES # BLD AUTO: 0.52 10*3/MM3 (ref 0.2–1.2)
MONOCYTES NFR BLD AUTO: 7.7 % (ref 5–12)
MONOCYTES NFR BLD AUTO: 9.1 % (ref 5–12)
NEUTROPHILS # BLD AUTO: 3.87 10*3/MM3 (ref 1.9–8.1)
NEUTROPHILS # BLD AUTO: 3.98 10*3/MM3 (ref 1.9–8.1)
NEUTROPHILS NFR BLD AUTO: 66.1 % (ref 42.7–76)
NEUTROPHILS NFR BLD AUTO: 67.7 % (ref 42.7–76)
PLATELET # BLD AUTO: 220 10*3/MM3 (ref 140–500)
PLATELET # BLD AUTO: 227 10*3/MM3 (ref 140–500)
PMV BLD AUTO: 9.4 FL (ref 6–12)
PMV BLD AUTO: 9.7 FL (ref 6–12)
POTASSIUM BLD-SCNC: 3.6 MMOL/L (ref 3.5–5.2)
RBC # BLD AUTO: 3.98 10*6/MM3 (ref 3.9–5.2)
RBC # BLD AUTO: 4.07 10*6/MM3 (ref 3.9–5.2)
SODIUM BLD-SCNC: 143 MMOL/L (ref 136–145)
WBC NRBC COR # BLD: 5.71 10*3/MM3 (ref 4.5–10.7)
WBC NRBC COR # BLD: 6.01 10*3/MM3 (ref 4.5–10.7)

## 2018-12-30 PROCEDURE — 85025 COMPLETE CBC W/AUTO DIFF WBC: CPT | Performed by: INTERNAL MEDICINE

## 2018-12-30 PROCEDURE — 85730 THROMBOPLASTIN TIME PARTIAL: CPT | Performed by: INTERNAL MEDICINE

## 2018-12-30 PROCEDURE — 99231 SBSQ HOSP IP/OBS SF/LOW 25: CPT | Performed by: PSYCHIATRY & NEUROLOGY

## 2018-12-30 PROCEDURE — 70450 CT HEAD/BRAIN W/O DYE: CPT

## 2018-12-30 PROCEDURE — 80048 BASIC METABOLIC PNL TOTAL CA: CPT | Performed by: INTERNAL MEDICINE

## 2018-12-30 PROCEDURE — 25010000002 HEPARIN (PORCINE) PER 1000 UNITS: Performed by: INTERNAL MEDICINE

## 2018-12-30 RX ADMIN — SODIUM CHLORIDE, PRESERVATIVE FREE 3 ML: 5 INJECTION INTRAVENOUS at 20:10

## 2018-12-30 RX ADMIN — HEPARIN SODIUM 13 UNITS/KG/HR: 10000 INJECTION, SOLUTION INTRAVENOUS at 14:24

## 2018-12-30 RX ADMIN — PAROXETINE HYDROCHLORIDE 10 MG: 10 TABLET, FILM COATED ORAL at 07:03

## 2018-12-30 RX ADMIN — LEVOTHYROXINE SODIUM 75 MCG: 75 TABLET ORAL at 07:00

## 2018-12-30 RX ADMIN — SODIUM CHLORIDE, PRESERVATIVE FREE 3 ML: 5 INJECTION INTRAVENOUS at 09:00

## 2018-12-30 RX ADMIN — ATORVASTATIN CALCIUM 80 MG: 80 TABLET, FILM COATED ORAL at 20:10

## 2018-12-31 LAB
ANION GAP SERPL CALCULATED.3IONS-SCNC: 9.2 MMOL/L
APTT PPP: 52.9 SECONDS (ref 22.7–35.4)
APTT PPP: 63.1 SECONDS (ref 22.7–35.4)
BASOPHILS # BLD AUTO: 0.01 10*3/MM3 (ref 0–0.2)
BASOPHILS NFR BLD AUTO: 0.2 % (ref 0–1.5)
BUN BLD-MCNC: 12 MG/DL (ref 8–23)
BUN/CREAT SERPL: 15.8 (ref 7–25)
CALCIUM SPEC-SCNC: 9.9 MG/DL (ref 8.6–10.5)
CHLORIDE SERPL-SCNC: 117 MMOL/L (ref 98–107)
CO2 SERPL-SCNC: 19.8 MMOL/L (ref 22–29)
CREAT BLD-MCNC: 0.76 MG/DL (ref 0.57–1)
DEPRECATED RDW RBC AUTO: 48 FL (ref 37–54)
EOSINOPHIL # BLD AUTO: 0.2 10*3/MM3 (ref 0–0.7)
EOSINOPHIL NFR BLD AUTO: 4 % (ref 0.3–6.2)
ERYTHROCYTE [DISTWIDTH] IN BLOOD BY AUTOMATED COUNT: 14 % (ref 11.7–13)
GFR SERPL CREATININE-BSD FRML MDRD: 72 ML/MIN/1.73
GLUCOSE BLD-MCNC: 97 MG/DL (ref 65–99)
HCT VFR BLD AUTO: 36.9 % (ref 35.6–45.5)
HGB BLD-MCNC: 11.8 G/DL (ref 11.9–15.5)
IMM GRANULOCYTES # BLD AUTO: 0 10*3/MM3 (ref 0–0.03)
IMM GRANULOCYTES NFR BLD AUTO: 0 % (ref 0–0.5)
LYMPHOCYTES # BLD AUTO: 1.36 10*3/MM3 (ref 0.9–4.8)
LYMPHOCYTES NFR BLD AUTO: 27.3 % (ref 19.6–45.3)
MCH RBC QN AUTO: 30.3 PG (ref 26.9–32)
MCHC RBC AUTO-ENTMCNC: 32 G/DL (ref 32.4–36.3)
MCV RBC AUTO: 94.6 FL (ref 80.5–98.2)
MONOCYTES # BLD AUTO: 0.46 10*3/MM3 (ref 0.2–1.2)
MONOCYTES NFR BLD AUTO: 9.2 % (ref 5–12)
NEUTROPHILS # BLD AUTO: 2.95 10*3/MM3 (ref 1.9–8.1)
NEUTROPHILS NFR BLD AUTO: 59.3 % (ref 42.7–76)
PLATELET # BLD AUTO: 211 10*3/MM3 (ref 140–500)
PMV BLD AUTO: 9.4 FL (ref 6–12)
POTASSIUM BLD-SCNC: 3.8 MMOL/L (ref 3.5–5.2)
RBC # BLD AUTO: 3.9 10*6/MM3 (ref 3.9–5.2)
SODIUM BLD-SCNC: 146 MMOL/L (ref 136–145)
WBC NRBC COR # BLD: 4.98 10*3/MM3 (ref 4.5–10.7)

## 2018-12-31 PROCEDURE — 80048 BASIC METABOLIC PNL TOTAL CA: CPT | Performed by: INTERNAL MEDICINE

## 2018-12-31 PROCEDURE — 85730 THROMBOPLASTIN TIME PARTIAL: CPT | Performed by: PSYCHIATRY & NEUROLOGY

## 2018-12-31 PROCEDURE — 99231 SBSQ HOSP IP/OBS SF/LOW 25: CPT | Performed by: PSYCHIATRY & NEUROLOGY

## 2018-12-31 PROCEDURE — 85730 THROMBOPLASTIN TIME PARTIAL: CPT | Performed by: INTERNAL MEDICINE

## 2018-12-31 PROCEDURE — 25010000002 HEPARIN (PORCINE) PER 1000 UNITS: Performed by: INTERNAL MEDICINE

## 2018-12-31 PROCEDURE — 85025 COMPLETE CBC W/AUTO DIFF WBC: CPT | Performed by: INTERNAL MEDICINE

## 2018-12-31 RX ADMIN — HEPARIN SODIUM 17 UNITS/KG/HR: 10000 INJECTION, SOLUTION INTRAVENOUS at 21:29

## 2018-12-31 RX ADMIN — HEPARIN SODIUM 3800 UNITS: 5000 INJECTION INTRAVENOUS; SUBCUTANEOUS at 18:36

## 2018-12-31 RX ADMIN — ATORVASTATIN CALCIUM 80 MG: 80 TABLET, FILM COATED ORAL at 21:01

## 2018-12-31 RX ADMIN — SODIUM CHLORIDE, PRESERVATIVE FREE 3 ML: 5 INJECTION INTRAVENOUS at 09:00

## 2018-12-31 RX ADMIN — PAROXETINE HYDROCHLORIDE 10 MG: 10 TABLET, FILM COATED ORAL at 07:15

## 2018-12-31 RX ADMIN — SODIUM CHLORIDE 75 ML/HR: 9 INJECTION, SOLUTION INTRAVENOUS at 17:55

## 2018-12-31 RX ADMIN — LEVOTHYROXINE SODIUM 75 MCG: 75 TABLET ORAL at 07:15

## 2018-12-31 RX ADMIN — SODIUM CHLORIDE, PRESERVATIVE FREE 3 ML: 5 INJECTION INTRAVENOUS at 21:01

## 2019-01-01 LAB
APTT PPP: 153 SECONDS (ref 22.7–35.4)
APTT PPP: 67.9 SECONDS (ref 22.7–35.4)
BASOPHILS # BLD AUTO: 0.02 10*3/MM3 (ref 0–0.2)
BASOPHILS NFR BLD AUTO: 0.3 % (ref 0–1.5)
DEPRECATED RDW RBC AUTO: 47.7 FL (ref 37–54)
EOSINOPHIL # BLD AUTO: 0.22 10*3/MM3 (ref 0–0.7)
EOSINOPHIL NFR BLD AUTO: 2.9 % (ref 0.3–6.2)
ERYTHROCYTE [DISTWIDTH] IN BLOOD BY AUTOMATED COUNT: 13.8 % (ref 11.7–13)
HCT VFR BLD AUTO: 38.3 % (ref 35.6–45.5)
HGB BLD-MCNC: 11.9 G/DL (ref 11.9–15.5)
IMM GRANULOCYTES # BLD AUTO: 0 10*3/MM3 (ref 0–0.03)
IMM GRANULOCYTES NFR BLD AUTO: 0 % (ref 0–0.5)
LYMPHOCYTES # BLD AUTO: 1.58 10*3/MM3 (ref 0.9–4.8)
LYMPHOCYTES NFR BLD AUTO: 20.9 % (ref 19.6–45.3)
MCH RBC QN AUTO: 29.5 PG (ref 26.9–32)
MCHC RBC AUTO-ENTMCNC: 31.1 G/DL (ref 32.4–36.3)
MCV RBC AUTO: 94.8 FL (ref 80.5–98.2)
MONOCYTES # BLD AUTO: 0.54 10*3/MM3 (ref 0.2–1.2)
MONOCYTES NFR BLD AUTO: 7.1 % (ref 5–12)
NEUTROPHILS # BLD AUTO: 5.2 10*3/MM3 (ref 1.9–8.1)
NEUTROPHILS NFR BLD AUTO: 68.8 % (ref 42.7–76)
PLATELET # BLD AUTO: 201 10*3/MM3 (ref 140–500)
PMV BLD AUTO: 9.5 FL (ref 6–12)
RBC # BLD AUTO: 4.04 10*6/MM3 (ref 3.9–5.2)
WBC NRBC COR # BLD: 7.56 10*3/MM3 (ref 4.5–10.7)

## 2019-01-01 PROCEDURE — 85025 COMPLETE CBC W/AUTO DIFF WBC: CPT | Performed by: INTERNAL MEDICINE

## 2019-01-01 PROCEDURE — 99231 SBSQ HOSP IP/OBS SF/LOW 25: CPT | Performed by: PSYCHIATRY & NEUROLOGY

## 2019-01-01 PROCEDURE — 85730 THROMBOPLASTIN TIME PARTIAL: CPT | Performed by: HOSPITALIST

## 2019-01-01 RX ADMIN — SODIUM CHLORIDE 75 ML/HR: 9 INJECTION, SOLUTION INTRAVENOUS at 20:12

## 2019-01-01 RX ADMIN — LEVOTHYROXINE SODIUM 75 MCG: 75 TABLET ORAL at 06:53

## 2019-01-01 RX ADMIN — ATORVASTATIN CALCIUM 80 MG: 80 TABLET, FILM COATED ORAL at 20:08

## 2019-01-01 RX ADMIN — SODIUM CHLORIDE 75 ML/HR: 9 INJECTION, SOLUTION INTRAVENOUS at 06:53

## 2019-01-01 RX ADMIN — SODIUM CHLORIDE, PRESERVATIVE FREE 3 ML: 5 INJECTION INTRAVENOUS at 20:08

## 2019-01-01 RX ADMIN — SODIUM CHLORIDE, PRESERVATIVE FREE 3 ML: 5 INJECTION INTRAVENOUS at 10:07

## 2019-01-01 RX ADMIN — PAROXETINE HYDROCHLORIDE 10 MG: 10 TABLET, FILM COATED ORAL at 06:52

## 2019-01-01 NOTE — PLAN OF CARE
Problem: Fall Risk (Adult)  Goal: Absence of Fall  Outcome: Ongoing (interventions implemented as appropriate)      Problem: Patient Care Overview  Goal: Plan of Care Review  Outcome: Ongoing (interventions implemented as appropriate)   01/01/19 1710   Coping/Psychosocial   Plan of Care Reviewed With patient   Plan of Care Review   Progress no change   OTHER   Outcome Summary VSS. No changes. PTT therapeutic on heparin gtt.       Problem: Stroke (Ischemic) (Adult)  Goal: Signs and Symptoms of Listed Potential Problems Will be Absent, Minimized or Managed (Stroke)  Outcome: Ongoing (interventions implemented as appropriate)

## 2019-01-01 NOTE — PROGRESS NOTES
Neurology Progress Note      Chief Complaint: Abnormal speech [slurred speech].    Subjective     Subjective: Speech is getting better. otherwise asymptomatic.    Medications:  Current Facility-Administered Medications   Medication Dose Route Frequency Provider Last Rate Last Dose   • atorvastatin (LIPITOR) tablet 80 mg  80 mg Oral Nightly Billy Caceres MD   80 mg at 12/31/18 2101   • heparin (porcine) 5000 UNIT/ML injection 1,900-3,800 Units  30-60 Units/kg Intravenous Q6H PRN Clarence Meza MD   3,800 Units at 12/31/18 1836   • heparin 94209 units/250 mL (100 units/mL) in 0.45 % NaCl infusion  12 Units/kg/hr Intravenous Titrated Clarence Meza MD 8.86 mL/hr at 01/01/19 0407 14 Units/kg/hr at 01/01/19 0407   • levothyroxine (SYNTHROID, LEVOTHROID) tablet 75 mcg  75 mcg Oral Q AM Billy Caceres MD   75 mcg at 01/01/19 0653   • PARoxetine (PAXIL) tablet 10 mg  10 mg Oral QAM Billy Caceres MD   10 mg at 01/01/19 0652   • sodium chloride 0.9 % flush 10 mL  10 mL Intravenous PRN Dagoberto Rodriguez MD       • sodium chloride 0.9 % flush 3 mL  3 mL Intravenous Q12H Billy Caceres MD   3 mL at 12/31/18 2101   • sodium chloride 0.9 % flush 3-10 mL  3-10 mL Intravenous PRN Billy Caceres MD       • Sodium chloride 0.9 % infusion  75 mL/hr Intravenous Continuous Billy Caceres MD 75 mL/hr at 01/01/19 0653 75 mL/hr at 01/01/19 0653       Review of Systems:   -A 14 point review of systems is completed and is negative except for above      Objective      Vital Signs  Temp:  [97.7 °F (36.5 °C)-98.3 °F (36.8 °C)] 97.9 °F (36.6 °C)  Heart Rate:  [56-87] 59  Resp:  [18] 18  BP: (126-158)/(63-94) 126/82    Physical Exam:    HEENT:  Neck supple  CVS:  Regular rate and rhythm.  No murmurs      Neurologic Exam:    -Awake, Alert, Oriented X 3  -Aphasia much better   -No dysarthria  -Follows simple and complex commands    Cranial nerves II through XII  intact.    Motor: (strength out of 5:  1= minimal movement, 2 = movement in plane of gravity, 3 = movement against gravity, 4 = movement against some resistance, 5 = full strength)    -Right Upper Ext: Proximal: 5 Distal: 5  -Left Upper Ext: Proximal: 5 Distal: 5    -Right Lower Ext: Proximal: 5 Distal: 5  -Left Lower Ext: Proximal: 5 Distal: 5    DTR:  2+ throughout in all four extremities    Sensory:  -Intact to light touch, pinprick, temperature, pain, and proprioception    Coordination/Gait:  -No ataxia     Results Review:    I reviewed the patient's new clinical results.    MRI brain and MRA head and neck:    1. Multiple small acute infarcts scattered in the left cerebral  hemisphere predominantly in a watershed type distribution in the left  frontal lobe and left temporoparietal and occipital regions. There is  currently no associated hemorrhage or mass effect.       2. The cervical carotid arteries show no significant stenosis or  irregularity.     3. The intracranial MR angiography shows a segment of complete or nearly  complete occlusion of the left M1 segment over a length of 9 mm and  having a somewhat irregular contour suggesting the presence of acute  intra-arterial thrombus. There is filling of the M1 segment laterally  and subsequent filling of the M2 branches. No aneurysm is seen.    CT HEAD: STABLE WITH NO BLEED TODAY    Assessment/Plan     Hospital Problem List      Cerebrovascular accident (CVA) due to thrombosis of left middle cerebral artery (CMS/HCC)    Hypercalcemia    Essential hypertension    Hypothyroidism    Hyperlipidemia    Dysarthria    Impression:    1.  Left MCA watershed ischemic stroke secondary to left M1 MCA thrombosis secondary to most likely underlying  [undiagnosed] paroxysmal atrial fibrillation leading to cardioembolism      Plan:  - Normotensive blood pressure  - CONTINUE Heparin  low intensity drip until Jan 2nd. WILL TRANSITION from heparin TO ASP/PLAVIX on Wednesday (Jan  2nd) unless telemetry show afib, in which case it will be NOAC.  - PTOT speech evaluation  - DNR/DNI  - EVENT MONITOR X 30 DAYS AT DISCHARGE TO R/O AFIB    Extremely guarded prognosis.      Marcela Ling MD  01/01/19  8:38 AM

## 2019-01-01 NOTE — PLAN OF CARE
Problem: Fall Risk (Adult)  Goal: Absence of Fall  Outcome: Ongoing (interventions implemented as appropriate)      Problem: Patient Care Overview  Goal: Plan of Care Review  Outcome: Ongoing (interventions implemented as appropriate)   01/01/19 0618   Coping/Psychosocial   Plan of Care Reviewed With patient   Plan of Care Review   Progress improving   OTHER   Outcome Summary Patient was medicated as ordered & tolerated well, vss,on heparin drip, Nihss completed, for PTT recheck at 1010, I will continue to monitor.     Goal: Individualization and Mutuality  Outcome: Ongoing (interventions implemented as appropriate)    Goal: Discharge Needs Assessment  Outcome: Ongoing (interventions implemented as appropriate)    Goal: Interprofessional Rounds/Family Conf  Outcome: Ongoing (interventions implemented as appropriate)      Problem: Stroke (Ischemic) (Adult)  Goal: Signs and Symptoms of Listed Potential Problems Will be Absent, Minimized or Managed (Stroke)  Outcome: Ongoing (interventions implemented as appropriate)

## 2019-01-01 NOTE — NURSING NOTE
Request for stroke screening 12/27/18 per Epic order set.  Noted OT, PT and ST have evaluated and signed off.  Will sign off re: inpatient acute rehab.  Please reconsult if status changes.  Thank you--Hailey Genao,  Rehab Adm Nurse

## 2019-01-02 LAB
APTT PPP: 68.6 SECONDS (ref 22.7–35.4)
BASOPHILS # BLD AUTO: 0.01 10*3/MM3 (ref 0–0.2)
BASOPHILS NFR BLD AUTO: 0.2 % (ref 0–1.5)
DEPRECATED RDW RBC AUTO: 46.9 FL (ref 37–54)
EOSINOPHIL # BLD AUTO: 0.2 10*3/MM3 (ref 0–0.7)
EOSINOPHIL NFR BLD AUTO: 3.7 % (ref 0.3–6.2)
ERYTHROCYTE [DISTWIDTH] IN BLOOD BY AUTOMATED COUNT: 14.2 % (ref 11.7–13)
HCT VFR BLD AUTO: 34.7 % (ref 35.6–45.5)
HGB BLD-MCNC: 11.3 G/DL (ref 11.9–15.5)
IMM GRANULOCYTES # BLD AUTO: 0.01 10*3/MM3 (ref 0–0.03)
IMM GRANULOCYTES NFR BLD AUTO: 0.2 % (ref 0–0.5)
LYMPHOCYTES # BLD AUTO: 1.42 10*3/MM3 (ref 0.9–4.8)
LYMPHOCYTES NFR BLD AUTO: 26.4 % (ref 19.6–45.3)
MCH RBC QN AUTO: 29.5 PG (ref 26.9–32)
MCHC RBC AUTO-ENTMCNC: 32.6 G/DL (ref 32.4–36.3)
MCV RBC AUTO: 90.6 FL (ref 80.5–98.2)
MONOCYTES # BLD AUTO: 0.44 10*3/MM3 (ref 0.2–1.2)
MONOCYTES NFR BLD AUTO: 8.2 % (ref 5–12)
NEUTROPHILS # BLD AUTO: 3.31 10*3/MM3 (ref 1.9–8.1)
NEUTROPHILS NFR BLD AUTO: 61.5 % (ref 42.7–76)
PLATELET # BLD AUTO: 205 10*3/MM3 (ref 140–500)
PMV BLD AUTO: 10.4 FL (ref 6–12)
RBC # BLD AUTO: 3.83 10*6/MM3 (ref 3.9–5.2)
WBC NRBC COR # BLD: 5.38 10*3/MM3 (ref 4.5–10.7)

## 2019-01-02 PROCEDURE — 99231 SBSQ HOSP IP/OBS SF/LOW 25: CPT | Performed by: PSYCHIATRY & NEUROLOGY

## 2019-01-02 PROCEDURE — 85730 THROMBOPLASTIN TIME PARTIAL: CPT | Performed by: INTERNAL MEDICINE

## 2019-01-02 PROCEDURE — 85025 COMPLETE CBC W/AUTO DIFF WBC: CPT | Performed by: INTERNAL MEDICINE

## 2019-01-02 PROCEDURE — 25010000002 HEPARIN (PORCINE) PER 1000 UNITS: Performed by: INTERNAL MEDICINE

## 2019-01-02 RX ORDER — PRAVASTATIN SODIUM 20 MG
40 TABLET ORAL DAILY
Qty: 30 TABLET | Refills: 0 | Status: SHIPPED | OUTPATIENT
Start: 2019-01-02 | End: 2019-01-14

## 2019-01-02 RX ADMIN — HEPARIN SODIUM 14 UNITS/KG/HR: 10000 INJECTION, SOLUTION INTRAVENOUS at 01:51

## 2019-01-02 RX ADMIN — SODIUM CHLORIDE 75 ML/HR: 9 INJECTION, SOLUTION INTRAVENOUS at 20:30

## 2019-01-02 RX ADMIN — ATORVASTATIN CALCIUM 80 MG: 80 TABLET, FILM COATED ORAL at 20:17

## 2019-01-02 RX ADMIN — APIXABAN 5 MG: 5 TABLET, FILM COATED ORAL at 09:51

## 2019-01-02 RX ADMIN — LEVOTHYROXINE SODIUM 75 MCG: 75 TABLET ORAL at 06:06

## 2019-01-02 RX ADMIN — SODIUM CHLORIDE, PRESERVATIVE FREE 3 ML: 5 INJECTION INTRAVENOUS at 20:17

## 2019-01-02 RX ADMIN — SODIUM CHLORIDE 75 ML/HR: 9 INJECTION, SOLUTION INTRAVENOUS at 09:51

## 2019-01-02 RX ADMIN — PAROXETINE HYDROCHLORIDE 10 MG: 10 TABLET, FILM COATED ORAL at 06:06

## 2019-01-02 RX ADMIN — SODIUM CHLORIDE, PRESERVATIVE FREE 3 ML: 5 INJECTION INTRAVENOUS at 09:51

## 2019-01-02 RX ADMIN — APIXABAN 5 MG: 5 TABLET, FILM COATED ORAL at 20:17

## 2019-01-02 NOTE — PROGRESS NOTES
Neurology Progress Note      Chief Complaint: Abnormal speech [slurred speech].    Subjective     Subjective: Speech is getting better. otherwise asymptomatic.    Medications:  Current Facility-Administered Medications   Medication Dose Route Frequency Provider Last Rate Last Dose   • apixaban (ELIQUIS) tablet 5 mg  5 mg Oral Q12H Marcela Ling MD       • atorvastatin (LIPITOR) tablet 80 mg  80 mg Oral Nightly Billy Caceres MD   80 mg at 01/01/19 2008   • levothyroxine (SYNTHROID, LEVOTHROID) tablet 75 mcg  75 mcg Oral Q AM Billy Caceres MD   75 mcg at 01/02/19 0606   • PARoxetine (PAXIL) tablet 10 mg  10 mg Oral QAM Billy Caceres MD   10 mg at 01/02/19 0606   • sodium chloride 0.9 % flush 10 mL  10 mL Intravenous PRN Dagoberto Rodriguez MD       • sodium chloride 0.9 % flush 3 mL  3 mL Intravenous Q12H Billy Caceres MD   3 mL at 01/01/19 2008   • sodium chloride 0.9 % flush 3-10 mL  3-10 mL Intravenous PRN Billy Caceres MD       • Sodium chloride 0.9 % infusion  75 mL/hr Intravenous Continuous Billy Caceres MD 75 mL/hr at 01/01/19 2012 75 mL/hr at 01/01/19 2012       Review of Systems:   -A 14 point review of systems is completed and is negative except for above      Objective      Vital Signs  Temp:  [97.8 °F (36.6 °C)-98.8 °F (37.1 °C)] 97.8 °F (36.6 °C)  Heart Rate:  [57] 57  Resp:  [18] 18  BP: (127-136)/(63-69) 128/67    Physical Exam:    HEENT:  Neck supple  CVS:  Regular rate and rhythm.  No murmurs      Neurologic Exam:    -Awake, Alert, Oriented X 3  -Aphasia much better   -No dysarthria  -Follows simple and complex commands    Cranial nerves II through XII intact.    Motor: (strength out of 5:  1= minimal movement, 2 = movement in plane of gravity, 3 = movement against gravity, 4 = movement against some resistance, 5 = full strength)    -Right Upper Ext: Proximal: 5 Distal: 5  -Left Upper Ext: Proximal: 5 Distal:  5    -Right Lower Ext: Proximal: 5 Distal: 5  -Left Lower Ext: Proximal: 5 Distal: 5    DTR:  2+ throughout in all four extremities    Sensory:  -Intact to light touch, pinprick, temperature, pain, and proprioception    Coordination/Gait:  -No ataxia     Results Review:    I reviewed the patient's new clinical results.    MRI brain and MRA head and neck:    1. Multiple small acute infarcts scattered in the left cerebral  hemisphere predominantly in a watershed type distribution in the left  frontal lobe and left temporoparietal and occipital regions. There is  currently no associated hemorrhage or mass effect.       2. The cervical carotid arteries show no significant stenosis or  irregularity.     3. The intracranial MR angiography shows a segment of complete or nearly  complete occlusion of the left M1 segment over a length of 9 mm and  having a somewhat irregular contour suggesting the presence of acute  intra-arterial thrombus. There is filling of the M1 segment laterally  and subsequent filling of the M2 branches. No aneurysm is seen.    CT HEAD: STABLE WITH NO BLEED TODAY    Assessment/Plan     Hospital Problem List      Cerebrovascular accident (CVA) due to thrombosis of left middle cerebral artery (CMS/HCC)    Hypercalcemia    Essential hypertension    Hypothyroidism    Hyperlipidemia    Dysarthria    Impression:    1.  Left MCA watershed ischemic stroke secondary to left M1 MCA thrombosis secondary to most likely underlying  [undiagnosed] paroxysmal atrial fibrillation leading to cardioembolism; failed aspirin therapy.      Plan:  - Normotensive blood pressure  - Had extensive discussion with family yesterday.  Given that it was a large vessel occlusion and patient was on aspirin prior to admission , adding Plavix is not going to significantly change course hence discussion for anticoagulation was undertaken .  Family understands about underlying paroxysmal atrial fibrillation possibility, large vessel  thrombosis and risk of hemorrhage.  Taking everything into consideration they want us to go ahead with anticoagulation with eliquis instead of Coumadin .  - We'll start eliquis 5 mg twice a day  - repeat ct head am and if negative for bleed d/c am  - Will keep the patient for observation today and possible discharge tomorrow.  - Discontinue heparin and antiplatelet therapy  - DNR/DNI  - EVENT MONITOR X 30 DAYS AT DISCHARGE TO R/O AFIB    Extremely guarded prognosis.      Marcela Ling MD  01/02/19  8:38 AM

## 2019-01-02 NOTE — PROGRESS NOTES
Continued Stay Note  [facility]     Patient Name: Yolis Ospina  MRN: [id number]  Today's Date: 1/2/2019    Admit Date: 12/27/2018    Discharge Plan     Row Name 01/02/19 1421       Plan    Plan  Plans are home.  Cost of Eliquis will be $42, pt updated on cost @ bedside, and given a 30 day free copay card.   Ivette Alves RN/CCP        Discharge Codes    No documentation.       Expected Discharge Date and Time     Expected Discharge Date Expected Discharge Time    Pablo 3, 2019             Ivette Alves RN

## 2019-01-02 NOTE — PLAN OF CARE
Problem: Fall Risk (Adult)  Goal: Absence of Fall  Outcome: Ongoing (interventions implemented as appropriate)      Problem: Patient Care Overview  Goal: Plan of Care Review  Outcome: Ongoing (interventions implemented as appropriate)   01/02/19 0510   Coping/Psychosocial   Plan of Care Reviewed With patient   Plan of Care Review   Progress no change   OTHER   Outcome Summary Pt on NIH(0).Pt is alert and oriented x4.Pt denies any pain or discomfort.Continue on Heparin gtt therapeutic at the moment.Pt is assist x1 to the bathroom.Pt desat 88-89%  Pt place on O2 @ 2 L per min per n/c.Will continue to monitor.       Problem: Stroke (Ischemic) (Adult)  Goal: Signs and Symptoms of Listed Potential Problems Will be Absent, Minimized or Managed (Stroke)  Outcome: Ongoing (interventions implemented as appropriate)

## 2019-01-02 NOTE — PLAN OF CARE
Problem: Fall Risk (Adult)  Goal: Absence of Fall  Outcome: Ongoing (interventions implemented as appropriate)      Problem: Patient Care Overview  Goal: Plan of Care Review  Outcome: Ongoing (interventions implemented as appropriate)   01/02/19 1528   Coping/Psychosocial   Plan of Care Reviewed With patient   Plan of Care Review   Progress improving   OTHER   Outcome Summary NIH 0, heparin gtt d/c'd and eliquis started, vss, no c/o, possible d/c 1/3       Problem: Stroke (Ischemic) (Adult)  Goal: Signs and Symptoms of Listed Potential Problems Will be Absent, Minimized or Managed (Stroke)  Outcome: Ongoing (interventions implemented as appropriate)

## 2019-01-02 NOTE — PROGRESS NOTES
[facility]     LOS: 4 days     Subjective:    CC:Neuro Deficit(s)    DIET:  Diet Order   Procedures   • Diet Regular     No cp  No soa  Eating well      Objective:    Vital Signs:  Temp:  [97.8 °F (36.6 °C)-98.8 °F (37.1 °C)] 97.8 °F (36.6 °C)  Heart Rate:  [57] 57  Resp:  [18] 18  BP: (127-136)/(63-69) 128/67    SpO2:  [96 %-98 %] 96 %  on  Flow (L/min):  [2] 2;   Device (Oxygen Therapy): room air  Body mass index is 24.53 kg/m².    Physical Exam   Constitutional: She appears well-developed and well-nourished.   Speech is nearly normal   HENT:   Head: Normocephalic and atraumatic.   Cardiovascular: Exam reveals no friction rub.   No murmur heard.  Pulmonary/Chest: Effort normal and breath sounds normal.   Abdominal: Soft. Bowel sounds are normal. She exhibits no distension. There is no tenderness.   Neurological: She is alert.   Skin: Skin is warm and dry.       Results Review:    Glucose   Date Value Ref Range Status   12/31/2018 97 65 - 99 mg/dL Final     Results from last 7 days   Lab Units  01/02/19   0446   WBC 10*3/mm3  5.38   HEMOGLOBIN g/dL  11.3*   HEMATOCRIT %  34.7*   PLATELETS 10*3/mm3  205     Results from last 7 days   Lab Units  12/31/18   0428   12/27/18   1915   SODIUM mmol/L  146*   < >  140   POTASSIUM mmol/L  3.8   < >  4.1   CHLORIDE mmol/L  117*   < >  109*   CO2 mmol/L  19.8*   < >  20.9*   BUN mg/dL  12   < >  15   CREATININE mg/dL  0.76   < >  0.88   CALCIUM mg/dL  9.9   < >  10.8*   BILIRUBIN mg/dL   --    --   0.4   ALK PHOS U/L   --    --   108   ALT (SGPT) U/L   --    --   18   AST (SGOT) U/L   --    --   18   GLUCOSE mg/dL  97   < >  90    < > = values in this interval not displayed.     Results from last 7 days   Lab Units  01/02/19   0446   12/29/18   1029   INR    --    --   1.07   APTT seconds  68.6*   < >  25.7    < > = values in this interval not displayed.         Results from last 7 days   Lab Units  12/27/18   1915   TROPONIN T ng/mL  <0.010      Cultures:       I have reviewed daily medications and changes in CPOE    Scheduled meds    apixaban 5 mg Oral Q12H   atorvastatin 80 mg Oral Nightly   levothyroxine 75 mcg Oral Q AM   PARoxetine 10 mg Oral QAM   sodium chloride 3 mL Intravenous Q12H         Sodium chloride 75 mL/hr Last Rate: 75 mL/hr (01/01/19 2012)     PRN meds  sodium chloride  •  sodium chloride        Cerebrovascular accident (CVA) due to thrombosis of left middle cerebral artery (CMS/HCC)    Hypercalcemia    Essential hypertension    Hypothyroidism    Hyperlipidemia    Dysarthria        Assessment/Plan:    Left MCA CVA: M1 thrombus on MRA with watershed left sided infarctions. Start low dose heparin gtt and transfered to ICU for monitoring. CT head repeat is unchanged. Possibility of undocumented AFib per Neuro note. Consider Zio if none seen on telemetry while here. Neurology following. Consult Pulmonology.  Echo unrearakable, left atrium is normal and EF is 64%  Dysarthria is resolved.  Likely discharge tomorrow    - HTN: Stable, NS started. Goal -180 for now.    - Hypothyroidism: Synthroid 75, tsh 3.7    -hypernatremia-146, monitor    - Disposition: TBD    -DVT PPX: scd      Narinder Andre MD  01/02/19  9:21 AM

## 2019-01-03 ENCOUNTER — APPOINTMENT (OUTPATIENT)
Dept: CARDIOLOGY | Facility: HOSPITAL | Age: 84
End: 2019-01-03
Attending: PSYCHIATRY & NEUROLOGY

## 2019-01-03 ENCOUNTER — APPOINTMENT (OUTPATIENT)
Dept: CT IMAGING | Facility: HOSPITAL | Age: 84
End: 2019-01-03

## 2019-01-03 VITALS
DIASTOLIC BLOOD PRESSURE: 58 MMHG | OXYGEN SATURATION: 92 % | WEIGHT: 134.48 LBS | HEIGHT: 63 IN | RESPIRATION RATE: 16 BRPM | SYSTOLIC BLOOD PRESSURE: 152 MMHG | TEMPERATURE: 97.6 F | BODY MASS INDEX: 23.83 KG/M2 | HEART RATE: 56 BPM

## 2019-01-03 LAB
ANION GAP SERPL CALCULATED.3IONS-SCNC: 3.7 MMOL/L
APTT PPP: 29.1 SECONDS (ref 22.7–35.4)
BASOPHILS # BLD AUTO: 0.01 10*3/MM3 (ref 0–0.2)
BASOPHILS NFR BLD AUTO: 0.2 % (ref 0–1.5)
BUN BLD-MCNC: 9 MG/DL (ref 8–23)
BUN/CREAT SERPL: 15.3 (ref 7–25)
CALCIUM SPEC-SCNC: 10.1 MG/DL (ref 8.6–10.5)
CHLORIDE SERPL-SCNC: 117 MMOL/L (ref 98–107)
CO2 SERPL-SCNC: 24.3 MMOL/L (ref 22–29)
CREAT BLD-MCNC: 0.59 MG/DL (ref 0.57–1)
DEPRECATED RDW RBC AUTO: 46.4 FL (ref 37–54)
EOSINOPHIL # BLD AUTO: 0.22 10*3/MM3 (ref 0–0.7)
EOSINOPHIL NFR BLD AUTO: 5.4 % (ref 0.3–6.2)
ERYTHROCYTE [DISTWIDTH] IN BLOOD BY AUTOMATED COUNT: 14.1 % (ref 11.7–13)
GFR SERPL CREATININE-BSD FRML MDRD: 96 ML/MIN/1.73
GLUCOSE BLD-MCNC: 97 MG/DL (ref 65–99)
HCT VFR BLD AUTO: 33.4 % (ref 35.6–45.5)
HGB BLD-MCNC: 10.8 G/DL (ref 11.9–15.5)
IMM GRANULOCYTES # BLD AUTO: 0.01 10*3/MM3 (ref 0–0.03)
IMM GRANULOCYTES NFR BLD AUTO: 0.2 % (ref 0–0.5)
LYMPHOCYTES # BLD AUTO: 1.08 10*3/MM3 (ref 0.9–4.8)
LYMPHOCYTES NFR BLD AUTO: 26.4 % (ref 19.6–45.3)
MCH RBC QN AUTO: 29.2 PG (ref 26.9–32)
MCHC RBC AUTO-ENTMCNC: 32.3 G/DL (ref 32.4–36.3)
MCV RBC AUTO: 90.3 FL (ref 80.5–98.2)
MONOCYTES # BLD AUTO: 0.44 10*3/MM3 (ref 0.2–1.2)
MONOCYTES NFR BLD AUTO: 10.8 % (ref 5–12)
NEUTROPHILS # BLD AUTO: 2.34 10*3/MM3 (ref 1.9–8.1)
NEUTROPHILS NFR BLD AUTO: 57.2 % (ref 42.7–76)
PLATELET # BLD AUTO: 173 10*3/MM3 (ref 140–500)
PMV BLD AUTO: 9.7 FL (ref 6–12)
POTASSIUM BLD-SCNC: 3.5 MMOL/L (ref 3.5–5.2)
RBC # BLD AUTO: 3.7 10*6/MM3 (ref 3.9–5.2)
SODIUM BLD-SCNC: 145 MMOL/L (ref 136–145)
WBC NRBC COR # BLD: 4.09 10*3/MM3 (ref 4.5–10.7)

## 2019-01-03 PROCEDURE — 70450 CT HEAD/BRAIN W/O DYE: CPT

## 2019-01-03 PROCEDURE — 85730 THROMBOPLASTIN TIME PARTIAL: CPT | Performed by: INTERNAL MEDICINE

## 2019-01-03 PROCEDURE — 80048 BASIC METABOLIC PNL TOTAL CA: CPT | Performed by: HOSPITALIST

## 2019-01-03 PROCEDURE — 85025 COMPLETE CBC W/AUTO DIFF WBC: CPT | Performed by: INTERNAL MEDICINE

## 2019-01-03 PROCEDURE — 0296T HC EXT ECG > 48HR TO 21 DAY RCRD W/CONECT INTL RCRD: CPT

## 2019-01-03 RX ORDER — AMLODIPINE BESYLATE 5 MG/1
2.5 TABLET ORAL DAILY
Qty: 30 TABLET | Refills: 5 | Status: SHIPPED | OUTPATIENT
Start: 2019-01-03 | End: 2019-04-15 | Stop reason: SDUPTHER

## 2019-01-03 RX ADMIN — SODIUM CHLORIDE, PRESERVATIVE FREE 3 ML: 5 INJECTION INTRAVENOUS at 08:33

## 2019-01-03 RX ADMIN — APIXABAN 5 MG: 5 TABLET, FILM COATED ORAL at 08:33

## 2019-01-03 RX ADMIN — PAROXETINE HYDROCHLORIDE 10 MG: 10 TABLET, FILM COATED ORAL at 06:03

## 2019-01-03 RX ADMIN — LEVOTHYROXINE SODIUM 75 MCG: 75 TABLET ORAL at 06:03

## 2019-01-03 NOTE — PLAN OF CARE
Problem: Fall Risk (Adult)  Goal: Absence of Fall  Outcome: Ongoing (interventions implemented as appropriate)      Problem: Patient Care Overview  Goal: Plan of Care Review  Outcome: Ongoing (interventions implemented as appropriate)   01/03/19 0511   Coping/Psychosocial   Plan of Care Reviewed With patient   Plan of Care Review   Progress improving   OTHER   Outcome Summary VSS. NIH 0. No new complaints. Repeat CT of the head completed this morning. Possible d/c today. Will continue to monitor.        Problem: Stroke (Ischemic) (Adult)  Goal: Signs and Symptoms of Listed Potential Problems Will be Absent, Minimized or Managed (Stroke)  Outcome: Ongoing (interventions implemented as appropriate)

## 2019-01-03 NOTE — PLAN OF CARE
CT head today negative, can be discharge today.  Continue eliquis. follow up in neurology 1 month  Holter monitor at discharge.      Will sign off.

## 2019-01-03 NOTE — DISCHARGE SUMMARY
"             [facility]    ASSOCIATES  [phone]    DISCHARGE SUMMARY  [facility]    Patient Identification:  Name: Yolis Ospina  Age: 89 y.o.  Sex: female  :  10/25/1929  MRN: [id number]  Primary Care Physician: Morales Bauer MD    Admit date: 2018  Discharge date and time:      Discharge Diagnoses:  Cerebrovascular accident (CVA) due to thrombosis of left middle cerebral artery (CMS/HCC)    Hypercalcemia    Essential hypertension    Hypothyroidism    Hyperlipidemia    Dysarthria       History of present illness from H&P:  This is a 89-year-old female with a history of hypertension, hyperlipidemia, hypothyroidism who presented to the emergency room after she noticed slurred speech and \"a thick tongue\" at home after she woke up from a nap this morning.  The last time she was seen normal was around noon.  She arrived at the emergency room around 6.  Her symptoms have now resolved.  She denies any associated headache or visual change.  No weakness or numbness.  She denies any previous history of stroke or TIA.  She denies history of atrial fibrillation.  She recently got over a upper respiratory viral illness and symptoms are fully resolved this time.  She has no other complaints presently.        Hospital Course:       The patient was found to have a Left MCA CVA: M1 thrombus on MRA with watershed left sided infarctions. She was sart low dose heparin gtt and transfered to the ICU for monitoring. CT head repeat is unchanged and final ct head prior to dischagre on 1/3 was unremarkable. The patient will need prolonged monioring for afib on discharge which is arranged    Pulmonology while the patient was in the unit    She had some nocturnal hypoxemia the last 2 nights, she was placed on 2 liters of oxygen. Saturations during the day have been excellent.  I have recommeneded sleep study outpatient.    The patients Echo was unrearakable, left atrium is normal and EF is 64%.    Her " chief complaint on admission was Dysarthria and it is resolved.    Neurology has given the ok for discharge assuming CT head is ok. She will need follow up with neurology in one month.     - HTN: Stable, NS started and has been stopped. Goal  or less. Start lower dose of norvasc 2.5 on discharge.     - Hypothyroidism: Synthroid 75, tsh 3.7     -hypernatremia-146, monitor     - Disposition: TBD     -DVT PPX: scd        Consults:   Consults     Date and Time Order Name Status Description    12/29/2018 1147 Inpatient Pulmonology Consult Completed     12/27/2018 2340 Inpatient Neurology Consult Stroke Completed     12/27/2018 2017 [facility] (on-call MD unless specified) Completed     12/27/2018 1900 Inpatient Neurology Consult Stroke Completed     12/27/2018 1900 Inpatient Neurology Consult Stroke Completed           Results from last 7 days   Lab Units  01/03/19   0519   WBC 10*3/mm3  4.09*   HEMOGLOBIN g/dL  10.8*   HEMATOCRIT %  33.4*   PLATELETS 10*3/mm3  173       Results from last 7 days   Lab Units  01/03/19   0519   SODIUM mmol/L  145   POTASSIUM mmol/L  3.5   CHLORIDE mmol/L  117*   CO2 mmol/L  24.3   BUN mg/dL  9   CREATININE mg/dL  0.59   GLUCOSE mg/dL  97   CALCIUM mg/dL  10.1       Significant Diagnostic Studies:   Lab Results   Component Value Date    WBC 4.09 (L) 01/03/2019    HGB 10.8 (L) 01/03/2019    HCT 33.4 (L) 01/03/2019     01/03/2019     Lab Results   Component Value Date     01/03/2019    K 3.5 01/03/2019     (H) 01/03/2019    CO2 24.3 01/03/2019    BUN 9 01/03/2019    CREATININE 0.59 01/03/2019    GLUCOSE 97 01/03/2019     Lab Results   Component Value Date    CALCIUM 10.1 01/03/2019     No results found for: AST, ALT, ALKPHOS  No results found for: APTT, INR  No results found for: COLORU, CLARITYU, SPECGRAV, PHUR, PROTEINUR, GLUCOSEU, KETONESU, BLOODU, NITRITE, LEUKOCYTESUR, BILIRUBINUR, UROBILINOGEN, RBCUA, WBCUA, BACTERIA, UACOMMENT  No results found for: TROPONINT,  TROPONINI, BNP  No components found for: HGBA1C;2  No components found for: TSH;2    Imaging Results (all)     Procedure Component Value Units Date/Time    CT Head Without Contrast [[id number]] Collected:  01/03/19 0722     Updated:  01/03/19 0722    Narrative:       CRANIAL CT SCAN WITHOUT CONTRAST     CLINICAL HISTORY: Intracranial hemorrhage; R47.1-Dysarthria and  anarthria; E78.2-Mixed hyperlipidemia.     COMPARISON: 12/30/2018.     TECHNIQUE: Radiation dose reduction techniques were utilized, including  automated exposure control and exposure modulation based on body size.  Multiple axial images of the head were obtained without contrast.      FINDINGS:  There is diffuse atrophy. Stable old appearing lacunar type  infarct in the right basal ganglia. There are mild scattered areas of  decreased density in the white matter likely related to chronic ischemic  gliotic changes. The patient's known small infarcts of the left cerebral  hemisphere are much better appreciated on previous MRI. These are not  well seen by noncontrast CT.  There is no hydrocephalus or midline  shift. Bone window images are unremarkable.          Impression:       No acute intracranial abnormality.                             CT Head Without Contrast [[id number]] Collected:  12/30/18 0435     Updated:  12/30/18 0445    Narrative:       CT OF THE HEAD WITHOUT CONTRAST     HISTORY: Stroke     COMPARISON: December 28, 2018     TECHNIQUE: Axial CT imaging was obtained from the vertex of the skull to  the skull base. No IV contrast was Mr.     FINDINGS:  Previously identified areas of acute infarction seen on recent MRI are  difficult to see on this examination, likely due to differences in  technique and small size. I do think there is a focal area of decreased  attenuation within the left frontal lobe which measures 1.2 x 0.9 cm. I  do not think this is significantly changed compared to the prior study.  I'm not convinced I can see any new areas  of decreased attenuation.  There is no midline shift or mass effect. There is no evidence of  neurologic transformation. There is diffuse cerebral atrophy, and  keeping with the age of 89. The visualized paranasal sinuses and mastoid  air cells appear clear. No aggressive osseous abnormalities are seen.       Impression:       Previously identified areas of acute infarct seen on recent MRI are  difficult to see on this examination due to their small size and  differences in technique. I'm not convinced I can see any new areas of  infarction on the current study. I see no evidence of hemorrhagic  transformation.     Radiation dose reduction techniques were utilized, including automated  exposure control and exposure modulation based on body size.     This report was finalized on 12/30/2018 4:42 AM by Dr. Francisca Bonilla M.D.       MRI Brain With & Without Contrast [[id number]] Collected:  12/28/18 2046     Updated:  12/28/18 2059    Narrative:       MR SCAN OF THE BRAIN WITHOUT AND WITH INTRAVENOUS CONTRAST     HISTORY: Slurred speech. Right facial droop.     The MR scan was performed with sagittal, axial, and coronal images and  includes T1 images without and with intravenous contrast. There is  moderate diffuse atrophy and chronic small vessel ischemic change. The  diffusion sequence confirms the presence of multiple small acute  infarcts scattered in the left cerebral hemisphere and having a somewhat  watershed distribution and these measure up to 1.9 cm in size. There is  no evidence of associated hemorrhage or abnormal enhancement or mass  effect at the present time.     CONCLUSION: Multiple small acute infarcts scattered in the left cerebral  hemisphere predominantly in a watershed type distribution in the left  frontal lobe and left temporoparietal and occipital regions. There is  currently no associated hemorrhage or mass effect.     MR ANGIOGRAPHY OF THE HEAD AND NECK WITHOUT AND WITH  INTRAVENOUS  CONTRAST     MR angiography of the head and neck was performed without and with  intravenous contrast and demonstrates the followin. Evaluation for stenosis is based on NASCET criteria. The vertebral  arteries are patent with dominance of the left vertebral artery. Both  supply the basilar artery.     2. The cervical carotid arteries show no significant stenosis or  irregularity.     3. The intracranial MR angiography shows a segment of complete or nearly  complete occlusion of the left M1 segment over a length of 9 mm and  having a somewhat irregular contour suggesting the presence of acute  intra-arterial thrombus. There is filling of the M1 segment laterally  and subsequent filling of the M2 branches. No aneurysm is seen.     This report was finalized on 2018 8:56 PM by Dr. Varun Hernandez M.D.       MRI Angiogram Head Without Contrast [[id number]] Collected:  18     Updated:  18    Narrative:       MR SCAN OF THE BRAIN WITHOUT AND WITH INTRAVENOUS CONTRAST     HISTORY: Slurred speech. Right facial droop.     The MR scan was performed with sagittal, axial, and coronal images and  includes T1 images without and with intravenous contrast. There is  moderate diffuse atrophy and chronic small vessel ischemic change. The  diffusion sequence confirms the presence of multiple small acute  infarcts scattered in the left cerebral hemisphere and having a somewhat  watershed distribution and these measure up to 1.9 cm in size. There is  no evidence of associated hemorrhage or abnormal enhancement or mass  effect at the present time.     CONCLUSION: Multiple small acute infarcts scattered in the left cerebral  hemisphere predominantly in a watershed type distribution in the left  frontal lobe and left temporoparietal and occipital regions. There is  currently no associated hemorrhage or mass effect.     MR ANGIOGRAPHY OF THE HEAD AND NECK WITHOUT AND WITH  INTRAVENOUS  CONTRAST     MR angiography of the head and neck was performed without and with  intravenous contrast and demonstrates the followin. Evaluation for stenosis is based on NASCET criteria. The vertebral  arteries are patent with dominance of the left vertebral artery. Both  supply the basilar artery.     2. The cervical carotid arteries show no significant stenosis or  irregularity.     3. The intracranial MR angiography shows a segment of complete or nearly  complete occlusion of the left M1 segment over a length of 9 mm and  having a somewhat irregular contour suggesting the presence of acute  intra-arterial thrombus. There is filling of the M1 segment laterally  and subsequent filling of the M2 branches. No aneurysm is seen.     This report was finalized on 2018 8:56 PM by Dr. Varun Hernandez M.D.       MRI Angiogram Neck With & Without Contrast [[id number]] Collected:  18     Updated:  18    Narrative:       MR SCAN OF THE BRAIN WITHOUT AND WITH INTRAVENOUS CONTRAST     HISTORY: Slurred speech. Right facial droop.     The MR scan was performed with sagittal, axial, and coronal images and  includes T1 images without and with intravenous contrast. There is  moderate diffuse atrophy and chronic small vessel ischemic change. The  diffusion sequence confirms the presence of multiple small acute  infarcts scattered in the left cerebral hemisphere and having a somewhat  watershed distribution and these measure up to 1.9 cm in size. There is  no evidence of associated hemorrhage or abnormal enhancement or mass  effect at the present time.     CONCLUSION: Multiple small acute infarcts scattered in the left cerebral  hemisphere predominantly in a watershed type distribution in the left  frontal lobe and left temporoparietal and occipital regions. There is  currently no associated hemorrhage or mass effect.     MR ANGIOGRAPHY OF THE HEAD AND NECK WITHOUT AND WITH  INTRAVENOUS  CONTRAST     MR angiography of the head and neck was performed without and with  intravenous contrast and demonstrates the followin. Evaluation for stenosis is based on NASCET criteria. The vertebral  arteries are patent with dominance of the left vertebral artery. Both  supply the basilar artery.     2. The cervical carotid arteries show no significant stenosis or  irregularity.     3. The intracranial MR angiography shows a segment of complete or nearly  complete occlusion of the left M1 segment over a length of 9 mm and  having a somewhat irregular contour suggesting the presence of acute  intra-arterial thrombus. There is filling of the M1 segment laterally  and subsequent filling of the M2 branches. No aneurysm is seen.     This report was finalized on 2018 8:56 PM by Dr. Varun Hernandez M.D.       CT Head Without Contrast Stroke Protocol [[id number]] Collected:  18     Updated:  18    Narrative:       CT SCAN OF THE BRAIN WITHOUT CONTRAST     HISTORY: Slurred speech. Right facial droop.     TECHNIQUE: The CT scan was performed as an emergency procedure through  the brain without contrast.      FINDINGS: There is prominent diffuse atrophy and chronic small vessel  ischemic change similar to the study of 2011. There is no evidence  of acute intracranial hemorrhage or mass effect. There is mild mucosal  thickening in the right maxillary sinus and scattered in the ethmoid air  cells.                 Radiation dose reduction techniques were utilized, including automated  exposure control and exposure modulation based on body size.     This report was finalized on 2018 8:36 PM by Dr. Varun Hernandez M.D.       XR Chest 1 View [[id number]] Collected:  18     Updated:  18    Narrative:       PORTABLE CHEST 2018 AT 7:12 PM     CLINICAL HISTORY: Possible acute CVA. Rule out aspiration.     The lungs are fairly well-expanded and  appear free of infiltrates. There  are no pleural effusions. The heart is normal in size.     IMPRESSIONS: No evidence of acute disease within the chest.     This report was finalized on 12/27/2018 7:52 PM by Dr. Tim Miller M.D.         No results found for: SITE, ALLENTEST, PHART, EET6KMX, PO2ART, EVW1VVU, BASEEXCESS, Q4AYPMOO, HGBBG, HCTABG, OXYHEMOGLOBI, METHHGBN, CARBOXYHGB, CO2CT, BAROMETRIC, MODALITY, FIO2       Discharge Medications      New Medications      Instructions Start Date   apixaban 5 MG tablet tablet  Commonly known as:  ELIQUIS   5 mg, Oral, Every 12 Hours Scheduled         Changes to Medications      Instructions Start Date   amLODIPine 5 MG tablet  Commonly known as:  NORVASC  What changed:  how much to take   2.5 mg, Oral, Daily      levothyroxine 75 MCG tablet  Commonly known as:  UNITHROID  What changed:  additional instructions   75 mcg, Oral, Daily      PARoxetine 10 MG tablet  Commonly known as:  PAXIL  What changed:  additional instructions   10 mg, Oral, Every Morning, For stress      pravastatin 20 MG tablet  Commonly known as:  PRAVACHOL  What changed:  how much to take   40 mg, Oral, Daily         Continue These Medications      Instructions Start Date   ALPRAZolam 0.25 MG tablet  Commonly known as:  XANAX   0.25 mg, Oral, Nightly PRN               Patient Instructions:       No future appointments.     Follow-up Information     Morales Bauer MD .    Specialty:  Family Medicine  Contact information:  [id number] [id number]  [phone]                   Discharge Order (From admission, onward)    Start     Ordered    01/02/19 1136  Discharge patient  Once     Expected Discharge Date:  01/03/19    Discharge Disposition:  Home or Self Care    Physician of Record for Attribution - Please select from Treatment Team:  DIANA OCONNELL [[id number]]    Review needed by CMO to determine Physician of Record:  No       Question Answer Comment   Physician of  Record for Attribution - Please select from Treatment Team NARINDER ANDRE    Review needed by CMO to determine Physician of Record No        01/02/19 1136            Discharge instructions:  Follow up with your primary care provider in 1-2 weeks with a cbc and cmp     F/u one month neurology    I discucced with the patient the need for outpatient sleep    Total time spent discharging patient including evaluation, post hospitalization follow up,  medication and post hospitalization instructions and education, total time exceeds 30 minutes.        Signed:  Narinder Andre MD  1/3/2019  10:43 AM

## 2019-01-04 ENCOUNTER — READMISSION MANAGEMENT (OUTPATIENT)
Dept: CALL CENTER | Facility: HOSPITAL | Age: 84
End: 2019-01-04

## 2019-01-04 NOTE — PROGRESS NOTES
Case Management Discharge Note    Final Note: Pt was dc'd home    Destination      No service has been selected for the patient.      Durable Medical Equipment      No service has been selected for the patient.      Dialysis/Infusion      No service has been selected for the patient.      Home Medical Care      No service has been selected for the patient.      Community Resources      No service has been selected for the patient.        Other: Other    Final Discharge Disposition Code: 01 - home or self-care

## 2019-01-05 ENCOUNTER — READMISSION MANAGEMENT (OUTPATIENT)
Dept: CALL CENTER | Facility: HOSPITAL | Age: 84
End: 2019-01-05

## 2019-01-05 NOTE — OUTREACH NOTE
Stroke Week 1 Survey      Responses   Facility patient discharged from?  [place]   Does the patient have one of the following disease processes/diagnoses(primary or secondary)?  Stroke (TIA)   Is there a successful TCM telephone encounter documented?  No   Week 1 attempt successful?  Yes   Call start time  1027   Call end time  1036   Discharge diagnosis  CVA d/t thrombosis   Is patient permission given to speak with other caregiver?  Yes   List who call center can speak with  daughter   Person spoke with today (if not patient) and relationship  Daughter--Teresa   Medication alerts for this patient  Eliquis started    Meds reviewed with patient/caregiver?  Yes   Is the patient having any side effects they believe may be caused by any medication additions or changes?  No   Does the patient have all medications ordered at discharge?  Yes   Is the patient taking all medications as directed (includes completed medication regime)?  Yes   Does the patient have a primary care provider?   Yes   Does the patient have an appointment with their PCP within 7 days of discharge?  No   What is preventing the patient from scheduling follow up appointments within 7 days of discharge?  Haven't had time   Nursing Interventions  Verified appointment date/time/provider, Educated patient on importance of making appointment   Has the patient kept scheduled appointments due by today?  N/A   Comments  Enc daughter to make all f/u appts for pt   Has home health visited the patient within 72 hours of discharge?  N/A   Psychosocial issues?  No   Does the patient require any assistance with activities of daily living such as eating, bathing, dressing, walking, etc.?  No   Does the patient have any residual symptoms from stroke/TIA?  No   Does the patient understand the diet ordered at discharge?  Yes   Did the patient receive a copy of their discharge instructions?  Yes   Nursing interventions  Reviewed instructions with patient   What is the  patient's perception of their health status since discharge?  Improving   Nursing interventions  Nurse provided patient education   Is the patient able to teach back FAST for Stroke?  Yes   Is the patient/caregiver able to teach back the risk factors for a stroke?  High blood pressure-goal below 120/80, Sleep apnea, Physical inactivity and obesity   Is the patient/caregiver able to teach back signs and symptoms related to disease process for when to call PCP?  Yes   Is the patient/caregiver able to teach back signs and symptoms related to disease process for when to call 911?  Yes   Is the patient/caregiver able to teach back the hierarchy of who to call/visit for symptoms/problems? PCP, Specialist, Home health nurse, Urgent Care, ED, 911  Yes   Additional teach back comments  Enc pt and family to watch for any s/s of CVA and to make all f/u appts   Week 1 call completed?  Yes          Vera Mohr RN

## 2019-01-14 ENCOUNTER — READMISSION MANAGEMENT (OUTPATIENT)
Dept: CALL CENTER | Facility: HOSPITAL | Age: 84
End: 2019-01-14

## 2019-01-14 ENCOUNTER — OFFICE VISIT (OUTPATIENT)
Dept: FAMILY MEDICINE CLINIC | Facility: CLINIC | Age: 84
End: 2019-01-14

## 2019-01-14 VITALS
OXYGEN SATURATION: 100 % | TEMPERATURE: 97.9 F | RESPIRATION RATE: 16 BRPM | WEIGHT: 132 LBS | DIASTOLIC BLOOD PRESSURE: 72 MMHG | HEIGHT: 63 IN | SYSTOLIC BLOOD PRESSURE: 112 MMHG | BODY MASS INDEX: 23.39 KG/M2 | HEART RATE: 56 BPM

## 2019-01-14 DIAGNOSIS — E78.2 MIXED HYPERLIPIDEMIA: ICD-10-CM

## 2019-01-14 DIAGNOSIS — Z86.79 HISTORY OF ATRIAL FIBRILLATION: ICD-10-CM

## 2019-01-14 DIAGNOSIS — Z09 HOSPITAL DISCHARGE FOLLOW-UP: Primary | ICD-10-CM

## 2019-01-14 DIAGNOSIS — D64.9 LOW HEMOGLOBIN: ICD-10-CM

## 2019-01-14 DIAGNOSIS — Z86.73 HISTORY OF STROKE: ICD-10-CM

## 2019-01-14 DIAGNOSIS — E03.9 ACQUIRED HYPOTHYROIDISM: ICD-10-CM

## 2019-01-14 DIAGNOSIS — I10 ESSENTIAL HYPERTENSION: ICD-10-CM

## 2019-01-14 DIAGNOSIS — G47.34 NOCTURNAL HYPOXEMIA: ICD-10-CM

## 2019-01-14 PROCEDURE — 99496 TRANSJ CARE MGMT HIGH F2F 7D: CPT | Performed by: PHYSICIAN ASSISTANT

## 2019-01-14 RX ORDER — PRAVASTATIN SODIUM 40 MG
40 TABLET ORAL DAILY
Qty: 90 TABLET | Refills: 3 | Status: SHIPPED | OUTPATIENT
Start: 2019-01-14 | End: 2019-08-20 | Stop reason: SDUPTHER

## 2019-01-14 RX ORDER — LEVOTHYROXINE SODIUM 0.07 MG/1
75 TABLET ORAL DAILY
Qty: 90 TABLET | Refills: 1 | Status: SHIPPED | OUTPATIENT
Start: 2019-01-14 | End: 2020-03-03 | Stop reason: SDUPTHER

## 2019-01-14 NOTE — OUTREACH NOTE
Stroke Week 2 Survey      Responses   Facility patient discharged from?  [facility]   Does the patient have one of the following disease processes/diagnoses(primary or secondary)?  Stroke (TIA)   Week 2 attempt successful?  No   Unsuccessful attempts  Attempt 1          Vera Mohr RN

## 2019-01-14 NOTE — PROGRESS NOTES
"Transitional Care Follow Up Visit  Subjective     Yolis Ospina is a 89 y.o. female who presents for a transitional care management visit.    Within 48 business hours after discharge our office contacted her via telephone to coordinate her care and needs.      I reviewed and discussed the details of that call along with the discharge summary, hospital problems, inpatient lab results, inpatient diagnostic studies, and consultation reports with Yolis.     Current outpatient and discharge medications have been reconciled for the patient.    No flowsheet data found.  Risk for Readmission (LACE) Score: 9 (1/3/2019  6:01 AM)      History of Present Illness   Course During Hospital Stay:  12-27-18;  1-3-19    Dysarthria   Current outpatient and discharge medications have been reconciled for the patient.  Reviewed by: Tonya Malcolm PA-C       History of present illness from H&P:  This is a 89-year-old female with a history of hypertension, hyperlipidemia, hypothyroidism who presented to the emergency room after she noticed slurred speech and \"a thick tongue\" at home after she woke up from a nap this morning.  The last time she was seen normal was around noon.  She arrived at the emergency room around 6.  Her symptoms have now resolved.  She denies any associated headache or visual change.  No weakness or numbness.  She denies any previous history of stroke or TIA.  She denies history of atrial fibrillation.  She recently got over a upper respiratory viral illness and symptoms are fully resolved this time.  She has no other complaints presently.           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 Start low dose heparin gtt and transfered to ICU for monitoring. CT head repeat is unchanged and final ct head prior to d/c on 1/3 was unremarkable. The patient will need prolonged monioring for afib on discharge.      Pulmonology while the patient was in the unit     She had " some nocturnal hypoxemia the last 2 nights, she was placed on 2 liters of oxygen. Saturations during the day have been excellent.  I have recommeneded sleep study outpatient.     The patients Echo was unrearakable, left atrium is normal and EF is 64%.     Her cheifcomplaint on admoission was Dysarthria and it is resolved.     Neurology has given the ok for discharge assuming CT head is ok. She will need follow up with neurology in one month.     - HTN: Stable, NS started and has been stopper. Goal  or lesss. Start lower huerta of norvasc 2.5 on discharge.     - Hypothyroidism: Synthroid 75, tsh 3.7     -hypernatremia-146, monitor     - Disposition: TBD     -DVT PPX: scd      she needs f/u with neurologist d/t CVA and the sleep study and noted in hosp notes and this drop in O2 during sleep and PAF.  Needs to see cardiologist    I need her to f/u with Dr. Bauer about the thyroid and parathyroid  She will move her Paxil and statin back to PM dosing    Lab Results   Component Value Date    WBC 4.09 (L) 01/03/2019    HGB 10.8 (L) 01/03/2019    HCT 33.4 (L) 01/03/2019    MCV 90.3 01/03/2019     01/03/2019     Lab Results   Component Value Date    GLUCOSE 97 01/03/2019    BUN 9 01/03/2019    CREATININE 0.59 01/03/2019    EGFRIFNONA 96 01/03/2019    EGFRIFAFRI 72 05/16/2018    BCR 15.3 01/03/2019    K 3.5 01/03/2019    CO2 24.3 01/03/2019    CALCIUM 10.1 01/03/2019    PROTENTOTREF 7.8 05/16/2018    ALBUMIN 3.90 12/27/2018    LABIL2 1.5 05/16/2018    AST 18 12/27/2018    ALT 18 12/27/2018     Lab Results   Component Value Date    GLUCOSE 97 01/03/2019    CALCIUM 10.1 01/03/2019     01/03/2019    K 3.5 01/03/2019    CO2 24.3 01/03/2019     (H) 01/03/2019    BUN 9 01/03/2019    CREATININE 0.59 01/03/2019    EGFRIFAFRI 72 05/16/2018    EGFRIFNONA 96 01/03/2019    BCR 15.3 01/03/2019    ANIONGAP 3.7 01/03/2019   her pravastatin was increased to 40mg d/t LDL at 80---need <70    I need to f/u on renal  "functions, electrolytes, hgb---CBC  Has holter monitor on now and due back 17th     Echo  Interpretation Summary        · Estimated EF = 64%.  · Left ventricular systolic function is normal.     There is no significant valvular heart disease.            \"Neurology has given the ok for discharge assuming CT head is ok. She will need follow up with neurology in one month.\"    The following portions of the patient's history were reviewed and updated as appropriate: allergies, current medications, past family history, past medical history, past social history, past surgical history and problem list.    Review of Systems   Constitutional: Negative for activity change, appetite change and unexpected weight change.   HENT: Negative for nosebleeds and trouble swallowing.    Eyes: Negative for pain and visual disturbance.   Respiratory: Negative for chest tightness, shortness of breath and wheezing.    Cardiovascular: Negative for chest pain and palpitations.   Gastrointestinal: Negative for abdominal pain and blood in stool.   Endocrine: Negative.    Genitourinary: Negative for difficulty urinating and hematuria.   Musculoskeletal: Negative for joint swelling.   Skin: Negative for color change and rash.   Allergic/Immunologic: Negative.    Neurological: Negative for syncope and speech difficulty.   Hematological: Negative for adenopathy.   Psychiatric/Behavioral: Negative for agitation and confusion. The patient is nervous/anxious.    All other systems reviewed and are negative.      Objective   Physical Exam   Constitutional: She is oriented to person, place, and time. She appears well-developed and well-nourished. No distress.   HENT:   Head: Normocephalic and atraumatic.   Eyes: Conjunctivae and EOM are normal. Pupils are equal, round, and reactive to light. Right eye exhibits no discharge. Left eye exhibits no discharge. No scleral icterus.   Neck: Normal range of motion. Neck supple. No tracheal deviation present. No " thyromegaly present.   Cardiovascular: Normal rate, regular rhythm, normal heart sounds, intact distal pulses and normal pulses. Exam reveals no gallop.   No murmur heard.  Pulmonary/Chest: Effort normal and breath sounds normal. No respiratory distress. She has no wheezes. She has no rales.   Musculoskeletal: Normal range of motion.   Neurological: She is alert and oriented to person, place, and time. She exhibits normal muscle tone. Coordination normal.   Skin: Skin is warm. No rash noted. No erythema. No pallor.   Psychiatric: She has a normal mood and affect. Her behavior is normal. Judgment and thought content normal.   Nursing note and vitals reviewed.      Assessment/Plan   Problems Addressed this Visit        Cardiovascular and Mediastinum    Essential hypertension    Relevant Orders    Comprehensive Metabolic Panel    CBC & Differential    Hyperlipidemia    Relevant Medications    pravastatin (PRAVACHOL) 40 MG tablet    Other Relevant Orders    Comprehensive Metabolic Panel    CBC & Differential       Respiratory    Nocturnal hypoxemia    Relevant Orders    Ambulatory Referral to Cardiology    Ambulatory Referral to Sleep Medicine    Ambulatory Referral to Neurology    Comprehensive Metabolic Panel    CBC & Differential       Endocrine    Hypothyroidism    Relevant Medications    levothyroxine (UNITHROID) 75 MCG tablet    Other Relevant Orders    Comprehensive Metabolic Panel    CBC & Differential       Other    History of atrial fibrillation    Relevant Orders    Ambulatory Referral to Cardiology    Ambulatory Referral to Sleep Medicine    Ambulatory Referral to Neurology    Comprehensive Metabolic Panel    CBC & Differential    Hospital discharge follow-up - Primary    Relevant Orders    Ambulatory Referral to Cardiology    Ambulatory Referral to Sleep Medicine    Ambulatory Referral to Neurology    Comprehensive Metabolic Panel    CBC & Differential    History of stroke    Relevant Orders    Ambulatory  Referral to Cardiology    Ambulatory Referral to Sleep Medicine    Ambulatory Referral to Neurology    Comprehensive Metabolic Panel    CBC & Differential      Other Visit Diagnoses     Low hemoglobin        Relevant Orders    Comprehensive Metabolic Panel    CBC & Differential

## 2019-01-14 NOTE — PATIENT INSTRUCTIONS
Low glycemic index diet  Exercise 30 minutes most days of the week  Make sure you get results on any labs or tests we ordered today  We discussed medications and how to take them as prescribed  Sleep 6-8 hours each night if possible  If you have not signed up for DVTelt, please activate your code ASAP from your After Visit Summary today    LDL goal <100  LDL goal if heart disease <70  HDL goal >60  Triglyceride goal <150  BP goal =<130/80  Fasting glucose <100

## 2019-01-15 ENCOUNTER — READMISSION MANAGEMENT (OUTPATIENT)
Dept: CALL CENTER | Facility: HOSPITAL | Age: 84
End: 2019-01-15

## 2019-01-15 LAB
ALBUMIN SERPL-MCNC: 4.4 G/DL (ref 3.5–5.2)
ALBUMIN/GLOB SERPL: 1.4 G/DL
ALP SERPL-CCNC: 110 U/L (ref 39–117)
ALT SERPL-CCNC: 23 U/L (ref 1–33)
AST SERPL-CCNC: 26 U/L (ref 1–32)
BASOPHILS # BLD AUTO: 0.02 10*3/MM3 (ref 0–0.2)
BASOPHILS NFR BLD AUTO: 0.3 % (ref 0–1.5)
BILIRUB SERPL-MCNC: 0.6 MG/DL (ref 0.1–1.2)
BUN SERPL-MCNC: 13 MG/DL (ref 8–23)
BUN/CREAT SERPL: 15.7 (ref 7–25)
CALCIUM SERPL-MCNC: 11.4 MG/DL (ref 8.6–10.5)
CHLORIDE SERPL-SCNC: 105 MMOL/L (ref 98–107)
CO2 SERPL-SCNC: 25.6 MMOL/L (ref 22–29)
CREAT SERPL-MCNC: 0.83 MG/DL (ref 0.57–1)
EOSINOPHIL # BLD AUTO: 0.17 10*3/MM3 (ref 0–0.7)
EOSINOPHIL NFR BLD AUTO: 2.7 % (ref 0.3–6.2)
ERYTHROCYTE [DISTWIDTH] IN BLOOD BY AUTOMATED COUNT: 14.3 % (ref 11.7–13)
GLOBULIN SER CALC-MCNC: 3.2 GM/DL
GLUCOSE SERPL-MCNC: 93 MG/DL (ref 65–99)
HCT VFR BLD AUTO: 44.4 % (ref 35.6–45.5)
HGB BLD-MCNC: 13.7 G/DL (ref 11.9–15.5)
IMM GRANULOCYTES # BLD AUTO: 0 10*3/MM3 (ref 0–0.03)
IMM GRANULOCYTES NFR BLD AUTO: 0 % (ref 0–0.5)
LYMPHOCYTES # BLD AUTO: 1.82 10*3/MM3 (ref 0.9–4.8)
LYMPHOCYTES NFR BLD AUTO: 29.4 % (ref 19.6–45.3)
MCH RBC QN AUTO: 29.8 PG (ref 26.9–32)
MCHC RBC AUTO-ENTMCNC: 30.9 G/DL (ref 32.4–36.3)
MCV RBC AUTO: 96.7 FL (ref 80.5–98.2)
MONOCYTES # BLD AUTO: 0.47 10*3/MM3 (ref 0.2–1.2)
MONOCYTES NFR BLD AUTO: 7.6 % (ref 5–12)
NEUTROPHILS # BLD AUTO: 3.71 10*3/MM3 (ref 1.9–8.1)
NEUTROPHILS NFR BLD AUTO: 60 % (ref 42.7–76)
PLATELET # BLD AUTO: 325 10*3/MM3 (ref 140–500)
POTASSIUM SERPL-SCNC: 5.1 MMOL/L (ref 3.5–5.2)
PROT SERPL-MCNC: 7.6 G/DL (ref 6–8.5)
RBC # BLD AUTO: 4.59 10*6/MM3 (ref 3.9–5.2)
SODIUM SERPL-SCNC: 142 MMOL/L (ref 136–145)
WBC # BLD AUTO: 6.19 10*3/MM3 (ref 4.5–10.7)

## 2019-01-15 NOTE — OUTREACH NOTE
Stroke Week 2 Survey      Responses   Facility patient discharged from?  [place]   Does the patient have one of the following disease processes/diagnoses(primary or secondary)?  Stroke (TIA)   Week 2 attempt successful?  Yes   Call start time  1223   Call end time  1229   Discharge diagnosis  CVA d/t thrombosis   Meds reviewed with patient/caregiver?  Yes   Is the patient having any side effects they believe may be caused by any medication additions or changes?  No   Does the patient have all medications ordered at discharge?  Yes   Is the patient taking all medications as directed (includes completed medication regime)?  Yes   Medication comments  NP changed some medications and she was concerned with her right to do this.   Does the patient have a primary care provider?   Yes   Has the patient kept scheduled appointments due by today?  Yes   Has home health visited the patient within 72 hours of discharge?  N/A   Psychosocial issues?  No   Does the patient require any assistance with activities of daily living such as eating, bathing, dressing, walking, etc.?  No   Does the patient have any residual symptoms from stroke/TIA?  No   Does the patient understand the diet ordered at discharge?  Yes   Did the patient receive a copy of their discharge instructions?  Yes   Nursing interventions  Reviewed instructions with patient   What is the patient's perception of their health status since discharge?  Improving   Nursing interventions  Nurse provided patient education   Is the patient able to teach back FAST for Stroke?  Yes   Is the patient/caregiver able to teach back the risk factors for a stroke?  High blood pressure-goal below 120/80, High Cholesterol, Sleep apnea, Physical inactivity and obesity   Is the patient/caregiver able to teach back signs and symptoms related to disease process for when to call PCP?  Yes   Is the patient/caregiver able to teach back signs and symptoms related to disease process for  when to call 911?  Yes   Is the patient/caregiver able to teach back the hierarchy of who to call/visit for symptoms/problems? PCP, Specialist, Home health nurse, Urgent Care, ED, 911  Yes   Additional teach back comments  Encouraged f/u appt with cardiolgy to discuss heart monitor results.   Week 2 call completed?  Yes          Shari Miller RN

## 2019-01-17 ENCOUNTER — TELEPHONE (OUTPATIENT)
Dept: NEUROLOGY | Facility: CLINIC | Age: 84
End: 2019-01-17

## 2019-01-23 ENCOUNTER — READMISSION MANAGEMENT (OUTPATIENT)
Dept: CALL CENTER | Facility: HOSPITAL | Age: 84
End: 2019-01-23

## 2019-01-24 ENCOUNTER — READMISSION MANAGEMENT (OUTPATIENT)
Dept: CALL CENTER | Facility: HOSPITAL | Age: 84
End: 2019-01-24

## 2019-01-24 NOTE — OUTREACH NOTE
Stroke Week 3 Survey      Responses   Facility patient discharged from?  [employer] [facility]   Does the patient have one of the following disease processes/diagnoses(primary or secondary)?  Stroke (TIA)   Week 3 attempt successful?  No   Unsuccessful attempts  Attempt 2          Anna King RN

## 2019-01-28 PROCEDURE — 0298T HOLTER MONITOR - 72 HOUR UP TO 21 DAY: CPT | Performed by: INTERNAL MEDICINE

## 2019-02-06 NOTE — PROGRESS NOTES
DOS: 2019  NAME: Yolis Ospina   : 10/25/1929  PCP: Morales Viveros    Chief complaint: stroke follow up  Pt seen in follow up today, however the problem is new to the examiner.    Neurological Problem and Interval History:  89 y.o. RH female with a PMH of HTN, HLP, history of breast cancer s/p mastectomy/radiation , kidney cancer s/p nephrectomy , thyroid dysfunction, and vitamin D deficiency who presented to [facility] at the end of 2018 after she woke up from a nap with word finding difficulty. She is accompanied by her niece, Zuleika. NIH score was 1 and patient was out of the window for IV TPA.  Prior to arrival the patient was not on antiplatelet or anticoagulation however she was taking Pravachol 20 mg daily.  MRI of the brain showed multiple small acute cortical  infarcts scattered in the left cerebral hemisphere.   MRA head and neck showed complete or nearly complete occlusion of the left M1 segment with a somewhat irregular contour suggesting the presence of acute intra-arterial thrombus.  Dr. Ling felt that the patient probably most likely had undiagnosed paroxysmal A. fib and started the patient on a heparin drip.  TTE was completed and showed EF of 64% with normal left atrial size and volume.  He was ultimately decided to empirically placed the patient on Eliquis 5 mg twice a day.  Antiplatelet was stopped.  Eliquis was started on 19 and the following day CT head was negative for hemorrhage.  She was discharged on  Increased dose of pravastatin 40 mg daily.  Her LDL was 80 and her hemoglobin A1c was 5.3%.     Zio Patch was worn for nearly 2 weeks after discharge and showed normal sinus rhythm with rare PACs and 10 episodes of SVT with the longest episode lasting 7 beats.    Since discharge the patient has returned to her home where she lives along. She has not driven since her stroke. She has had a mild right sided headache that resolved with half a tylenol. She saw her  PCP, who decreased her eliquis dose to 2.5 mg BID. Patient is going to follow up with cardiology. Patient states her speech has returned to baseline.       Review of Systems:        Review of Systems   Constitutional: Positive for appetite change and fatigue. Negative for activity change.   HENT: Negative for ear pain, facial swelling and hearing loss.    Eyes: Negative for pain, redness and itching.   Respiratory: Negative for cough, choking and shortness of breath.    Cardiovascular: Negative for chest pain and leg swelling.   Gastrointestinal: Negative for abdominal pain, nausea and vomiting.   Endocrine: Negative for cold intolerance and heat intolerance.   Musculoskeletal: Negative for arthralgias, back pain and myalgias.   Skin: Negative for color change, pallor, rash and wound.   Allergic/Immunologic: Negative for environmental allergies and food allergies.   Neurological: Negative for dizziness, tremors, seizures, syncope, facial asymmetry, speech difficulty, weakness, light-headedness, numbness and headaches.   Hematological: Negative for adenopathy. Does not bruise/bleed easily.   Psychiatric/Behavioral: Negative for agitation, behavioral problems, confusion, decreased concentration, dysphoric mood, hallucinations, self-injury, sleep disturbance and suicidal ideas. The patient is not nervous/anxious and is not hyperactive.          Current Outpatient Medications:   •  amLODIPine (NORVASC) 5 MG tablet, Take 0.5 tablets by mouth Daily. (Patient taking differently: Take 5 mg by mouth Daily.), Disp: 30 tablet, Rfl: 5  •  apixaban (ELIQUIS) 2.5 MG tablet tablet, Take 1 tablet by mouth Every 12 (Twelve) Hours. For PAF (new dose), Disp: 60 tablet, Rfl: 5  •  levothyroxine (UNITHROID) 75 MCG tablet, Take 1 tablet by mouth Daily. For thyroid, Disp: 90 tablet, Rfl: 1  •  PARoxetine (PAXIL) 10 MG tablet, TAKE 1 TABLET BY MOUTH EVERY MORNING FOR STRESS (Patient taking differently: Take 10 mg by mouth Every Morning.  For stress/states doesn't take it all the time as she should), Disp: 30 tablet, Rfl: 5  •  pravastatin (PRAVACHOL) 40 MG tablet, Take 1 tablet by mouth Daily. For cholesterol; new dose, Disp: 90 tablet, Rfl: 3  •  ALPRAZolam (XANAX) 0.25 MG tablet, Take 0.25 mg by mouth at night as needed for anxiety., Disp: , Rfl:       Imaging: I viewed MRI brain images, shows cortical scattered left MCA distribution strokes.  No hemorrhage.  CRANIAL CT SCAN WITHOUT CONTRAST     CLINICAL HISTORY: Intracranial hemorrhage; R47.1-Dysarthria and  anarthria; E78.2-Mixed hyperlipidemia.     COMPARISON: 12/30/2018.     TECHNIQUE: Radiation dose reduction techniques were utilized, including  automated exposure control and exposure modulation based on body size.  Multiple axial images of the head were obtained without contrast.      FINDINGS:  There is diffuse atrophy. Stable old appearing lacunar type  infarct in the right basal ganglia. There are mild scattered areas of  decreased density in the white matter likely related to chronic ischemic  gliotic changes. The patient's known small infarcts of the left cerebral  hemisphere are much better appreciated on previous MRI. These are not  well seen by noncontrast CT.  There is no hydrocephalus or midline  shift. Bone window images are unremarkable.        IMPRESSION:  No acute intracranial abnormality.                     This report was finalized on 1/7/2019 5:28 AM by John Rollins M.D.       MR SCAN OF THE BRAIN WITHOUT AND WITH INTRAVENOUS CONTRAST     HISTORY: Slurred speech. Right facial droop.     The MR scan was performed with sagittal, axial, and coronal images and  includes T1 images without and with intravenous contrast. There is  moderate diffuse atrophy and chronic small vessel ischemic change. The  diffusion sequence confirms the presence of multiple small acute  infarcts scattered in the left cerebral hemisphere and having a somewhat  watershed distribution and these measure  up to 1.9 cm in size. There is  no evidence of associated hemorrhage or abnormal enhancement or mass  effect at the present time.     CONCLUSION: Multiple small acute infarcts scattered in the left cerebral  hemisphere predominantly in a watershed type distribution in the left  frontal lobe and left temporoparietal and occipital regions. There is  currently no associated hemorrhage or mass effect.     MR ANGIOGRAPHY OF THE HEAD AND NECK WITHOUT AND WITH INTRAVENOUS  CONTRAST     MR angiography of the head and neck was performed without and with  intravenous contrast and demonstrates the followin. Evaluation for stenosis is based on NASCET criteria. The vertebral  arteries are patent with dominance of the left vertebral artery. Both  supply the basilar artery.     2. The cervical carotid arteries show no significant stenosis or  irregularity.     3. The intracranial MR angiography shows a segment of complete or nearly  complete occlusion of the left M1 segment over a length of 9 mm and  having a somewhat irregular contour suggesting the presence of acute  intra-arterial thrombus. There is filling of the M1 segment laterally  and subsequent filling of the M2 branches. No aneurysm is seen.     This report was finalized on 2018 8:56 PM by Dr. Varun Hernandez M.D.     Echocardiogram Findings TTE 18    Left Ventricle Left ventricular systolic function is normal. Calculated EF = 64.0%. Estimated EF = 64%. Normal left ventricular cavity size and wall thickness noted. All left ventricular wall segments contract normally. Left ventricular diastolic function is normal.   Right Ventricle Normal right ventricular cavity size, wall thickness, systolic function and septal motion noted.   Left Atrium Normal left atrial size and volume noted.   Right Atrium Normal right atrial size noted.   Aortic Valve The aortic valve is structurally normal. Trace aortic valve regurgitation is present. No aortic valve  stenosis is present. Peak velocity of the flow distal to the aortic valve is 129.0 cm/s.   Mitral Valve The mitral valve is normal in structure. No mitral valve regurgitation is present. No significant mitral valve stenosis is present.   Tricuspid Valve The tricuspid valve is normal. No tricuspid valve stenosis is present. No tricuspid valve regurgitation is present.   Pulmonic Valve The pulmonic valve is structurally normal. There is no significant pulmonic valve stenosis present. There is no pulmonic valve regurgitation present.   Greater Vessels No dilation of the aortic root is present. No dilation of the sinuses of Valsalva is present.   Pericardium   Monitor Procedure     Study Description Monitor placed on patient by CC on 1/3/2019 at 11:40 EST. The monitor was scanned on 1/23/2019. The patient was monitored for 13 days 22 hours. Indications for this exam include syncope. Average HR: 64. Min HR: 42. Max HR: 167.   Study Findings Patient diary was not submitted.No symptoms reported during the monitoring period. No complications noted. The predominant rhythm noted during the testing period was sinus rhythm. Premature atrial contractions occured rarely. There were 10 episodes of supraventricular tachycardia, the longest lasting 7 beats and the fastest with a rate of 167 bpm. Premature ventricular contractions occured rarely. There were no episodes of ventricular tachycardia. Sinoatrial node conduction was normal. No atrioventricular block noted.   Study Impressions A relatively benign monitor study.       Laboratory Results:             Lab Results   Component Value Date    HGBA1C 5.30 12/28/2018         Lab Results   Component Value Date    CHOL 161 12/28/2018         Lab Results   Component Value Date    HDL 57 12/28/2018    HDL 79 (H) 05/16/2018    HDL 79 (H) 10/06/2017         Lab Results   Component Value Date    LDL 80 12/28/2018    LDL 97 05/16/2018     (H) 10/06/2017         Lab Results    Component Value Date    TRIG 118 12/28/2018    TRIG 138 05/16/2018    TRIG 140 10/06/2017     No results found for: RPR  Lab Results   Component Value Date    TSH 3.690 12/28/2018     Lab Results   Component Value Date    SWGEEQCA99 507 12/28/2018        EKG 12/27/18 tracings viewed by me, showed normal sinus rhythm      Vitals:    02/13/19 1331   BP: 130/60   Pulse: 83   SpO2: 98%       Physical Examination: NIHSS: 0   General Appearance:   Well developed, well nourished, well groomed, alert, and cooperative.  HEENT: Normocephalic.  Normal fundoscopic exam including normal retina, discs are flat with sharp margins, normal vasculature.  Neck and Spine: Normal range of motion.  Normal alignment. No mass or tenderness. No bruits.  Cardiac: Regular rate and rhythm. No murmurs.  Peripheral Vasculature: Radial and pedal pulses are equal and symmetric. No     signs of distal embolization.  Extremities:    No edema or deformities. Normal joint ROM.  Skin:    No rashes or birth marks.    Neurological examination:  Higher Integrative  Function: Oriented to time, place and person. Normal registration, recall, attention span and concentration. Normal language including comprehension, spontaneous speech, repetition, reading, writing, naming and vocabulary. No neglect with normal visual-spatial function and construction. Normal fund of knowledge and higher integrative function.  CN II: Pupils are equal, round, and reactive to light. Normal visual acuity and visual fields.    CN III IV VI: Extraocular movements are full without nystagmus.   CN V: Normal facial sensation and strength of muscles of mastication.  CN VII: Facial movements are symmetric. No weakness.  CN VIII:   Auditory acuity is normal.  CN IX & X:   Symmetric palatal movement.  CN XI: Sternocleidomastoid and trapezius are normal.  No weakness.  CN XII:   The tongue is midline.  No atrophy or fasciculations.  Motor: Normal muscle strength, bulk and tone in upper  and lower extremities.  No fasciculations, rigidity, spasticity, or abnormal movements.  Reflexes: 2+ in the upper and lower extremities. Plantar responses are flexor.  Sensation: Normal to light touch, pinprick, vibration, temperature, and proprioception in arms and legs. Normal graphesthesia and no extinction on DSS.  Station and Gait: Normal gait and station.    Coordination: Finger to nose test shows no dysmetria.  Rapid alternating movements are normal.  Heel to shin normal.    Diagnoses / Discussion:  Mrs Ospina was seen in follow up today for L MCA stroke which was felt secondary to afib, although afib was not identified. She is on eliquis 2.5 mg BID.    Plan:   She can return to driving from a neurological standpoint.   Continue eliquis.    Blood pressure control to <130/80   Goal LDL <70-recommend high dose statins-    Serum glucose < 140   Call 911 for stroke any stroke symptoms   Follow-up  In 6 months, or sooner if needed.  Yolis was seen today for stroke.    Diagnoses and all orders for this visit:    Cerebrovascular accident (CVA) due to thrombosis of left middle cerebral artery (CMS/HCC)

## 2019-02-11 ENCOUNTER — OFFICE VISIT (OUTPATIENT)
Dept: FAMILY MEDICINE CLINIC | Facility: CLINIC | Age: 84
End: 2019-02-11

## 2019-02-11 VITALS
BODY MASS INDEX: 23.39 KG/M2 | WEIGHT: 132 LBS | DIASTOLIC BLOOD PRESSURE: 79 MMHG | SYSTOLIC BLOOD PRESSURE: 128 MMHG | TEMPERATURE: 97.7 F | HEIGHT: 63 IN | RESPIRATION RATE: 16 BRPM | HEART RATE: 59 BPM

## 2019-02-11 DIAGNOSIS — C64.1 MALIGNANT NEOPLASM OF RIGHT KIDNEY (HCC): ICD-10-CM

## 2019-02-11 DIAGNOSIS — I63.312 CEREBROVASCULAR ACCIDENT (CVA) DUE TO THROMBOSIS OF LEFT MIDDLE CEREBRAL ARTERY (HCC): ICD-10-CM

## 2019-02-11 DIAGNOSIS — Z86.79 HISTORY OF ATRIAL FIBRILLATION: ICD-10-CM

## 2019-02-11 DIAGNOSIS — E83.52 HYPERCALCEMIA: ICD-10-CM

## 2019-02-11 DIAGNOSIS — Z86.73 HISTORY OF STROKE: ICD-10-CM

## 2019-02-11 DIAGNOSIS — E21.0 PRIMARY HYPERPARATHYROIDISM (HCC): Primary | ICD-10-CM

## 2019-02-11 DIAGNOSIS — I10 ESSENTIAL HYPERTENSION: ICD-10-CM

## 2019-02-11 PROCEDURE — 99214 OFFICE O/P EST MOD 30 MIN: CPT | Performed by: FAMILY MEDICINE

## 2019-02-11 NOTE — PROGRESS NOTES
Subjective   Chief Complaint:   Chief Complaint   Patient presents with   • Cerebrovascular Accident         History of Present Illness she is here for hospital follow-up.  Home she is on Xanax 0.25 mg and amlodipine 5 mg and Eliquis 2.5 mg twice daily and levothyroxine 75 MCG's and Paxil 10 mg and Pravachol 40 mg.  Reviewed her hospital notes and the notes from  in the hospital and from Tonya Malcolm.  She needs to see Dr. Win about the atrial fib and she is on the Eliquis at 2.5 mg twice daily.  I still do not see the neurologist they referred her to and she has not seen a neurologist yet.  She needs to get in to see  heard him on a call his office today.  Normal sinus rhythm at present she is alert and oriented I do not see any neurological deficits on her and she has no headache but has had elevated calcium can repeat the calcium and PTH today and then I am going to review her records and eminences see which neurologist she is supposed to see for follow-up.  She is alert and oriented and has no headache.            Yolis Ospina 89 y.o. female.    ICD-10-CM ICD-9-CM   1. Cerebrovascular accident (CVA) due to thrombosis of left middle cerebral artery (CMS/HCC) I63.312 434.01   2. Essential hypertension I10 401.9   3. History of atrial fibrillation Z86.79 V12.59   4. History of stroke Z86.73 V12.54   5. Hypercalcemia E83.52 275.42   6. Primary hyperparathyroidism (CMS/HCC) E21.0 252.01   7. Malignant neoplasm of right kidney (CMS/HCC) C64.1 189.0        she has a problem list of   Patient Active Problem List   Diagnosis   • Fatigue   • Hypercalcemia   • Vitamin D deficiency   • Thyroid nodule   • Essential hypertension   • Hypothyroidism   • Hyperlipidemia   • Palpitation   • Anxiety   • Primary hyperparathyroidism (CMS/HCC)   • Dysarthria   • Cerebrovascular accident (CVA) due to thrombosis of left middle cerebral artery (CMS/HCC)   • History of atrial fibrillation   • Hospital discharge follow-up   •  "History of stroke   • Nocturnal hypoxemia   • Malignant neoplasm of right kidney (CMS/HCC)   .  Since the last visit, she has overall felt well.  she has been compliant with   Current Outpatient Medications:   •  ALPRAZolam (XANAX) 0.25 MG tablet, Take 0.25 mg by mouth at night as needed for anxiety., Disp: , Rfl:   •  amLODIPine (NORVASC) 5 MG tablet, Take 0.5 tablets by mouth Daily. (Patient taking differently: Take 5 mg by mouth Daily.), Disp: 30 tablet, Rfl: 5  •  apixaban (ELIQUIS) 2.5 MG tablet tablet, Take 1 tablet by mouth Every 12 (Twelve) Hours. For PAF (new dose), Disp: 60 tablet, Rfl: 5  •  levothyroxine (UNITHROID) 75 MCG tablet, Take 1 tablet by mouth Daily. For thyroid, Disp: 90 tablet, Rfl: 1  •  PARoxetine (PAXIL) 10 MG tablet, TAKE 1 TABLET BY MOUTH EVERY MORNING FOR STRESS (Patient taking differently: Take 10 mg by mouth Every Morning. For stress/states doesn't take it all the time as she should), Disp: 30 tablet, Rfl: 5  •  pravastatin (PRAVACHOL) 40 MG tablet, Take 1 tablet by mouth Daily. For cholesterol; new dose, Disp: 90 tablet, Rfl: 3.  she denies medication side effects.    All of the chronic condition(s) listed above are stable w/o issues.    /79 (BP Location: Left arm, Patient Position: Sitting, Cuff Size: Adult)   Pulse 59   Temp 97.7 °F (36.5 °C) (Oral)   Resp 16   Ht 160 cm (63\")   Wt 59.9 kg (132 lb)   LMP  (LMP Unknown)   BMI 23.38 kg/m²           The following portions of the patient's history were reviewed and updated as appropriate: allergies, current medications, past family history, past medical history, past social history, past surgical history and problem list.    Review of Systems   Constitutional: Negative for activity change and appetite change.   HENT: Negative for congestion and sinus pain.    Eyes: Negative for visual disturbance.   Respiratory: Negative for apnea, chest tightness and shortness of breath.    Cardiovascular: Negative for chest pain and " palpitations.   Gastrointestinal: Negative for abdominal distention and abdominal pain.   Genitourinary: Negative for dysuria.   Musculoskeletal: Negative for arthralgias and back pain.   Skin: Negative for rash.   Neurological: Negative for dizziness, syncope, numbness and headaches.   Psychiatric/Behavioral: Negative for behavioral problems. The patient is not nervous/anxious.        Objective   Physical Exam   Constitutional: She is oriented to person, place, and time. She appears well-developed and well-nourished.   HENT:   Head: Normocephalic.   Eyes: EOM are normal. Pupils are equal, round, and reactive to light.   Neck: Normal range of motion. No thyromegaly present.   Cardiovascular: Normal rate and regular rhythm.   No murmur heard.  Pulmonary/Chest: Effort normal and breath sounds normal.   Abdominal: Soft. Bowel sounds are normal.   Musculoskeletal: Normal range of motion. She exhibits no edema.   Lymphadenopathy:     She has no cervical adenopathy.   Neurological: She is alert and oriented to person, place, and time. No cranial nerve deficit or sensory deficit.   Skin: Skin is warm and dry.   Psychiatric: She has a normal mood and affect. Her behavior is normal.   Nursing note and vitals reviewed.      Assessment/Plan   Yolis was seen today for cerebrovascular accident.    Diagnoses and all orders for this visit:    Cerebrovascular accident (CVA) due to thrombosis of left middle cerebral artery (CMS/HCC)    Essential hypertension    History of atrial fibrillation    History of stroke    Hypercalcemia    Primary hyperparathyroidism (CMS/HCC)    Malignant neoplasm of right kidney (CMS/HCC)    Other orders  -     apixaban (ELIQUIS) 2.5 MG tablet tablet; Take 1 tablet by mouth Every 12 (Twelve) Hours. For PAF (new dose)

## 2019-02-12 LAB
BUN SERPL-MCNC: 14 MG/DL (ref 8–27)
BUN/CREAT SERPL: 18 (ref 12–28)
CALCIUM SERPL-MCNC: 10.9 MG/DL (ref 8.7–10.3)
CHLORIDE SERPL-SCNC: 108 MMOL/L (ref 96–106)
CO2 SERPL-SCNC: 22 MMOL/L (ref 20–29)
CREAT SERPL-MCNC: 0.79 MG/DL (ref 0.57–1)
GLUCOSE SERPL-MCNC: 90 MG/DL (ref 65–99)
INTACT PTH: ABNORMAL
POTASSIUM SERPL-SCNC: 4.7 MMOL/L (ref 3.5–5.2)
PTH-INTACT SERPL-MCNC: 80 PG/ML (ref 15–65)
SODIUM SERPL-SCNC: 143 MMOL/L (ref 134–144)

## 2019-02-13 ENCOUNTER — OFFICE VISIT (OUTPATIENT)
Dept: NEUROLOGY | Facility: CLINIC | Age: 84
End: 2019-02-13

## 2019-02-13 VITALS
HEIGHT: 63 IN | DIASTOLIC BLOOD PRESSURE: 60 MMHG | WEIGHT: 132 LBS | HEART RATE: 83 BPM | BODY MASS INDEX: 23.39 KG/M2 | SYSTOLIC BLOOD PRESSURE: 130 MMHG | OXYGEN SATURATION: 98 %

## 2019-02-13 DIAGNOSIS — I63.312 CEREBROVASCULAR ACCIDENT (CVA) DUE TO THROMBOSIS OF LEFT MIDDLE CEREBRAL ARTERY (HCC): Primary | ICD-10-CM

## 2019-02-13 PROCEDURE — 99214 OFFICE O/P EST MOD 30 MIN: CPT | Performed by: NURSE PRACTITIONER

## 2019-02-22 ENCOUNTER — APPOINTMENT (OUTPATIENT)
Dept: SLEEP MEDICINE | Facility: HOSPITAL | Age: 84
End: 2019-02-22

## 2019-02-27 ENCOUNTER — OFFICE VISIT (OUTPATIENT)
Dept: CARDIOLOGY | Facility: CLINIC | Age: 84
End: 2019-02-27

## 2019-02-27 ENCOUNTER — DOCUMENTATION (OUTPATIENT)
Dept: CARDIOLOGY | Facility: CLINIC | Age: 84
End: 2019-02-27

## 2019-02-27 VITALS
WEIGHT: 135.8 LBS | OXYGEN SATURATION: 95 % | DIASTOLIC BLOOD PRESSURE: 68 MMHG | BODY MASS INDEX: 24.06 KG/M2 | SYSTOLIC BLOOD PRESSURE: 124 MMHG | HEART RATE: 69 BPM | HEIGHT: 63 IN

## 2019-02-27 DIAGNOSIS — I10 ESSENTIAL HYPERTENSION: Primary | ICD-10-CM

## 2019-02-27 DIAGNOSIS — Z86.79 HISTORY OF ATRIAL FIBRILLATION: ICD-10-CM

## 2019-02-27 PROCEDURE — 99204 OFFICE O/P NEW MOD 45 MIN: CPT | Performed by: INTERNAL MEDICINE

## 2019-02-27 NOTE — PROGRESS NOTES
Subjective:     Encounter Date:02/27/2019      Patient ID: Yolis Ospina is a 89 y.o. female.    Chief Complaint:  Hypertension   This is a chronic problem. The current episode started more than 1 year ago. The problem is controlled.   Atrial Fibrillation   Presents for initial visit. The symptoms have been stable. Past medical history includes atrial fibrillation.     89-year-old female who presents today for evaluation.  Patient has a history of atrial fibrillation that dates back many many years.  I actually saw her back in 2011.  Patient was recently hospitalized after suffering a stroke.  Patient was out of the window for TPA so was treated traditionally.  Fortunately most of her symptoms have resolved.  Neurology felt like she had atrial fibrillation in the past.  She had a monitor placed and was subsequently discharged home.  Her monitor came back abnormal and she was seen today for evaluation.    Review of Systems   All other systems reviewed and are negative.      Procedures       Objective:     Physical Exam   Constitutional: She is oriented to person, place, and time. She appears well-developed.   HENT:   Head: Normocephalic.   Eyes: Conjunctivae are normal.   Neck: Normal range of motion.   Cardiovascular: Normal rate, regular rhythm and normal heart sounds.   Pulmonary/Chest: Breath sounds normal.   Abdominal: Soft. Bowel sounds are normal.   Musculoskeletal: Normal range of motion. She exhibits no edema.   Neurological: She is alert and oriented to person, place, and time.   Skin: Skin is warm and dry.   Psychiatric: She has a normal mood and affect. Her behavior is normal.   Vitals reviewed.      Lab Review:       Assessment:         No diagnosis found.       Plan:       1.  Paroxysmal atrial fibrillation.  Although this is documented in her notes I believe it was presumed.  In either case her monitor showed 10 episodes of an atrial arrhythmia that would be consistent with atrial fibrillation but  the longest it ever lasted was 7 beats.  In light of that technically her monitor was not consistent with atrial fibrillation but her overall history is.  I explained to the difference between atrial fibrillation in a setting with no stroke versus a elderly female with multiple risk factors and a documented stroke the benefits of anticoagulation.  I also explained to her that the older she is there is a higher risk of anticoagulation but the benefit is also significantly higher.  I do agree with current management I explained that very extensively to the patient and her family member.  I would see her back in 6 months.  I would also not add any medicines at the current time.    Atrial Fibrillation and Atrial Flutter  Assessment  • The patient has paroxysmal atrial fibrillation  • This is non-valvular in etiology  • The patient's CHADS2-VASc score is 6  • A [id number] score of 2 or more is considered a high risk for a thromboembolic event  • Apixaban prescribed    Plan  • Attempt to maintain sinus rhythm  • Continue apixaban for antithrombotic therapy, bleeding issues discussed

## 2019-03-30 DIAGNOSIS — F41.9 ANXIETY: ICD-10-CM

## 2019-04-01 RX ORDER — PAROXETINE 10 MG/1
10 TABLET, FILM COATED ORAL EVERY MORNING
Qty: 30 TABLET | Refills: 5 | Status: SHIPPED | OUTPATIENT
Start: 2019-04-01 | End: 2019-09-26 | Stop reason: SDUPTHER

## 2019-04-15 DIAGNOSIS — I10 ESSENTIAL HYPERTENSION: ICD-10-CM

## 2019-04-15 RX ORDER — AMLODIPINE BESYLATE 2.5 MG/1
2.5 TABLET ORAL DAILY
Qty: 90 TABLET | Refills: 1 | Status: SHIPPED | OUTPATIENT
Start: 2019-04-15 | End: 2020-09-16 | Stop reason: SDUPTHER

## 2019-04-30 ENCOUNTER — TELEPHONE (OUTPATIENT)
Dept: FAMILY MEDICINE CLINIC | Facility: CLINIC | Age: 84
End: 2019-04-30

## 2019-05-01 NOTE — TELEPHONE ENCOUNTER
I called patient and discussed her Paxil 10 mg.  She is not taking Paxil daily.  She had Paxil 10 mg at home and still had a prescription with 5 refills.  Is not taking Paxil every day and she was even taking Paxil half a tablet as needed.  She is only taking this as needed and I instructed her she can take Paxil 10 mg every day or she could take a half every day and she was satisfied with that and she did not need any other medications.

## 2019-05-07 ENCOUNTER — TELEPHONE (OUTPATIENT)
Dept: CARDIOLOGY | Facility: CLINIC | Age: 84
End: 2019-05-07

## 2019-05-07 NOTE — TELEPHONE ENCOUNTER
Sometimes it can be linked I would tell her to stop her Eliquis for 2 days see if it improves resume it and see if it comes back

## 2019-05-07 NOTE — TELEPHONE ENCOUNTER
The patient called and is taking Eliquis and she has been having Dizzy spells and wants to know if the two are linked together.    Pt # [phone]    Thank you  Zora

## 2019-05-20 ENCOUNTER — TELEPHONE (OUTPATIENT)
Dept: NEUROLOGY | Facility: CLINIC | Age: 84
End: 2019-05-20

## 2019-05-20 NOTE — TELEPHONE ENCOUNTER
----- Message from Kasandra Garcia sent at 5/20/2019  9:12 AM EDT -----  Contact: [phone]    ----- Message -----  From: Kasandra Garcia  Sent: 5/20/2019   9:06 AM  To: Tracy Juarez MA    Patient is having a pain in her head?? She is not sure it is anything but wondering if she needs a scan

## 2019-05-21 NOTE — TELEPHONE ENCOUNTER
Spoke with the patient.   She states her headaches are very mild and are not different to when she had seen Jesi Crawford in February. She will see Jesi in August, and understands to call if she has any other concerns

## 2019-08-20 ENCOUNTER — OFFICE VISIT (OUTPATIENT)
Dept: FAMILY MEDICINE CLINIC | Facility: CLINIC | Age: 84
End: 2019-08-20

## 2019-08-20 VITALS
HEIGHT: 63 IN | OXYGEN SATURATION: 99 % | RESPIRATION RATE: 16 BRPM | DIASTOLIC BLOOD PRESSURE: 68 MMHG | SYSTOLIC BLOOD PRESSURE: 146 MMHG | BODY MASS INDEX: 23.57 KG/M2 | WEIGHT: 133 LBS | HEART RATE: 51 BPM | TEMPERATURE: 98.5 F

## 2019-08-20 DIAGNOSIS — E21.0 PRIMARY HYPERPARATHYROIDISM (HCC): ICD-10-CM

## 2019-08-20 DIAGNOSIS — E78.2 MIXED HYPERLIPIDEMIA: ICD-10-CM

## 2019-08-20 DIAGNOSIS — E03.9 ACQUIRED HYPOTHYROIDISM: Primary | ICD-10-CM

## 2019-08-20 PROCEDURE — 99213 OFFICE O/P EST LOW 20 MIN: CPT | Performed by: FAMILY MEDICINE

## 2019-08-20 RX ORDER — PRAVASTATIN SODIUM 40 MG
40 TABLET ORAL DAILY
Qty: 90 TABLET | Refills: 3 | Status: SHIPPED | OUTPATIENT
Start: 2019-08-20 | End: 2020-03-03 | Stop reason: SDUPTHER

## 2019-08-20 RX ORDER — LEVOTHYROXINE SODIUM 0.07 MG/1
75 TABLET ORAL DAILY
Qty: 90 TABLET | Refills: 1 | Status: CANCELLED | OUTPATIENT
Start: 2019-08-20

## 2019-08-20 RX ORDER — ALPRAZOLAM 0.25 MG/1
0.25 TABLET ORAL NIGHTLY PRN
Qty: 30 TABLET | Refills: 0 | Status: SHIPPED | OUTPATIENT
Start: 2019-08-20 | End: 2020-08-20

## 2019-08-20 NOTE — PROGRESS NOTES
Subjective   Chief Complaint:   Chief Complaint   Patient presents with   • Hyperlipidemia   • Hypothyroidism         History of Present Illness I sent her to see Dr. Rivera with abnormal labs dealing with a PTH and her calcium but she went saw him and did not want to go back and him.  Labs she had a elevated PTH and elevated calcium.  I refilled medicines that she needed I gave her a 30-day supply of Xanax she is good to take a half p.o. daily as needed anxiety and I am in order labs on her today and she is going to go get the labs now.  Order a CMP profile and a PTH and a calcium.            Yolis Ospina 89 y.o. female who presents today for Medical Management of the below listed issues and medication refills.    ICD-10-CM ICD-9-CM   1. Acquired hypothyroidism E03.9 244.9   2. Primary hyperparathyroidism (CMS/HCC) E21.0 252.01   3. Mixed hyperlipidemia E78.2 272.2        she has a problem list of   Patient Active Problem List   Diagnosis   • Fatigue   • Hypercalcemia   • Vitamin D deficiency   • Thyroid nodule   • Essential hypertension   • Hypothyroidism   • Hyperlipidemia   • Palpitation   • Anxiety   • Primary hyperparathyroidism (CMS/HCC)   • Dysarthria   • Cerebrovascular accident (CVA) due to thrombosis of left middle cerebral artery (CMS/HCC)   • History of atrial fibrillation   • Hospital discharge follow-up   • History of stroke   • Nocturnal hypoxemia   • Malignant neoplasm of right kidney (CMS/HCC)   .  Since the last visit, she has overall felt well.  she has been compliant with   Current Outpatient Medications:   •  ALPRAZolam (XANAX) 0.25 MG tablet, Take 1 tablet by mouth At Night As Needed for Anxiety., Disp: 30 tablet, Rfl: 0  •  amLODIPine (NORVASC) 2.5 MG tablet, Take 1 tablet by mouth Daily., Disp: 90 tablet, Rfl: 1  •  apixaban (ELIQUIS) 2.5 MG tablet tablet, Take 1 tablet by mouth Every 12 (Twelve) Hours. For PAF (new dose), Disp: 180 tablet, Rfl: 1  •  levothyroxine (UNITHROID) 75 MCG tablet,  "Take 1 tablet by mouth Daily. For thyroid, Disp: 90 tablet, Rfl: 1  •  PARoxetine (PAXIL) 10 MG tablet, TAKE 1 TABLET BY MOUTH EVERY MORNING FOR STRESS., Disp: 30 tablet, Rfl: 5  •  pravastatin (PRAVACHOL) 40 MG tablet, Take 1 tablet by mouth Daily. For cholesterol; new dose, Disp: 90 tablet, Rfl: 3.  she denies medication side effects.    All of the chronic condition(s) listed above are stable w/o issues.    /68   Pulse 51   Temp 98.5 °F (36.9 °C)   Resp 16   Ht 160 cm (63\")   Wt 60.3 kg (133 lb)   LMP  (LMP Unknown)   SpO2 99%   BMI 23.56 kg/m²     Results for orders placed or performed in visit on 08/20/19   Comprehensive metabolic panel   Result Value Ref Range    Glucose 91 65 - 99 mg/dL    BUN 12 8 - 23 mg/dL    Creatinine 0.78 0.57 - 1.00 mg/dL    eGFR Non African Am 70 >60 mL/min/1.73    eGFR African Am 84 >60 mL/min/1.73    BUN/Creatinine Ratio 15.4 7.0 - 25.0    Sodium 142 136 - 145 mmol/L    Potassium 4.6 3.5 - 5.2 mmol/L    Chloride 108 (H) 98 - 107 mmol/L    Total CO2 24.1 22.0 - 29.0 mmol/L    Calcium 10.6 (H) 8.6 - 10.5 mg/dL    Total Protein 7.1 6.0 - 8.5 g/dL    Albumin 4.40 3.50 - 5.20 g/dL    Globulin 2.7 gm/dL    A/G Ratio 1.6 g/dL    Total Bilirubin 0.6 0.2 - 1.2 mg/dL    Alkaline Phosphatase 115 39 - 117 U/L    AST (SGOT) 19 1 - 32 U/L    ALT (SGPT) 15 1 - 33 U/L             The following portions of the patient's history were reviewed and updated as appropriate: allergies, current medications, past family history, past medical history, past social history, past surgical history and problem list.    Review of Systems   Constitutional: Negative for activity change, appetite change and unexpected weight change.   Eyes: Negative for visual disturbance.   Respiratory: Negative for chest tightness and shortness of breath.    Cardiovascular: Negative for chest pain and palpitations.   Skin: Negative for color change.   Neurological: Negative for syncope and speech difficulty. "   Psychiatric/Behavioral: Negative for confusion and decreased concentration.       Objective   Physical Exam   Constitutional: She is oriented to person, place, and time. She appears well-developed and well-nourished.   HENT:   Right Ear: External ear normal.   Left Ear: External ear normal.   Mouth/Throat: Oropharynx is clear and moist.   Eyes: Pupils are equal, round, and reactive to light.   Cardiovascular: Normal rate and regular rhythm.   Pulmonary/Chest: Effort normal and breath sounds normal.   Abdominal: Soft. Bowel sounds are normal.   Neurological: She is alert and oriented to person, place, and time.   Psychiatric: She has a normal mood and affect. Her behavior is normal.   Nursing note and vitals reviewed.      Assessment/Plan   Yolis was seen today for hyperlipidemia and hypothyroidism.    Diagnoses and all orders for this visit:    Acquired hypothyroidism  -     Comprehensive metabolic panel  -     Lipid Panel; Future  -     TSH; Future    Primary hyperparathyroidism (CMS/HCC)  -     Comprehensive metabolic panel  -     PTH, Intact & Calcium  -     Ambulatory Referral to Endocrinology    Mixed hyperlipidemia  -     Comprehensive metabolic panel  -     Lipid Panel; Future  -     TSH; Future    Other orders  -     Cancel: levothyroxine (UNITHROID) 75 MCG tablet; Take 1 tablet by mouth Daily. For thyroid  -     pravastatin (PRAVACHOL) 40 MG tablet; Take 1 tablet by mouth Daily. For cholesterol; new dose  -     ALPRAZolam (XANAX) 0.25 MG tablet; Take 1 tablet by mouth At Night As Needed for Anxiety.

## 2019-08-21 LAB
ALBUMIN SERPL-MCNC: 4.4 G/DL (ref 3.5–5.2)
ALBUMIN/GLOB SERPL: 1.6 G/DL
ALP SERPL-CCNC: 115 U/L (ref 39–117)
ALT SERPL-CCNC: 15 U/L (ref 1–33)
AST SERPL-CCNC: 19 U/L (ref 1–32)
BILIRUB SERPL-MCNC: 0.6 MG/DL (ref 0.2–1.2)
BUN SERPL-MCNC: 12 MG/DL (ref 8–23)
BUN/CREAT SERPL: 15.4 (ref 7–25)
CALCIUM SERPL-MCNC: 10.6 MG/DL (ref 8.6–10.5)
CHLORIDE SERPL-SCNC: 108 MMOL/L (ref 98–107)
CO2 SERPL-SCNC: 24.1 MMOL/L (ref 22–29)
CREAT SERPL-MCNC: 0.78 MG/DL (ref 0.57–1)
GLOBULIN SER CALC-MCNC: 2.7 GM/DL
GLUCOSE SERPL-MCNC: 91 MG/DL (ref 65–99)
POTASSIUM SERPL-SCNC: 4.6 MMOL/L (ref 3.5–5.2)
PROT SERPL-MCNC: 7.1 G/DL (ref 6–8.5)
SODIUM SERPL-SCNC: 142 MMOL/L (ref 136–145)

## 2019-08-26 ENCOUNTER — TELEPHONE (OUTPATIENT)
Dept: FAMILY MEDICINE CLINIC | Facility: CLINIC | Age: 84
End: 2019-08-26

## 2019-08-26 ENCOUNTER — DOCUMENTATION (OUTPATIENT)
Dept: FAMILY MEDICINE CLINIC | Facility: CLINIC | Age: 84
End: 2019-08-26

## 2019-08-27 NOTE — PROGRESS NOTES
Called patient, updated on lab results and need for referral and need for additional labs.  She doesn't want to referral or the labs.

## 2019-08-27 NOTE — TELEPHONE ENCOUNTER
Her calcium was a little high again. She needs to get additional labs to assess this. I put in a referral to Dr Munguia if she doesn't want to see Dr Rivera, but she does need to be seen by an endocrinologist.

## 2019-08-28 NOTE — TELEPHONE ENCOUNTER
Spoke to pt - pt states that Dr. Robles already called her with lab results.  Pt states that she does not want to go to [facility], she is almost 89 years old and doesn't drive.

## 2019-09-06 ENCOUNTER — TELEPHONE (OUTPATIENT)
Dept: NEUROLOGY | Facility: CLINIC | Age: 84
End: 2019-09-06

## 2019-09-06 NOTE — TELEPHONE ENCOUNTER
Patient denies any jaw pain, vision changes, tiredness or any other new neurologic changes.  I offered to move patient's appt up but, she would not stating she would need to call her ride and see what dates she would be available.  Pt sts she will call back.

## 2019-09-06 NOTE — TELEPHONE ENCOUNTER
----- Message from ALIRIO Stubbs sent at 9/6/2019  3:35 PM EDT -----  Regarding: RE: Right side of head tender to the touch  Contact: [phone]  Please let her know if she has vision change, jaw pain or tiredness with chewing, or any other new neurologic symptoms she should go to the ER. Otherwise, we could try to get her in sooner with me or someone else.   ----- Message -----  From: Tracy Juarez MA  Sent: 9/6/2019  10:38 AM  To: ALIRIO Stubbs  Subject: Right side of head tender to the touch           Right side of head sore to the touch for weeks.  She isn't sure how long.  Pt denies slurred speech, weakness, numbness and facial droop.  Just wants to know why her head would be sore to the touch.  Denies head trauma.  Pt has cancelled the last two appts with you due to transportation.  She is scheduled in October but, didn't want to wait that long to ask you about this pain.      Thank you

## 2019-09-26 DIAGNOSIS — F41.9 ANXIETY: ICD-10-CM

## 2019-09-27 RX ORDER — PAROXETINE 10 MG/1
10 TABLET, FILM COATED ORAL EVERY MORNING
Qty: 30 TABLET | Refills: 5 | Status: SHIPPED | OUTPATIENT
Start: 2019-09-27 | End: 2020-03-27

## 2019-10-04 ENCOUNTER — TELEPHONE (OUTPATIENT)
Dept: FAMILY MEDICINE CLINIC | Facility: CLINIC | Age: 84
End: 2019-10-04

## 2019-10-04 NOTE — TELEPHONE ENCOUNTER
Pt called stating that she received letter that labs are due.  Called pt and explained that Dr. Robles had ordered some labs in Aug.  Pt states that she does not see the need and chooses not to repeat labs this soon.

## 2019-10-04 NOTE — PROGRESS NOTES
DOS: 10/15/2019  NAME: Yolis Ospina   : 10/25/1929  PCP: Morales Bauer MD       Chief complaint: Stroke follow-up    Neurological Problem and Interval History:  89 y.o. right-handed female with a PMH of HTN, HLP, history of breast cancer status post mastectomy/radiation , kidney cancer status post nephrectomy , thyroid dysfunction, and vitamin D deficiency who is being seen in follow-up today for stroke.  She is accompanied by her daughter.  Patient presented to [facility] in 2018 with word finding difficulty.  He was not on antiplatelet or anticoagulation however she was on Pravachol prior to her stroke.  MRI of the brain showed multiple small acute cortical infarct scattered in the left cerebral hemisphere.  MRA head neck showed complete or nearly complete occlusion of the left M1 segment with somewhat irregular contour suggesting the presence of acute intra-arterial thrombus.  Dr. Ling felt the patient most likely had undiagnosed paroxysmal atrial fibrillation and started the patient heparin drip.  TTE showed EF of 64% with normal left atrial size and volume.  Dr. Ling ultimately decided to empirically place the patient on Eliquis 5 mg twice daily and antiplatelet was stopped.  She was discharged on increased dose of pravastatin 40 mg daily.  Outpatient ZIO Patch showed some episodes of SVT and rare PVCs but no atrial fibrillation.  Eliquis was later decreased to 2.5 mg BID by her PCP.    Patient complains of loss of balance and dizziness.  She states that she has had poor balance for many years but that has gotten worse since her stroke.  She notes being unstable and people having to hold onto her when she is walking at [facility].  She denies any numbness, tingling, or pain in feet or hands.  She also notes that when she stands she sometimes becomes lightheaded but this does not occur every day.  She has had one fall recently which she was standing on a porch and was holding  onto something but let go to turn around and lost her footing and fell but was not hurt.  In December 2018 her B12 level was 507.    She also complains of right scalp soreness that is intermittent.  It may occur every other day and last an hour or two.  The pain is not severe and she typically notices it when she is fixing her hair.  Although she calls it a headache she denies that it is actually a pain on the inside of her head.    She denies any recent TIA or stroke symptoms.  She lives alone and drives rarely.      Review of Systems:        Review of Systems   Constitutional: Negative for activity change, appetite change, chills, diaphoresis, fatigue, fever and unexpected weight change.   HENT: Negative for congestion, dental problem, drooling, facial swelling, sneezing, sore throat, trouble swallowing and voice change.    Eyes: Negative for photophobia, discharge and visual disturbance.   Respiratory: Negative for cough, chest tightness, shortness of breath and wheezing.    Cardiovascular: Positive for leg swelling. Negative for chest pain and palpitations.   Gastrointestinal: Negative for abdominal distention, abdominal pain and vomiting.   Endocrine: Positive for cold intolerance. Negative for heat intolerance.   Genitourinary: Negative for difficulty urinating, flank pain and pelvic pain.   Musculoskeletal: Positive for arthralgias, back pain and gait problem.   Skin: Negative for color change, pallor, rash and wound.   Allergic/Immunologic: Negative for environmental allergies, food allergies and immunocompromised state.   Neurological: Positive for dizziness, facial asymmetry, light-headedness and headaches. Negative for syncope, speech difficulty, weakness and numbness.   Hematological: Bruises/bleeds easily.   Psychiatric/Behavioral: Positive for agitation and sleep disturbance. Negative for confusion and hallucinations. The patient is nervous/anxious.          Current Outpatient Medications:   •   ALPRAZolam (XANAX) 0.25 MG tablet, Take 1 tablet by mouth At Night As Needed for Anxiety., Disp: 30 tablet, Rfl: 0  •  amLODIPine (NORVASC) 2.5 MG tablet, Take 1 tablet by mouth Daily. (Patient taking differently: Take 2.5 mg by mouth Daily. .5 daily), Disp: 90 tablet, Rfl: 1  •  apixaban (ELIQUIS) 2.5 MG tablet tablet, Take 1 tablet by mouth Every 12 (Twelve) Hours. For PAF (new dose), Disp: 180 tablet, Rfl: 1  •  levothyroxine (UNITHROID) 75 MCG tablet, Take 1 tablet by mouth Daily. For thyroid, Disp: 90 tablet, Rfl: 1  •  PARoxetine (PAXIL) 10 MG tablet, TAKE 1 TABLET BY MOUTH EVERY MORNING FOR STRESS., Disp: 30 tablet, Rfl: 5  •  pravastatin (PRAVACHOL) 40 MG tablet, Take 1 tablet by mouth Daily. For cholesterol; new dose, Disp: 90 tablet, Rfl: 3    I did not stop or change the above medications.  Patient's medication list was updated to reflect medications they have reported as currently taking, including medication changes made by other providers.          Laboratory Results:             Lab Results   Component Value Date    HGBA1C 5.30 12/28/2018         Lab Results   Component Value Date    CHOL 161 12/28/2018         Lab Results   Component Value Date    HDL 57 12/28/2018    HDL 79 (H) 05/16/2018    HDL 79 (H) 10/06/2017         Lab Results   Component Value Date    LDL 80 12/28/2018    LDL 97 05/16/2018     (H) 10/06/2017         Lab Results   Component Value Date    TRIG 118 12/28/2018    TRIG 138 05/16/2018    TRIG 140 10/06/2017     No results found for: RPR  Lab Results   Component Value Date    TSH 3.690 12/28/2018     Lab Results   Component Value Date    NLYVLRQC63 507 12/28/2018     Vitals:    10/15/19 1049   BP: 150/78   Pulse: 60   SpO2: 99%     Sitting /84  Standing /82     Physical Examination:   General Appearance:   Well developed, well nourished, well groomed, alert, and cooperative.  HEENT: Normocephalic. Scalp nontender to palpation.  Neck and Spine: Normal range of  motion.  Normal alignment. No mass or tenderness.   Cardiac: Regular rate and rhythm. No murmurs.  Peripheral Vasculature: Radial and pedal pulses are equal and symmetric.   Extremities:    Trace edema BLE.  Skin:    No rashes or birth marks.    Neurological examination:  Higher Integrative  Function: Oriented to time, place and person. Normal attention span and concentration. Normal language including comprehension, spontaneous speech, repetition, reading, writing, naming and vocabulary. No neglect. Normal fund of knowledge and higher integrative function.  CN II: Pupils are equal, round, and reactive to light. Normal visual fields.    CN III IV VI: Extraocular movements are full without nystagmus.   CN V: Normal facial sensation .  CN VII: Facial movements are symmetric.   CN VIII:   Auditory acuity is normal.  CN IX & X:   Symmetric palatal movement.  CN XI: Sternocleidomastoid and trapezius are normal.  No weakness.  CN XII:   The tongue is midline.  No atrophy or fasciculations.  Motor: Normal muscle strength, bulk and tone in upper and lower extremities except bilateral ABP weakness.  No fasciculations, rigidity, spasticity, or abnormal movements.  Reflexes: 2+ in the upper and lower extremities. Plantar responses are flexor.  Sensation: Normal to light touch, pinprick, vibration, temperature, and proprioception in arms and legs.   Station and Gait: Normal gait and station. Mild difficulty with heel walking. Able to toe walk. Unable to tandem walk due to loss of balance.   Coordination: Finger to nose test shows no dysmetria.  Rapid alternating movements are normal.  Heel to shin normal.    Diagnoses / Discussion:  Ms Ospina was seen today for stroke which was felt cardioembolic. She is doing well on eliquis 2.5 mg BID, aside from recent cost increase. She complains of poor balance without s/sx of neuropathy.    Plan:   Referral to PT for gait/balance   Continue eliquis.   Blood pressure control to  <130/80   Goal LDL <70-continue Pravachol 40 mg    Serum glucose < 140   Call 911 for stroke any stroke symptoms   Follow-up in 1 year or sooner if needed.      Coding

## 2019-10-15 ENCOUNTER — OFFICE VISIT (OUTPATIENT)
Dept: NEUROLOGY | Facility: CLINIC | Age: 84
End: 2019-10-15

## 2019-10-15 VITALS
BODY MASS INDEX: 23.39 KG/M2 | HEART RATE: 60 BPM | HEIGHT: 63 IN | DIASTOLIC BLOOD PRESSURE: 78 MMHG | OXYGEN SATURATION: 99 % | WEIGHT: 132 LBS | SYSTOLIC BLOOD PRESSURE: 150 MMHG

## 2019-10-15 DIAGNOSIS — I63.312 CEREBROVASCULAR ACCIDENT (CVA) DUE TO THROMBOSIS OF LEFT MIDDLE CEREBRAL ARTERY (HCC): Primary | ICD-10-CM

## 2019-10-15 DIAGNOSIS — I69.398 GAIT DISTURBANCE, POST-STROKE: ICD-10-CM

## 2019-10-15 DIAGNOSIS — R26.9 GAIT DISTURBANCE, POST-STROKE: ICD-10-CM

## 2019-10-15 PROCEDURE — 99214 OFFICE O/P EST MOD 30 MIN: CPT | Performed by: NURSE PRACTITIONER

## 2019-10-15 NOTE — PATIENT INSTRUCTIONS
· Call me if head pain worse or different  · Call 911 for stroke symptoms- one sided weakness/numbness, severe headache, speech change, slurred speech, vision change, all of a sudden severe dizziness  ·  Physical therapy for balance difficulty, someone will call to schedule  · Ask Dr Viveros about BP and amlodipine

## 2019-11-04 ENCOUNTER — TELEPHONE (OUTPATIENT)
Dept: FAMILY MEDICINE CLINIC | Facility: CLINIC | Age: 84
End: 2019-11-04

## 2019-11-04 NOTE — TELEPHONE ENCOUNTER
"Pt called and left Vm asking for Rx for \"nerves\".  Called pt back - no answer - no Vm.  Pt was given Paxil 10 mg 09/27/19 #30 with 5 refills.    "

## 2019-11-15 RX ORDER — APIXABAN 2.5 MG/1
TABLET, FILM COATED ORAL
Qty: 60 TABLET | Refills: 2 | Status: SHIPPED | OUTPATIENT
Start: 2019-11-15 | End: 2019-11-19 | Stop reason: SDUPTHER

## 2019-11-19 ENCOUNTER — TELEPHONE (OUTPATIENT)
Dept: NEUROLOGY | Facility: CLINIC | Age: 84
End: 2019-11-19

## 2019-11-19 NOTE — TELEPHONE ENCOUNTER
Pt called asking that we send rx for Eliquis to [facility].  Pt sts she filled out the forms for patient assistance, but in the meantime, would like us to send a rx over.  I asked pt to come in for samples, but she said she couldn't get a ride.  Will you refill? Eliquis 2.5 mg bid

## 2019-12-04 ENCOUNTER — TELEPHONE (OUTPATIENT)
Dept: FAMILY MEDICINE CLINIC | Facility: CLINIC | Age: 84
End: 2019-12-04

## 2019-12-04 DIAGNOSIS — Z86.73 HISTORY OF STROKE: Primary | ICD-10-CM

## 2020-01-02 ENCOUNTER — RESULTS ENCOUNTER (OUTPATIENT)
Dept: FAMILY MEDICINE CLINIC | Facility: CLINIC | Age: 85
End: 2020-01-02

## 2020-01-02 DIAGNOSIS — E78.2 MIXED HYPERLIPIDEMIA: ICD-10-CM

## 2020-01-02 DIAGNOSIS — E03.9 ACQUIRED HYPOTHYROIDISM: ICD-10-CM

## 2020-01-21 ENCOUNTER — TELEPHONE (OUTPATIENT)
Dept: FAMILY MEDICINE CLINIC | Facility: CLINIC | Age: 85
End: 2020-01-21

## 2020-01-21 DIAGNOSIS — F41.9 ANXIETY: Primary | ICD-10-CM

## 2020-01-21 NOTE — TELEPHONE ENCOUNTER
"Patient isn't wanting Xanax all the time as she feels like a person \"could get hooked on\".  She would like a \"nerve pill\" that isn't like Xanax.  She requested it be sent to the [facility] that is on file.  "

## 2020-01-24 RX ORDER — BUSPIRONE HYDROCHLORIDE 5 MG/1
5 TABLET ORAL DAILY
Qty: 30 TABLET | Refills: 0 | Status: SHIPPED | OUTPATIENT
Start: 2020-01-24 | End: 2020-03-30

## 2020-02-27 DIAGNOSIS — F41.9 ANXIETY: ICD-10-CM

## 2020-02-27 RX ORDER — PRAVASTATIN SODIUM 40 MG
TABLET ORAL
Qty: 90 TABLET | Refills: 3 | OUTPATIENT
Start: 2020-02-27

## 2020-02-27 RX ORDER — LEVOTHYROXINE SODIUM 0.07 MG/1
TABLET ORAL
Qty: 30 TABLET | OUTPATIENT
Start: 2020-02-27

## 2020-02-28 RX ORDER — BUSPIRONE HYDROCHLORIDE 5 MG/1
5 TABLET ORAL DAILY
Qty: 30 TABLET | Refills: 0 | OUTPATIENT
Start: 2020-02-28

## 2020-03-03 RX ORDER — PRAVASTATIN SODIUM 40 MG
40 TABLET ORAL DAILY
Qty: 90 TABLET | Refills: 3 | Status: CANCELLED | OUTPATIENT
Start: 2020-03-03

## 2020-03-03 RX ORDER — PRAVASTATIN SODIUM 40 MG
40 TABLET ORAL DAILY
Qty: 30 TABLET | Refills: 0 | Status: SHIPPED | OUTPATIENT
Start: 2020-03-03 | End: 2020-09-16 | Stop reason: SDUPTHER

## 2020-03-03 RX ORDER — LEVOTHYROXINE SODIUM 0.07 MG/1
75 TABLET ORAL DAILY
Qty: 30 TABLET | Refills: 0 | Status: SHIPPED | OUTPATIENT
Start: 2020-03-03 | End: 2020-08-20

## 2020-03-03 RX ORDER — LEVOTHYROXINE SODIUM 0.07 MG/1
75 TABLET ORAL DAILY
Qty: 90 TABLET | Refills: 1 | Status: CANCELLED | OUTPATIENT
Start: 2020-03-03

## 2020-03-27 DIAGNOSIS — F41.9 ANXIETY: ICD-10-CM

## 2020-03-27 RX ORDER — PAROXETINE 10 MG/1
10 TABLET, FILM COATED ORAL EVERY MORNING
Qty: 30 TABLET | Refills: 2 | Status: SHIPPED | OUTPATIENT
Start: 2020-03-27 | End: 2020-09-16 | Stop reason: SDUPTHER

## 2020-03-28 DIAGNOSIS — F41.9 ANXIETY: ICD-10-CM

## 2020-03-30 RX ORDER — BUSPIRONE HYDROCHLORIDE 5 MG/1
5 TABLET ORAL DAILY
Qty: 30 TABLET | Refills: 0 | Status: SHIPPED | OUTPATIENT
Start: 2020-03-30 | End: 2020-09-16 | Stop reason: SDUPTHER

## 2020-06-05 ENCOUNTER — TELEPHONE (OUTPATIENT)
Dept: NEUROLOGY | Facility: CLINIC | Age: 85
End: 2020-06-05

## 2020-06-05 NOTE — TELEPHONE ENCOUNTER
Instructed pt to call either her PCP or Cardiologist today. Pt denied SOB or palpitations. Pt agreed to call once hanging up with me.

## 2020-06-05 NOTE — TELEPHONE ENCOUNTER
PT CALLED STATING FOR THE PAST 3-4 DAYS  HER LEG HAS BEEN SWELLING , NOT HER TOES BUT JUST THE BACK OF HER LEG SHE IS WEARING A ELASTIC STOCKING ON THAT HAS BEEN HELPING SHE WOULD LIKE A CALL BACK [phone]

## 2020-06-28 ENCOUNTER — APPOINTMENT (OUTPATIENT)
Dept: GENERAL RADIOLOGY | Facility: HOSPITAL | Age: 85
End: 2020-06-28

## 2020-06-28 ENCOUNTER — APPOINTMENT (OUTPATIENT)
Dept: CT IMAGING | Facility: HOSPITAL | Age: 85
End: 2020-06-28

## 2020-06-28 ENCOUNTER — HOSPITAL ENCOUNTER (EMERGENCY)
Facility: HOSPITAL | Age: 85
Discharge: HOME | End: 2020-06-29
Attending: EMERGENCY MEDICINE | Admitting: EMERGENCY MEDICINE

## 2020-06-28 DIAGNOSIS — R41.0 TRANSIENT CONFUSION: Primary | ICD-10-CM

## 2020-06-28 LAB
ALBUMIN SERPL-MCNC: 3.7 G/DL (ref 3.5–5.2)
ALBUMIN/GLOB SERPL: 1.3 G/DL
ALP SERPL-CCNC: 87 U/L (ref 39–117)
ALT SERPL W P-5'-P-CCNC: 18 U/L (ref 1–33)
ANION GAP SERPL CALCULATED.3IONS-SCNC: 8.5 MMOL/L (ref 5–15)
AST SERPL-CCNC: 22 U/L (ref 1–32)
BASOPHILS # BLD AUTO: 0.03 10*3/MM3 (ref 0–0.2)
BASOPHILS NFR BLD AUTO: 0.6 % (ref 0–1.5)
BILIRUB SERPL-MCNC: 0.3 MG/DL (ref 0.2–1.2)
BILIRUB UR QL STRIP: NEGATIVE
BUN BLD-MCNC: 17 MG/DL (ref 8–23)
BUN/CREAT SERPL: 18.5 (ref 7–25)
CALCIUM SPEC-SCNC: 10.6 MG/DL (ref 8.2–9.6)
CHLORIDE SERPL-SCNC: 107 MMOL/L (ref 98–107)
CLARITY UR: CLEAR
CO2 SERPL-SCNC: 22.5 MMOL/L (ref 22–29)
COLOR UR: YELLOW
CREAT BLD-MCNC: 0.92 MG/DL (ref 0.57–1)
DEPRECATED RDW RBC AUTO: 43.9 FL (ref 37–54)
EOSINOPHIL # BLD AUTO: 0.11 10*3/MM3 (ref 0–0.4)
EOSINOPHIL NFR BLD AUTO: 2.1 % (ref 0.3–6.2)
ERYTHROCYTE [DISTWIDTH] IN BLOOD BY AUTOMATED COUNT: 13 % (ref 12.3–15.4)
GFR SERPL CREATININE-BSD FRML MDRD: 57 ML/MIN/1.73
GLOBULIN UR ELPH-MCNC: 2.9 GM/DL
GLUCOSE BLD-MCNC: 102 MG/DL (ref 65–99)
GLUCOSE UR STRIP-MCNC: NEGATIVE MG/DL
HCT VFR BLD AUTO: 38.1 % (ref 34–46.6)
HGB BLD-MCNC: 12.6 G/DL (ref 12–15.9)
HGB UR QL STRIP.AUTO: NEGATIVE
IMM GRANULOCYTES # BLD AUTO: 0.01 10*3/MM3 (ref 0–0.05)
IMM GRANULOCYTES NFR BLD AUTO: 0.2 % (ref 0–0.5)
KETONES UR QL STRIP: NEGATIVE
LEUKOCYTE ESTERASE UR QL STRIP.AUTO: NEGATIVE
LYMPHOCYTES # BLD AUTO: 1.36 10*3/MM3 (ref 0.7–3.1)
LYMPHOCYTES NFR BLD AUTO: 25.9 % (ref 19.6–45.3)
MCH RBC QN AUTO: 30.4 PG (ref 26.6–33)
MCHC RBC AUTO-ENTMCNC: 33.1 G/DL (ref 31.5–35.7)
MCV RBC AUTO: 91.8 FL (ref 79–97)
MONOCYTES # BLD AUTO: 0.49 10*3/MM3 (ref 0.1–0.9)
MONOCYTES NFR BLD AUTO: 9.3 % (ref 5–12)
NEUTROPHILS # BLD AUTO: 3.25 10*3/MM3 (ref 1.7–7)
NEUTROPHILS NFR BLD AUTO: 61.9 % (ref 42.7–76)
NITRITE UR QL STRIP: NEGATIVE
NRBC BLD AUTO-RTO: 0 /100 WBC (ref 0–0.2)
PH UR STRIP.AUTO: 6.5 [PH] (ref 5–8)
PLATELET # BLD AUTO: 208 10*3/MM3 (ref 140–450)
PMV BLD AUTO: 10.1 FL (ref 6–12)
POTASSIUM BLD-SCNC: 3.7 MMOL/L (ref 3.5–5.2)
PROT SERPL-MCNC: 6.6 G/DL (ref 6–8.5)
PROT UR QL STRIP: NEGATIVE
RBC # BLD AUTO: 4.15 10*6/MM3 (ref 3.77–5.28)
SODIUM BLD-SCNC: 138 MMOL/L (ref 136–145)
SP GR UR STRIP: 1.02 (ref 1–1.03)
TROPONIN T SERPL-MCNC: <0.01 NG/ML (ref 0–0.03)
UROBILINOGEN UR QL STRIP: NORMAL
WBC NRBC COR # BLD: 5.25 10*3/MM3 (ref 3.4–10.8)

## 2020-06-28 PROCEDURE — 84484 ASSAY OF TROPONIN QUANT: CPT | Performed by: NURSE PRACTITIONER

## 2020-06-28 PROCEDURE — 93005 ELECTROCARDIOGRAM TRACING: CPT | Performed by: NURSE PRACTITIONER

## 2020-06-28 PROCEDURE — 85025 COMPLETE CBC W/AUTO DIFF WBC: CPT | Performed by: NURSE PRACTITIONER

## 2020-06-28 PROCEDURE — 71045 X-RAY EXAM CHEST 1 VIEW: CPT

## 2020-06-28 PROCEDURE — 93010 ELECTROCARDIOGRAM REPORT: CPT | Performed by: INTERNAL MEDICINE

## 2020-06-28 PROCEDURE — 99283 EMERGENCY DEPT VISIT LOW MDM: CPT

## 2020-06-28 PROCEDURE — 70450 CT HEAD/BRAIN W/O DYE: CPT

## 2020-06-28 PROCEDURE — P9612 CATHETERIZE FOR URINE SPEC: HCPCS

## 2020-06-28 PROCEDURE — 81003 URINALYSIS AUTO W/O SCOPE: CPT | Performed by: NURSE PRACTITIONER

## 2020-06-28 PROCEDURE — 80053 COMPREHEN METABOLIC PANEL: CPT | Performed by: NURSE PRACTITIONER

## 2020-06-29 VITALS
WEIGHT: 134 LBS | HEART RATE: 66 BPM | TEMPERATURE: 97.4 F | RESPIRATION RATE: 16 BRPM | OXYGEN SATURATION: 95 % | SYSTOLIC BLOOD PRESSURE: 158 MMHG | BODY MASS INDEX: 23.74 KG/M2 | HEIGHT: 63 IN | DIASTOLIC BLOOD PRESSURE: 80 MMHG

## 2020-06-29 LAB
HOLD SPECIMEN: NORMAL
HOLD SPECIMEN: NORMAL
WHOLE BLOOD HOLD SPECIMEN: NORMAL
WHOLE BLOOD HOLD SPECIMEN: NORMAL

## 2020-08-18 ENCOUNTER — TELEPHONE (OUTPATIENT)
Dept: NEUROLOGY | Facility: CLINIC | Age: 85
End: 2020-08-18

## 2020-08-18 NOTE — TELEPHONE ENCOUNTER
Patient called about her rt ankle swelling for about 2-3 days and she has been wearing the compression socks but was concerned and wanted to call shayna about it.    Please call [phone]

## 2020-08-19 NOTE — TELEPHONE ENCOUNTER
Pt sts swelling is better today. I advised pt to call cardiologist or PCP regarding ankle swelling. Pt verbalized understanding.

## 2020-08-20 ENCOUNTER — OFFICE VISIT (OUTPATIENT)
Dept: CARDIOLOGY | Facility: CLINIC | Age: 85
End: 2020-08-20

## 2020-08-20 VITALS
OXYGEN SATURATION: 97 % | HEIGHT: 63 IN | DIASTOLIC BLOOD PRESSURE: 68 MMHG | BODY MASS INDEX: 23.74 KG/M2 | HEART RATE: 65 BPM | WEIGHT: 134 LBS | SYSTOLIC BLOOD PRESSURE: 118 MMHG

## 2020-08-20 DIAGNOSIS — Z86.79 HISTORY OF ATRIAL FIBRILLATION: ICD-10-CM

## 2020-08-20 DIAGNOSIS — I10 ESSENTIAL HYPERTENSION: Primary | ICD-10-CM

## 2020-08-20 PROCEDURE — 99213 OFFICE O/P EST LOW 20 MIN: CPT | Performed by: INTERNAL MEDICINE

## 2020-08-20 NOTE — PROGRESS NOTES
Subjective:     Encounter Date:08/20/20        Patient ID: Yolis Ospina is a 89 y.o. female.    Chief Complaint:  Hypertension   This is a chronic problem. The current episode started more than 1 year ago. The problem is controlled. Associated symptoms include blurred vision and malaise/fatigue.   Atrial Fibrillation   Presents for initial visit. The symptoms have been stable. Past medical history includes atrial fibrillation.     89-year-old female who presents today for reevaluation.  Patient has history of atrial fibrillation with a subsequent stroke.  Overall she is doing well her biggest complaint is her balance is bad.  She denies chest pain shortness of breath palpitations does have some lightheadedness swelling and fatigue.    Review of Systems   Constitution: Positive for malaise/fatigue.   Eyes: Positive for blurred vision.   Respiratory: Positive for cough.    Skin: Positive for color change.   Musculoskeletal: Positive for myalgias.   Gastrointestinal: Positive for diarrhea.   Psychiatric/Behavioral: Positive for depression. The patient is nervous/anxious.    All other systems reviewed and are negative.      Procedures         Objective:     Physical Exam   Constitutional: She is oriented to person, place, and time. She appears well-developed.   HENT:   Head: Normocephalic.   Eyes: Conjunctivae are normal.   Neck: Normal range of motion.   Cardiovascular: Normal rate, regular rhythm and normal heart sounds.   Pulmonary/Chest: Breath sounds normal.   Abdominal: Soft. Bowel sounds are normal.   Musculoskeletal: Normal range of motion. She exhibits no edema.   Neurological: She is alert and oriented to person, place, and time.   Skin: Skin is warm and dry.   Psychiatric: She has a normal mood and affect. Her behavior is normal.   Vitals reviewed.      Lab Review:       Assessment:          Diagnosis Plan   1. Essential hypertension     2. History of atrial fibrillation            Plan:       1.   Paroxysmal atrial fibrillation.  Patient had a stroke.  Clinically she is tolerating her medicines and doing well.  2.  Hypertension blood pressures good  3.  Patient has some intermittent swelling in her ankle that look pretty good.  4.  Continue same follow-up 1 year sooner if issues.    Atrial Fibrillation and Atrial Flutter  Assessment  • The patient has paroxysmal atrial fibrillation  • This is non-valvular in etiology  • The patient's CHADS2-VASc score is 6  • A [id number] score of 2 or more is considered a high risk for a thromboembolic event  • Apixaban prescribed    Plan  • Attempt to maintain sinus rhythm  • Continue apixaban for antithrombotic therapy, bleeding issues discussed

## 2020-08-21 ENCOUNTER — TELEPHONE (OUTPATIENT)
Dept: CARDIOLOGY | Facility: CLINIC | Age: 85
End: 2020-08-21

## 2020-08-21 NOTE — TELEPHONE ENCOUNTER
Patient called and left me a voice mail stating she did not want to see Dr Miller again.  I attempted to call her back to find out what the issue was but was unable to reach her and there was no voice mail.

## 2020-09-16 ENCOUNTER — OFFICE VISIT (OUTPATIENT)
Dept: FAMILY MEDICINE CLINIC | Facility: CLINIC | Age: 85
End: 2020-09-16

## 2020-09-16 VITALS
HEART RATE: 58 BPM | TEMPERATURE: 97.3 F | SYSTOLIC BLOOD PRESSURE: 152 MMHG | DIASTOLIC BLOOD PRESSURE: 82 MMHG | RESPIRATION RATE: 16 BRPM | WEIGHT: 135 LBS | HEIGHT: 63 IN | OXYGEN SATURATION: 98 % | BODY MASS INDEX: 23.92 KG/M2

## 2020-09-16 DIAGNOSIS — Z86.73 HISTORY OF STROKE: ICD-10-CM

## 2020-09-16 DIAGNOSIS — F41.9 ANXIETY: ICD-10-CM

## 2020-09-16 DIAGNOSIS — I10 ESSENTIAL HYPERTENSION: ICD-10-CM

## 2020-09-16 PROCEDURE — 99213 OFFICE O/P EST LOW 20 MIN: CPT | Performed by: FAMILY MEDICINE

## 2020-09-16 RX ORDER — BUSPIRONE HYDROCHLORIDE 5 MG/1
5 TABLET ORAL DAILY
Qty: 90 TABLET | Refills: 1 | Status: SHIPPED | OUTPATIENT
Start: 2020-09-16 | End: 2021-05-26 | Stop reason: SDUPTHER

## 2020-09-16 RX ORDER — PAROXETINE 10 MG/1
10 TABLET, FILM COATED ORAL EVERY MORNING
Qty: 90 TABLET | Refills: 1 | Status: SHIPPED | OUTPATIENT
Start: 2020-09-16 | End: 2021-05-26 | Stop reason: SDUPTHER

## 2020-09-16 RX ORDER — AMLODIPINE BESYLATE 2.5 MG/1
2.5 TABLET ORAL DAILY
Qty: 90 TABLET | Refills: 1 | Status: SHIPPED | OUTPATIENT
Start: 2020-09-16 | End: 2020-12-30 | Stop reason: SDUPTHER

## 2020-09-16 RX ORDER — PRAVASTATIN SODIUM 40 MG
40 TABLET ORAL DAILY
Qty: 90 TABLET | Refills: 1 | Status: SHIPPED | OUTPATIENT
Start: 2020-09-16 | End: 2021-05-26 | Stop reason: SDUPTHER

## 2020-09-16 NOTE — PROGRESS NOTES
Subjective   Chief Complaint:   Chief Complaint   Patient presents with   • Hypertension   • Hyperlipidemia   • Hypothyroidism   • Anxiety   • Flu Vaccine         History of Present Illness I refilled the following meds: Norvasc 2.5mg daily, Eliquis 2.5mg daily, Buspar 5mg daily, Paxil 10mg daily and pravastatin 40mg daily. We reviewed her labs from July 2020 in detail: glucose 77, calcium 10.5, TSH 3.4.    Patient has had a mastectomy, wears breast prosthesis and bras for balance and symmetry. She requires new products because hers are past their useful lifespan.     Flu shot administered today.         Past Medical History:   Diagnosis Date   • Cerebrovascular accident (CVA) due to thrombosis of left middle cerebral artery (CMS/HCC)    • H/O complete eye exam DUE   • History of atrial fibrillation    • History of breast cancer 08/13/2008    Right Breast, Ajacent to a 4.3x3.0x2.9cm biopsy cavity focus of in-situ and invasive ductal carcinoma spanning a length of approximately 0.4cm. Skin, nipple, margins negative. Lymphovascular invasion none seen. axillary tail lymph nodes(2) negatives, ER/[place] +, HER2 -   • History of colon polyps 11/01/2010    Cecal Polyp: Fragments of Adenomatous polyp w/ mild atypia   • History of diverticulosis 11/01/2010    Sigmoid Colon   • History of stroke    • Hx of bone density study 09/24/2013   • Hypercalcemia    • Hyperlipidemia    • Hypertension    • Hypothyroid    • Malignant neoplasm of right kidney (CMS/HCC)    • Palpitation    • Primary hyperparathyroidism (CMS/HCC)    • Spondylolisthesis of lumbar region     at L4-5 level and spinal stenosis   • Transient confusion    • Vitamin D deficiency         Yolis Ospina 89 y.o. female who presents today for Medical Management of the below listed issues, medication refills and a Flu Shot.    ICD-10-CM ICD-9-CM   1. Anxiety  F41.9 300.00   2. History of stroke  Z86.73 V12.54   3. Essential hypertension  I10 401.9        she has a problem  "list of   Patient Active Problem List   Diagnosis   • Fatigue   • Hypercalcemia   • Vitamin D deficiency   • Thyroid nodule   • Essential hypertension   • Hypothyroidism   • Hyperlipidemia   • Palpitation   • Anxiety   • Primary hyperparathyroidism (CMS/HCC)   • Dysarthria   • Cerebrovascular accident (CVA) due to thrombosis of left middle cerebral artery (CMS/HCC)   • History of atrial fibrillation   • Hospital discharge follow-up   • History of stroke   • Nocturnal hypoxemia   • Malignant neoplasm of right kidney (CMS/HCC)   • Gait disturbance, post-stroke   .    she has been compliant with   Current Outpatient Medications:   •  amLODIPine (NORVASC) 2.5 MG tablet, Take 1 tablet by mouth Daily., Disp: 90 tablet, Rfl: 1  •  apixaban (Eliquis) 2.5 MG tablet tablet, Take 1 tablet by mouth Every 12 (Twelve) Hours., Disp: 90 tablet, Rfl: 1  •  busPIRone (BUSPAR) 5 MG tablet, Take 1 tablet by mouth Daily., Disp: 90 tablet, Rfl: 1  •  PARoxetine (PAXIL) 10 MG tablet, Take 1 tablet by mouth Every Morning. For stress, Disp: 90 tablet, Rfl: 1  •  pravastatin (Pravachol) 40 MG tablet, Take 1 tablet by mouth Daily. For cholesterol; new dose, Disp: 90 tablet, Rfl: 1.  she denies medication side effects.        /82   Pulse 58   Temp 97.3 °F (36.3 °C)   Resp 16   Ht 160 cm (63\")   Wt 61.2 kg (135 lb)   LMP  (LMP Unknown)   SpO2 98%   BMI 23.91 kg/m²     Results for orders placed or performed during the hospital encounter of 06/28/20   Comprehensive Metabolic Panel    Specimen: Blood   Result Value Ref Range    Glucose 102 (H) 65 - 99 mg/dL    BUN 17 8 - 23 mg/dL    Creatinine 0.92 0.57 - 1.00 mg/dL    Sodium 138 136 - 145 mmol/L    Potassium 3.7 3.5 - 5.2 mmol/L    Chloride 107 98 - 107 mmol/L    CO2 22.5 22.0 - 29.0 mmol/L    Calcium 10.6 (H) 8.2 - 9.6 mg/dL    Total Protein 6.6 6.0 - 8.5 g/dL    Albumin 3.70 3.50 - 5.20 g/dL    ALT (SGPT) 18 1 - 33 U/L    AST (SGOT) 22 1 - 32 U/L    Alkaline Phosphatase 87 39 - " 117 U/L    Total Bilirubin 0.3 0.2 - 1.2 mg/dL    eGFR Non African Amer 57 (L) >60 mL/min/1.73    Globulin 2.9 gm/dL    A/G Ratio 1.3 g/dL    BUN/Creatinine Ratio 18.5 7.0 - 25.0    Anion Gap 8.5 5.0 - 15.0 mmol/L   Urinalysis With Microscopic If Indicated (No Culture) - Urine, Catheter In/Out    Specimen: Urine, Catheter In/Out   Result Value Ref Range    Color, UA Yellow Yellow, Straw    Appearance, UA Clear Clear    pH, UA 6.5 5.0 - 8.0    Specific Gravity, UA 1.017 1.005 - 1.030    Glucose, UA Negative Negative    Ketones, UA Negative Negative    Bilirubin, UA Negative Negative    Blood, UA Negative Negative    Protein, UA Negative Negative    Leuk Esterase, UA Negative Negative    Nitrite, UA Negative Negative    Urobilinogen, UA 0.2 E.U./dL 0.2 - 1.0 E.U./dL   Troponin    Specimen: Blood   Result Value Ref Range    Troponin T <0.010 0.000 - 0.030 ng/mL   CBC Auto Differential    Specimen: Blood   Result Value Ref Range    WBC 5.25 3.40 - 10.80 10*3/mm3    RBC 4.15 3.77 - 5.28 10*6/mm3    Hemoglobin 12.6 12.0 - 15.9 g/dL    Hematocrit 38.1 34.0 - 46.6 %    MCV 91.8 79.0 - 97.0 fL    MCH 30.4 26.6 - 33.0 pg    MCHC 33.1 31.5 - 35.7 g/dL    RDW 13.0 12.3 - 15.4 %    RDW-SD 43.9 37.0 - 54.0 fl    MPV 10.1 6.0 - 12.0 fL    Platelets 208 140 - 450 10*3/mm3    Neutrophil % 61.9 42.7 - 76.0 %    Lymphocyte % 25.9 19.6 - 45.3 %    Monocyte % 9.3 5.0 - 12.0 %    Eosinophil % 2.1 0.3 - 6.2 %    Basophil % 0.6 0.0 - 1.5 %    Immature Grans % 0.2 0.0 - 0.5 %    Neutrophils, Absolute 3.25 1.70 - 7.00 10*3/mm3    Lymphocytes, Absolute 1.36 0.70 - 3.10 10*3/mm3    Monocytes, Absolute 0.49 0.10 - 0.90 10*3/mm3    Eosinophils, Absolute 0.11 0.00 - 0.40 10*3/mm3    Basophils, Absolute 0.03 0.00 - 0.20 10*3/mm3    Immature Grans, Absolute 0.01 0.00 - 0.05 10*3/mm3    nRBC 0.0 0.0 - 0.2 /100 WBC   Light Blue Top   Result Value Ref Range    Extra Tube hold for add-on    Green Top (Gel)   Result Value Ref Range    Extra Tube Hold  for add-ons.    Lavender Top   Result Value Ref Range    Extra Tube hold for add-on    Gold Top - SST   Result Value Ref Range    Extra Tube Hold for add-ons.        The following portions of the patient's history were reviewed and updated as appropriate: allergies, current medications, past family history, past medical history, past social history, past surgical history and problem list.      she has a history of   Patient Active Problem List   Diagnosis   • Fatigue   • Hypercalcemia   • Vitamin D deficiency   • Thyroid nodule   • Essential hypertension   • Hypothyroidism   • Hyperlipidemia   • Palpitation   • Anxiety   • Primary hyperparathyroidism (CMS/HCC)   • Dysarthria   • Cerebrovascular accident (CVA) due to thrombosis of left middle cerebral artery (CMS/HCC)   • History of atrial fibrillation   • Hospital discharge follow-up   • History of stroke   • Nocturnal hypoxemia   • Malignant neoplasm of right kidney (CMS/HCC)   • Gait disturbance, post-stroke       Review of Systems   Constitutional: Negative for activity change, appetite change and unexpected weight change.   Eyes: Negative for visual disturbance.   Respiratory: Negative for chest tightness and shortness of breath.    Cardiovascular: Negative for chest pain and palpitations.   Skin: Negative for color change.   Neurological: Negative for syncope and speech difficulty.   Psychiatric/Behavioral: Negative for confusion and decreased concentration.   All other systems reviewed and are negative.      Objective   Physical Exam  Vitals signs and nursing note reviewed.   Constitutional:       Appearance: She is well-developed.   HENT:      Head: Normocephalic and atraumatic.      Right Ear: External ear normal.      Left Ear: External ear normal.      Nose: Nose normal.   Eyes:      Pupils: Pupils are equal, round, and reactive to light.   Neck:      Musculoskeletal: Normal range of motion and neck supple.   Cardiovascular:      Rate and Rhythm: Normal  rate and regular rhythm.   Pulmonary:      Effort: Pulmonary effort is normal.      Breath sounds: Normal breath sounds.   Abdominal:      General: Bowel sounds are normal.      Palpations: Abdomen is soft.   Neurological:      Mental Status: She is alert and oriented to person, place, and time.   Psychiatric:         Behavior: Behavior normal.         Assessment/Plan   Yolis was seen today for hypertension, hyperlipidemia, hypothyroidism, anxiety and flu vaccine.    Diagnoses and all orders for this visit:    Anxiety  -     PARoxetine (PAXIL) 10 MG tablet; Take 1 tablet by mouth Every Morning. For stress  -     busPIRone (BUSPAR) 5 MG tablet; Take 1 tablet by mouth Daily.    History of stroke  -     Discontinue: apixaban (Eliquis) 2.5 MG tablet tablet; Take 1 tablet by mouth Every 12 (Twelve) Hours.  -     apixaban (Eliquis) 2.5 MG tablet tablet; Take 1 tablet by mouth Every 12 (Twelve) Hours.    Essential hypertension  -     amLODIPine (NORVASC) 2.5 MG tablet; Take 1 tablet by mouth Daily.    Other orders  -     pravastatin (Pravachol) 40 MG tablet; Take 1 tablet by mouth Daily. For cholesterol; new dose  -     Fluad Quad >65 years    Labs results discussed in detail with the patient, if no recent labs were done, order placed today.  Plan to update vaccines if needed today.  I  reviewed health maintenance with the patient as part of my preventative care plan. I discussed preventative counseling with the patient in detail.

## 2020-09-16 NOTE — PATIENT INSTRUCTIONS
Influenza (Flu) Vaccine (Inactivated or Recombinant): What You Need to Know  1. Why get vaccinated?  Influenza vaccine can prevent influenza (flu).  Flu is a contagious disease that spreads around the [place] every year, usually between October and May. Anyone can get the flu, but it is more dangerous for some people. Infants and young children, people 65 years of age and older, pregnant women, and people with certain health conditions or a weakened immune system are at greatest risk of flu complications.  Pneumonia, bronchitis, sinus infections and ear infections are examples of flu-related complications. If you have a medical condition, such as heart disease, cancer or diabetes, flu can make it worse.  Flu can cause fever and chills, sore throat, muscle aches, fatigue, cough, headache, and runny or stuffy nose. Some people may have vomiting and diarrhea, though this is more common in children than adults.  Each year thousands of people in the [place] die from flu, and many more are hospitalized. Flu vaccine prevents millions of illnesses and flu-related visits to the doctor each year.  2. Influenza vaccine  CDC recommends everyone 6 months of age and older get vaccinated every flu season. Children 6 months through 8 years of age may need 2 doses during a single flu season. Everyone else needs only 1 dose each flu season.  It takes about 2 weeks for protection to develop after vaccination.  There are many flu viruses, and they are always changing. Each year a new flu vaccine is made to protect against three or four viruses that are likely to cause disease in the upcoming flu season. Even when the vaccine doesn't exactly match these viruses, it may still provide some protection.  Influenza vaccine does not cause flu.  Influenza vaccine may be given at the same time as other vaccines.  3. Talk with your health care provider  Tell your vaccine provider if the person getting the vaccine:  · Has had an  allergic reaction after a previous dose of influenza vaccine, or has any severe, life-threatening allergies.  · Has ever had Guillain-Barré Syndrome (also called GBS).  In some cases, your health care provider may decide to postpone influenza vaccination to a future visit.  People with minor illnesses, such as a cold, may be vaccinated. People who are moderately or severely ill should usually wait until they recover before getting influenza vaccine.  Your health care provider can give you more information.  4. Risks of a vaccine reaction  · Soreness, redness, and swelling where shot is given, fever, muscle aches, and headache can happen after influenza vaccine.  · There may be a very small increased risk of Guillain-Barré Syndrome (GBS) after inactivated influenza vaccine (the flu shot).  Young children who get the flu shot along with pneumococcal vaccine (PCV13), and/or DTaP vaccine at the same time might be slightly more likely to have a seizure caused by fever. Tell your health care provider if a child who is getting flu vaccine has ever had a seizure.  People sometimes faint after medical procedures, including vaccination. Tell your provider if you feel dizzy or have vision changes or ringing in the ears.  As with any medicine, there is a very remote chance of a vaccine causing a severe allergic reaction, other serious injury, or death.  5. What if there is a serious problem?  An allergic reaction could occur after the vaccinated person leaves the clinic. If you see signs of a severe allergic reaction (hives, swelling of the face and throat, difficulty breathing, a fast heartbeat, dizziness, or weakness), call 9-1-1 and get the person to the nearest hospital.  For other signs that concern you, call your health care provider.  Adverse reactions should be reported to the Vaccine Adverse Event Reporting System (VAERS). Your health care provider will usually file this report, or you can do it yourself. Visit the  VAERS website at www.vaers.[facility].gov or call 1-466.564.4476.VAERS is only for reporting reactions, and VAERS staff do not give medical advice.  6. The National Vaccine Injury Compensation Program  The National Vaccine Injury Compensation Program (VICP) is a federal program that was created to compensate people who may have been injured by certain vaccines. Visit the VICP website at www.[facility].gov/vaccinecompensation or call 1-965.965.9874 to learn about the program and about filing a claim. There is a time limit to file a claim for compensation.  7. How can I learn more?  · Ask your healthcare provider.  · Call your local or state health department.  · Contact the Centers for Disease Control and Prevention (CDC):  ? Call 1-239.830.6580 (8-993-UNX-INFO) or  ? Visit CDC's www.cdc.gov/flu  Vaccine Information Statement (Interim) Inactivated Influenza Vaccine (8/15/2019)  This information is not intended to replace advice given to you by your health care provider. Make sure you discuss any questions you have with your health care provider.  Document Released: 10/12/2007 Document Revised: 04/07/2020 Document Reviewed: 08/19/2019  Elsevier Patient Education © 2020 [employer]

## 2020-09-17 PROCEDURE — G0008 ADMIN INFLUENZA VIRUS VAC: HCPCS | Performed by: FAMILY MEDICINE

## 2020-09-17 PROCEDURE — 90694 VACC AIIV4 NO PRSRV 0.5ML IM: CPT | Performed by: FAMILY MEDICINE

## 2020-10-14 ENCOUNTER — DOCUMENTATION (OUTPATIENT)
Dept: FAMILY MEDICINE CLINIC | Facility: CLINIC | Age: 85
End: 2020-10-14

## 2020-10-15 NOTE — PROGRESS NOTES
ADD THIS STATEMENT TO HER RECORD   DATE   10/14/2020       this deals with the enclosed order for mastectomy supplies.  This is for Medicare to cover the cost of supplies additional documentation is required as stated this is an addendum to the patient's current medical records and it states that the patient has had a mastectomy wears breast prosthesis and bras for balance and symmetry.  She requires new products because hers are past their useful life span and I will have someone [id number] this document to your office.         Morales Bauer MD date 10/14/2020

## 2020-12-30 DIAGNOSIS — I10 ESSENTIAL HYPERTENSION: ICD-10-CM

## 2020-12-30 NOTE — TELEPHONE ENCOUNTER
Refill Med.  [facility] called and stated that her RX request got sent to her old Dr and was refused.  She has been out of amlodipine.  The requested we send to [facility], because it will take them 2 weeks to get it to her.  Sent over 90 day supply and she will be due appt in March.  Michel will call her and let her know

## 2020-12-31 RX ORDER — AMLODIPINE BESYLATE 2.5 MG/1
2.5 TABLET ORAL DAILY
Qty: 90 TABLET | Refills: 0 | Status: SHIPPED | OUTPATIENT
Start: 2020-12-31 | End: 2021-05-26 | Stop reason: SDUPTHER

## 2021-02-17 NOTE — OUTREACH NOTE
If you have any new or worsening symptoms please return immediately to the emergency department  Is important a follow-up with your cardiologist for continued care and evaluation  Prep Survey      Responses   Facility patient discharged from?  [place]   Is patient eligible?  Yes   Discharge diagnosis  CVA d/t thrombosis   Does the patient have one of the following disease processes/diagnoses(primary or secondary)?  Stroke (TIA)   Does the patient have Home health ordered?  No   Is there a DME ordered?  No   Medication alerts for this patient  Elikaren started    Prep survey completed?  Yes          Jacquelyn Ospina RN

## 2021-05-26 ENCOUNTER — OFFICE VISIT (OUTPATIENT)
Dept: FAMILY MEDICINE CLINIC | Facility: CLINIC | Age: 86
End: 2021-05-26

## 2021-05-26 VITALS
BODY MASS INDEX: 23.39 KG/M2 | TEMPERATURE: 97.5 F | RESPIRATION RATE: 16 BRPM | OXYGEN SATURATION: 98 % | HEART RATE: 62 BPM | SYSTOLIC BLOOD PRESSURE: 126 MMHG | DIASTOLIC BLOOD PRESSURE: 68 MMHG | HEIGHT: 63 IN | WEIGHT: 132 LBS

## 2021-05-26 DIAGNOSIS — E78.2 MIXED HYPERLIPIDEMIA: ICD-10-CM

## 2021-05-26 DIAGNOSIS — R79.89 HIGH SERUM PARATHYROID HORMONE (PTH): ICD-10-CM

## 2021-05-26 DIAGNOSIS — E21.3 HYPERPARATHYROIDISM (HCC): ICD-10-CM

## 2021-05-26 DIAGNOSIS — C64.1 MALIGNANT NEOPLASM OF RIGHT KIDNEY (HCC): ICD-10-CM

## 2021-05-26 DIAGNOSIS — E03.9 ACQUIRED HYPOTHYROIDISM: ICD-10-CM

## 2021-05-26 DIAGNOSIS — Z86.73 HISTORY OF STROKE: ICD-10-CM

## 2021-05-26 DIAGNOSIS — I10 ESSENTIAL HYPERTENSION: Primary | ICD-10-CM

## 2021-05-26 DIAGNOSIS — Z86.79 HISTORY OF ATRIAL FIBRILLATION: ICD-10-CM

## 2021-05-26 DIAGNOSIS — F41.9 ANXIETY: ICD-10-CM

## 2021-05-26 PROBLEM — F41.1 GAD (GENERALIZED ANXIETY DISORDER): Status: ACTIVE | Noted: 2020-03-10

## 2021-05-26 PROBLEM — Z85.528 HISTORY OF KIDNEY CANCER: Status: ACTIVE | Noted: 2020-03-10

## 2021-05-26 PROBLEM — E04.1 THYROID NODULE: Status: ACTIVE | Noted: 2020-03-10

## 2021-05-26 PROBLEM — Z85.3 HISTORY OF BREAST CANCER: Status: ACTIVE | Noted: 2020-03-10

## 2021-05-26 PROBLEM — E55.9 VITAMIN D DEFICIENCY: Status: ACTIVE | Noted: 2020-03-10

## 2021-05-26 PROBLEM — E78.5 DYSLIPIDEMIA: Status: ACTIVE | Noted: 2020-03-10

## 2021-05-26 PROCEDURE — 99214 OFFICE O/P EST MOD 30 MIN: CPT | Performed by: PHYSICIAN ASSISTANT

## 2021-05-26 RX ORDER — AMLODIPINE BESYLATE 2.5 MG/1
2.5 TABLET ORAL DAILY
Qty: 90 TABLET | Refills: 1 | Status: SHIPPED | OUTPATIENT
Start: 2021-05-26 | End: 2021-11-29

## 2021-05-26 RX ORDER — BUSPIRONE HYDROCHLORIDE 5 MG/1
5 TABLET ORAL DAILY
Qty: 90 TABLET | Refills: 1 | Status: SHIPPED | OUTPATIENT
Start: 2021-05-26 | End: 2021-06-15

## 2021-05-26 RX ORDER — PAROXETINE 10 MG/1
10 TABLET, FILM COATED ORAL EVERY MORNING
Qty: 90 TABLET | Refills: 1 | Status: SHIPPED | OUTPATIENT
Start: 2021-05-26 | End: 2021-06-15

## 2021-05-26 RX ORDER — PRAVASTATIN SODIUM 40 MG
40 TABLET ORAL DAILY
Qty: 90 TABLET | Refills: 1 | Status: SHIPPED | OUTPATIENT
Start: 2021-05-26 | End: 2021-08-13 | Stop reason: SDUPTHER

## 2021-05-26 NOTE — PROGRESS NOTES
"Subjective   Yolis Ospina is a 89 y.o. female.     History of Present Illness    Since the last visit, she has overall felt anxious.  She has Essential Hypertension and well controlled on current medication, Impaired fasting glucose and will monitor labs to watch for DMII, Hyperlipidemia with goals met with current Rx, Atrial Fibillation and remains under the care of their cardiologist for management and Vitamin D deficiency and will update labs for continued management.  she has been compliant with current medications have reviewed them.  The patient denies medication side effects.  Will refill medications. /68 (BP Location: Left arm, Patient Position: Sitting, Cuff Size: Adult)   Pulse 62   Temp 97.5 °F (36.4 °C)   Resp 16   Ht 160 cm (62.99\")   Wt 59.9 kg (132 lb)   LMP  (LMP Unknown)   SpO2 98%   BMI 23.39 kg/m²     Results for orders placed or performed during the hospital encounter of 06/28/20   Comprehensive Metabolic Panel    Specimen: Blood   Result Value Ref Range    Glucose 102 (H) 65 - 99 mg/dL    BUN 17 8 - 23 mg/dL    Creatinine 0.92 0.57 - 1.00 mg/dL    Sodium 138 136 - 145 mmol/L    Potassium 3.7 3.5 - 5.2 mmol/L    Chloride 107 98 - 107 mmol/L    CO2 22.5 22.0 - 29.0 mmol/L    Calcium 10.6 (H) 8.2 - 9.6 mg/dL    Total Protein 6.6 6.0 - 8.5 g/dL    Albumin 3.70 3.50 - 5.20 g/dL    ALT (SGPT) 18 1 - 33 U/L    AST (SGOT) 22 1 - 32 U/L    Alkaline Phosphatase 87 39 - 117 U/L    Total Bilirubin 0.3 0.2 - 1.2 mg/dL    eGFR Non African Amer 57 (L) >60 mL/min/1.73    Globulin 2.9 gm/dL    A/G Ratio 1.3 g/dL    BUN/Creatinine Ratio 18.5 7.0 - 25.0    Anion Gap 8.5 5.0 - 15.0 mmol/L   Urinalysis With Microscopic If Indicated (No Culture) - Urine, Catheter In/Out    Specimen: Urine, Catheter In/Out   Result Value Ref Range    Color, UA Yellow Yellow, Straw    Appearance, UA Clear Clear    pH, UA 6.5 5.0 - 8.0    Specific Gravity, UA 1.017 1.005 - 1.030    Glucose, UA Negative Negative    " Ketones, UA Negative Negative    Bilirubin, UA Negative Negative    Blood, UA Negative Negative    Protein, UA Negative Negative    Leuk Esterase, UA Negative Negative    Nitrite, UA Negative Negative    Urobilinogen, UA 0.2 E.U./dL 0.2 - 1.0 E.U./dL   Troponin    Specimen: Blood   Result Value Ref Range    Troponin T <0.010 0.000 - 0.030 ng/mL   CBC Auto Differential    Specimen: Blood   Result Value Ref Range    WBC 5.25 3.40 - 10.80 10*3/mm3    RBC 4.15 3.77 - 5.28 10*6/mm3    Hemoglobin 12.6 12.0 - 15.9 g/dL    Hematocrit 38.1 34.0 - 46.6 %    MCV 91.8 79.0 - 97.0 fL    MCH 30.4 26.6 - 33.0 pg    MCHC 33.1 31.5 - 35.7 g/dL    RDW 13.0 12.3 - 15.4 %    RDW-SD 43.9 37.0 - 54.0 fl    MPV 10.1 6.0 - 12.0 fL    Platelets 208 140 - 450 10*3/mm3    Neutrophil % 61.9 42.7 - 76.0 %    Lymphocyte % 25.9 19.6 - 45.3 %    Monocyte % 9.3 5.0 - 12.0 %    Eosinophil % 2.1 0.3 - 6.2 %    Basophil % 0.6 0.0 - 1.5 %    Immature Grans % 0.2 0.0 - 0.5 %    Neutrophils, Absolute 3.25 1.70 - 7.00 10*3/mm3    Lymphocytes, Absolute 1.36 0.70 - 3.10 10*3/mm3    Monocytes, Absolute 0.49 0.10 - 0.90 10*3/mm3    Eosinophils, Absolute 0.11 0.00 - 0.40 10*3/mm3    Basophils, Absolute 0.03 0.00 - 0.20 10*3/mm3    Immature Grans, Absolute 0.01 0.00 - 0.05 10*3/mm3    nRBC 0.0 0.0 - 0.2 /100 WBC   Light Blue Top   Result Value Ref Range    Extra Tube hold for add-on    Green Top (Gel)   Result Value Ref Range    Extra Tube Hold for add-ons.    Lavender Top   Result Value Ref Range    Extra Tube hold for add-on    Gold Top - SST   Result Value Ref Range    Extra Tube Hold for add-ons.          Hx renal cancer===removed right kidney  Hx breast cancer  Hx stroke 2018---memory changes---see neurologist  Hx past PTH elevation---pt declines work up; no renal stones  Us thyroid==nodules----declines referral for f/u us    Yolis LUCIA Bhavesh female 89 y.o., /68 (BP Location: Left arm, Patient Position: Sitting, Cuff Size: Adult)   Pulse 62   Temp  "97.5 °F (36.4 °C)   Resp 16   Ht 160 cm (62.99\")   Wt 59.9 kg (132 lb)   LMP  (LMP Unknown)   SpO2 98%   BMI 23.39 kg/m²   who presents today for follow up of Anxiety.  She reports anxiety----she is on Buspar routine--this helps.  Need to restart Paxil---not sure why not taking and was helping---restart Paxil. Onset of symptoms was approximately several years ago.  She denies current suicidal and homicidal ideation. Risk factors are chronic illness.  Previous treatment includes current Rx. Was on Paxil She complains of the following medication side effects: none. The patient declines to go to counseling..    Declines DEXA  The following portions of the patient's history were reviewed and updated as appropriate: allergies, current medications, past family history, past medical history, past social history, past surgical history and problem list.    Review of Systems   Constitutional: Positive for activity change and appetite change. Negative for fever.   HENT: Negative for nosebleeds and trouble swallowing.    Eyes: Negative for visual disturbance.   Respiratory: Negative for choking and stridor.    Cardiovascular: Negative for chest pain.   Gastrointestinal: Negative for blood in stool.   Endocrine: Negative for polydipsia.   Genitourinary: Negative for genital sores and hematuria.   Musculoskeletal: Negative for joint swelling.   Skin: Negative for color change and rash.   Allergic/Immunologic: Negative for immunocompromised state.   Neurological: Positive for dizziness. Negative for seizures, facial asymmetry and speech difficulty.   Hematological: Negative for adenopathy.   Psychiatric/Behavioral: Positive for confusion. Negative for behavioral problems, self-injury and suicidal ideas. The patient is nervous/anxious.        Objective   Physical Exam  Vitals and nursing note reviewed.   Constitutional:       General: She is not in acute distress.     Appearance: She is well-developed. She is not " ill-appearing or toxic-appearing.   HENT:      Head: Normocephalic.      Right Ear: External ear normal.      Left Ear: External ear normal.      Nose: Nose normal.      Mouth/Throat:      Pharynx: Oropharynx is clear.   Eyes:      General: No scleral icterus.     Conjunctiva/sclera: Conjunctivae normal.      Pupils: Pupils are equal, round, and reactive to light.   Neck:      Thyroid: No thyromegaly.   Cardiovascular:      Rate and Rhythm: Normal rate and regular rhythm.      Pulses: Normal pulses.      Heart sounds: Normal heart sounds. No murmur heard.     Pulmonary:      Effort: Pulmonary effort is normal. No respiratory distress.      Breath sounds: Normal breath sounds.   Musculoskeletal:         General: No deformity. Normal range of motion.      Cervical back: Normal range of motion and neck supple.   Skin:     General: Skin is warm and dry.      Findings: No rash.   Neurological:      General: No focal deficit present.      Mental Status: She is alert and oriented to person, place, and time. Mental status is at baseline.   Psychiatric:         Mood and Affect: Mood normal.         Behavior: Behavior normal.         Thought Content: Thought content normal.         Judgment: Judgment normal.     lives alone    Assessment/Plan   Diagnoses and all orders for this visit:    1. Essential hypertension (Primary)    2. Acquired hypothyroidism    3. Mixed hyperlipidemia    4. Anxiety    5. History of stroke    6. High serum parathyroid hormone (PTH)    7. History of atrial fibrillation  -     Ambulatory Referral to Cardiology    Plan, Yolis Ospina, was seen today.  she was seen for HTN and continue medication, Imparied fasting glucose and plan follow up labs, diet, and exercise, Hyperlipidemia and will continue current medication, Atrial Fibrillation and remains under the care of their cardiologist for medical management and Vitamin D deficiency and will update labs .  F/u labs; declines referral thyroid and  parathyroid----need labs though  See cardio August a fib  Restart Paxil for anxiety and keep Buspar  See neuro--hx CVA--balance C/o since CVA

## 2021-05-27 LAB
25(OH)D3+25(OH)D2 SERPL-MCNC: 33.1 NG/ML (ref 30–100)
ALBUMIN SERPL-MCNC: 4.4 G/DL (ref 3.5–4.6)
ALBUMIN/GLOB SERPL: 1.5 {RATIO} (ref 1.2–2.2)
ALP SERPL-CCNC: 108 IU/L (ref 48–121)
ALT SERPL-CCNC: 14 IU/L (ref 0–32)
AST SERPL-CCNC: 19 IU/L (ref 0–40)
BASOPHILS # BLD AUTO: 0 X10E3/UL (ref 0–0.2)
BASOPHILS NFR BLD AUTO: 0 %
BILIRUB SERPL-MCNC: 0.4 MG/DL (ref 0–1.2)
BUN SERPL-MCNC: 17 MG/DL (ref 10–36)
BUN/CREAT SERPL: 18 (ref 12–28)
CA-I SERPL ISE-MCNC: 6.4 MG/DL (ref 4.5–5.6)
CALCIUM SERPL-MCNC: 11.3 MG/DL (ref 8.7–10.3)
CHLORIDE SERPL-SCNC: 109 MMOL/L (ref 96–106)
CHOLEST SERPL-MCNC: 174 MG/DL (ref 100–199)
CO2 SERPL-SCNC: 22 MMOL/L (ref 20–29)
CREAT SERPL-MCNC: 0.97 MG/DL (ref 0.57–1)
EOSINOPHIL # BLD AUTO: 0.1 X10E3/UL (ref 0–0.4)
EOSINOPHIL NFR BLD AUTO: 1 %
ERYTHROCYTE [DISTWIDTH] IN BLOOD BY AUTOMATED COUNT: 12.8 % (ref 11.7–15.4)
FOLATE SERPL-MCNC: 9 NG/ML
GLOBULIN SER CALC-MCNC: 3 G/DL (ref 1.5–4.5)
GLUCOSE SERPL-MCNC: 89 MG/DL (ref 65–99)
HBA1C MFR BLD: 5.5 % (ref 4.8–5.6)
HCT VFR BLD AUTO: 42.5 % (ref 34–46.6)
HDLC SERPL-MCNC: 71 MG/DL
HGB BLD-MCNC: 14.2 G/DL (ref 11.1–15.9)
IMM GRANULOCYTES # BLD AUTO: 0 X10E3/UL (ref 0–0.1)
IMM GRANULOCYTES NFR BLD AUTO: 0 %
LDLC SERPL CALC-MCNC: 71 MG/DL (ref 0–99)
LYMPHOCYTES # BLD AUTO: 1.2 X10E3/UL (ref 0.7–3.1)
LYMPHOCYTES NFR BLD AUTO: 21 %
MCH RBC QN AUTO: 30.1 PG (ref 26.6–33)
MCHC RBC AUTO-ENTMCNC: 33.4 G/DL (ref 31.5–35.7)
MCV RBC AUTO: 90 FL (ref 79–97)
MONOCYTES # BLD AUTO: 0.5 X10E3/UL (ref 0.1–0.9)
MONOCYTES NFR BLD AUTO: 9 %
NEUTROPHILS # BLD AUTO: 3.8 X10E3/UL (ref 1.4–7)
NEUTROPHILS NFR BLD AUTO: 69 %
PHOSPHATE SERPL-MCNC: 3.1 MG/DL (ref 3–4.3)
PLATELET # BLD AUTO: 252 X10E3/UL (ref 150–450)
POTASSIUM SERPL-SCNC: 4.7 MMOL/L (ref 3.5–5.2)
PROT SERPL-MCNC: 7.4 G/DL (ref 6–8.5)
PTH-INTACT SERPL-MCNC: 116 PG/ML (ref 15–65)
RBC # BLD AUTO: 4.71 X10E6/UL (ref 3.77–5.28)
SODIUM SERPL-SCNC: 143 MMOL/L (ref 134–144)
T3FREE SERPL-MCNC: 2.6 PG/ML (ref 2–4.4)
T4 FREE SERPL-MCNC: 0.87 NG/DL (ref 0.82–1.77)
TRIGL SERPL-MCNC: 195 MG/DL (ref 0–149)
TSH SERPL DL<=0.005 MIU/L-ACNC: 4.57 UIU/ML (ref 0.45–4.5)
VIT B12 SERPL-MCNC: 454 PG/ML (ref 232–1245)
VLDLC SERPL CALC-MCNC: 32 MG/DL (ref 5–40)
WBC # BLD AUTO: 5.6 X10E3/UL (ref 3.4–10.8)

## 2021-05-31 ENCOUNTER — HOSPITAL ENCOUNTER (EMERGENCY)
Facility: HOSPITAL | Age: 86
Discharge: HOME | End: 2021-05-31
Attending: EMERGENCY MEDICINE | Admitting: EMERGENCY MEDICINE

## 2021-05-31 ENCOUNTER — APPOINTMENT (OUTPATIENT)
Dept: CT IMAGING | Facility: HOSPITAL | Age: 86
End: 2021-05-31

## 2021-05-31 VITALS
SYSTOLIC BLOOD PRESSURE: 152 MMHG | OXYGEN SATURATION: 96 % | HEART RATE: 57 BPM | WEIGHT: 132 LBS | RESPIRATION RATE: 16 BRPM | DIASTOLIC BLOOD PRESSURE: 82 MMHG | TEMPERATURE: 97 F | HEIGHT: 63 IN | BODY MASS INDEX: 23.39 KG/M2

## 2021-05-31 DIAGNOSIS — Z79.01 ANTICOAGULATED BY ANTICOAGULATION TREATMENT: ICD-10-CM

## 2021-05-31 DIAGNOSIS — I10 UNCONTROLLED HYPERTENSION: Primary | ICD-10-CM

## 2021-05-31 LAB
ALBUMIN SERPL-MCNC: 4 G/DL (ref 3.5–5.2)
ALBUMIN/GLOB SERPL: 1.3 G/DL
ALP SERPL-CCNC: 100 U/L (ref 39–117)
ALT SERPL W P-5'-P-CCNC: 21 U/L (ref 1–33)
ANION GAP SERPL CALCULATED.3IONS-SCNC: 7 MMOL/L (ref 5–15)
AST SERPL-CCNC: 24 U/L (ref 1–32)
BASOPHILS # BLD AUTO: 0.02 10*3/MM3 (ref 0–0.2)
BASOPHILS NFR BLD AUTO: 0.4 % (ref 0–1.5)
BILIRUB SERPL-MCNC: 0.5 MG/DL (ref 0–1.2)
BUN SERPL-MCNC: 16 MG/DL (ref 8–23)
BUN/CREAT SERPL: 17.2 (ref 7–25)
CALCIUM SPEC-SCNC: 10.4 MG/DL (ref 8.2–9.6)
CHLORIDE SERPL-SCNC: 110 MMOL/L (ref 98–107)
CO2 SERPL-SCNC: 24 MMOL/L (ref 22–29)
CREAT SERPL-MCNC: 0.93 MG/DL (ref 0.57–1)
DEPRECATED RDW RBC AUTO: 43.3 FL (ref 37–54)
EOSINOPHIL # BLD AUTO: 0.08 10*3/MM3 (ref 0–0.4)
EOSINOPHIL NFR BLD AUTO: 1.5 % (ref 0.3–6.2)
ERYTHROCYTE [DISTWIDTH] IN BLOOD BY AUTOMATED COUNT: 13 % (ref 12.3–15.4)
GFR SERPL CREATININE-BSD FRML MDRD: 57 ML/MIN/1.73
GLOBULIN UR ELPH-MCNC: 3.1 GM/DL
GLUCOSE SERPL-MCNC: 89 MG/DL (ref 65–99)
HCT VFR BLD AUTO: 40.6 % (ref 34–46.6)
HGB BLD-MCNC: 13.6 G/DL (ref 12–15.9)
IMM GRANULOCYTES # BLD AUTO: 0.01 10*3/MM3 (ref 0–0.05)
IMM GRANULOCYTES NFR BLD AUTO: 0.2 % (ref 0–0.5)
INR PPP: 1.11 (ref 0.9–1.1)
LYMPHOCYTES # BLD AUTO: 1.26 10*3/MM3 (ref 0.7–3.1)
LYMPHOCYTES NFR BLD AUTO: 23.5 % (ref 19.6–45.3)
MCH RBC QN AUTO: 30.5 PG (ref 26.6–33)
MCHC RBC AUTO-ENTMCNC: 33.5 G/DL (ref 31.5–35.7)
MCV RBC AUTO: 91 FL (ref 79–97)
MONOCYTES # BLD AUTO: 0.45 10*3/MM3 (ref 0.1–0.9)
MONOCYTES NFR BLD AUTO: 8.4 % (ref 5–12)
NEUTROPHILS NFR BLD AUTO: 3.55 10*3/MM3 (ref 1.7–7)
NEUTROPHILS NFR BLD AUTO: 66 % (ref 42.7–76)
NRBC BLD AUTO-RTO: 0 /100 WBC (ref 0–0.2)
PLATELET # BLD AUTO: 217 10*3/MM3 (ref 140–450)
PMV BLD AUTO: 10.1 FL (ref 6–12)
POTASSIUM SERPL-SCNC: 3.9 MMOL/L (ref 3.5–5.2)
PROT SERPL-MCNC: 7.1 G/DL (ref 6–8.5)
PROTHROMBIN TIME: 14.1 SECONDS (ref 11.7–14.2)
QT INTERVAL: 459 MS
RBC # BLD AUTO: 4.46 10*6/MM3 (ref 3.77–5.28)
SODIUM SERPL-SCNC: 141 MMOL/L (ref 136–145)
WBC # BLD AUTO: 5.37 10*3/MM3 (ref 3.4–10.8)

## 2021-05-31 PROCEDURE — 93005 ELECTROCARDIOGRAM TRACING: CPT | Performed by: EMERGENCY MEDICINE

## 2021-05-31 PROCEDURE — 99283 EMERGENCY DEPT VISIT LOW MDM: CPT

## 2021-05-31 PROCEDURE — 80053 COMPREHEN METABOLIC PANEL: CPT | Performed by: EMERGENCY MEDICINE

## 2021-05-31 PROCEDURE — 85025 COMPLETE CBC W/AUTO DIFF WBC: CPT | Performed by: EMERGENCY MEDICINE

## 2021-05-31 PROCEDURE — 70450 CT HEAD/BRAIN W/O DYE: CPT

## 2021-05-31 PROCEDURE — 85610 PROTHROMBIN TIME: CPT | Performed by: EMERGENCY MEDICINE

## 2021-05-31 PROCEDURE — 93010 ELECTROCARDIOGRAM REPORT: CPT | Performed by: INTERNAL MEDICINE

## 2021-05-31 RX ORDER — SODIUM CHLORIDE 0.9 % (FLUSH) 0.9 %
10 SYRINGE (ML) INJECTION AS NEEDED
Status: DISCONTINUED | OUTPATIENT
Start: 2021-05-31 | End: 2021-05-31 | Stop reason: HOSPADM

## 2021-06-01 ENCOUNTER — TELEPHONE (OUTPATIENT)
Dept: FAMILY MEDICINE CLINIC | Facility: CLINIC | Age: 86
End: 2021-06-01

## 2021-06-01 NOTE — TELEPHONE ENCOUNTER
Caller: Kwasi (ELOY)Teresa  VERBAL ON FILE     Relationship: Emergency Contact    Best call back number: [phone]    What is the best time to reach you: ANY     Who are you requesting to speak with (clinical staff, provider,  specific staff member): CARLY     What was the call regarding: PATIENTS DAUGHTER CALLED STATING PATIENTS BLOOD PRESSURE /82THIS MORNING 06/01. PATIENT WENT TO ER YESTERDAY DO TOO BLOOD PRESSURE BEING 200/100.     Do you require a callback: YES

## 2021-06-01 NOTE — DISCHARGE INSTRUCTIONS
If your systolic blood pressure remains greater than 150 I would advise you to increase your Norvasc from 2.5 mg daily to 5 mg daily (that would be 2 pills).  Please check BP daily for 5 days and share with your MD.

## 2021-06-01 NOTE — TELEPHONE ENCOUNTER
Lab Results   Component Value Date    GLUCOSE 89 05/31/2021    BUN 16 05/31/2021    CREATININE 0.93 05/31/2021    EGFRIFNONA 57 (L) 05/31/2021    EGFRIFAFRI 59 (L) 05/26/2021    BCR 17.2 05/31/2021    K 3.9 05/31/2021    CO2 24.0 05/31/2021    CALCIUM 10.4 (H) 05/31/2021    PROTENTOTREF 7.4 05/26/2021    ALBUMIN 4.00 05/31/2021    LABIL2 1.5 05/26/2021    AST 24 05/31/2021    ALT 21 05/31/2021     Will watch renal labs----agree with ER doc to take Amlodipine 5mg----taking two of the 2.5mg -----is she doing this?  If she is, let me know and plan to add Lisinopril

## 2021-06-04 ENCOUNTER — OFFICE VISIT (OUTPATIENT)
Dept: NEUROLOGY | Facility: CLINIC | Age: 86
End: 2021-06-04

## 2021-06-04 ENCOUNTER — TELEPHONE (OUTPATIENT)
Dept: FAMILY MEDICINE CLINIC | Facility: CLINIC | Age: 86
End: 2021-06-04

## 2021-06-04 VITALS
HEIGHT: 63 IN | BODY MASS INDEX: 23.39 KG/M2 | WEIGHT: 132 LBS | SYSTOLIC BLOOD PRESSURE: 144 MMHG | OXYGEN SATURATION: 96 % | DIASTOLIC BLOOD PRESSURE: 80 MMHG | HEART RATE: 62 BPM

## 2021-06-04 DIAGNOSIS — Z86.73 HISTORY OF STROKE: ICD-10-CM

## 2021-06-04 DIAGNOSIS — R26.9 GAIT DISTURBANCE, POST-STROKE: ICD-10-CM

## 2021-06-04 DIAGNOSIS — I69.398 GAIT DISTURBANCE, POST-STROKE: ICD-10-CM

## 2021-06-04 DIAGNOSIS — F03.B0 MODERATE DEMENTIA WITHOUT BEHAVIORAL DISTURBANCE (HCC): Primary | ICD-10-CM

## 2021-06-04 PROCEDURE — 99215 OFFICE O/P EST HI 40 MIN: CPT | Performed by: NURSE PRACTITIONER

## 2021-06-04 RX ORDER — DONEPEZIL HYDROCHLORIDE 5 MG/1
TABLET, FILM COATED ORAL
Qty: 60 TABLET | Refills: 2 | Status: SHIPPED | OUTPATIENT
Start: 2021-06-04 | End: 2021-10-20

## 2021-06-04 NOTE — TELEPHONE ENCOUNTER
Caller: TANYA POSADA    Relationship: Other    Best call back number: [phone]      What was the call regarding: PATIENT SAW CARLY ON 05/26 AND HAD HER MEDICATIONS SENT IN (TANYA DOESN'T REMEMBER ALL OF THE MEDS) BUT STATES THERE WAS 5 DIFFERENT PRESCRIPTIONS. PATIENT'S INSURANCE HAD CHANGED AND SO DID THE PHARMACY. IT SHOULD GO TO [facility] [phone]. PATIENT IS ABOUT TO RUN OUT OF SOME OF THE MEDICATIONS.    PLEASE CALL AND ADVISE.     Do you require a callback: YES

## 2021-06-04 NOTE — PROGRESS NOTES
DOS: 2021  NAME: Yolis Ospina   : 10/25/1929  PCP: Morales Bauer MD       CC: Stroke follow-up    Neurological Problem and Interval History:  89 y.o. right-handed female with HTN, HLD, PAF, history of breast cancer s/p mastectomy/radiation , kidney cancer status post nephrectomy , thyroid dysfunction, and vitamin D deficiency who is being seen in follow-up today for stroke.  She is accompanied by her friend Palak. She was last seen by me in 2019.    The following history was reviewed and updated as appropriate:   The patient presented to [facility] in 2018 with word finding difficulty.  She was not on antiplatelet or anticoagulation however she was on Pravachol prior to her stroke.  MRI of the brain showed multiple small acute cortical infarct scattered in the left cerebral hemisphere.  MRA head neck showed complete or nearly complete occlusion of the left M1 segment with somewhat irregular contour suggesting the presence of acute intra-arterial thrombus.  Dr. Ling felt the patient most likely had undiagnosed paroxysmal atrial fibrillation and started the patient heparin drip.  TTE showed EF of 64% with normal left atrial size and volume.  Dr. Ling ultimately decided to empirically place the patient on Eliquis 5 mg twice daily and antiplatelet was stopped.  She was discharged on increased dose of pravastatin 40 mg daily.  Outpatient ZIO Patch showed some episodes of SVT and rare PVCs.  Eliquis was later decreased to 2.5 mg BID by her PCP.    She has seen Dr. Miller previously for PAF. She continues to take Eliquis 2.5 mg twice daily.    She had an ED visit in 2020 for some confusion and was felt to have dementia.  CT head was negative at that time.  She had another ED visit 2021 for elevated blood pressure with associated CP and slurred speech. SBP was over 200 systolic. She had not been taking her medications correctly at home per her friend  Palak.  Patient notes decreased appetite, has lost a couple pounds since the fall.    Patient lives alone and continues to drive though only short distances. She has not gotten lost or had any accidents. Patient indicates she has no issue paying bills but her friend indicates that she has had to help her balance her checkbook recently. She has 1 adult child, a daughter who lives in [place]. Her friend recently started putting her medications in a pillbox and making sure the patient is taking them due to recently missed medications.    The patient notes balance issues but no falls in the last year. She denies dizziness, lightheadedness, or peripheral neuropathy symptoms.     She does not smoke or drink. Family history significant for heart disease in her brother and multiple family members had various different cancers. No family history of stroke.  Review of Systems:        Review of Systems   Constitutional: Positive for appetite change (decreased). Negative for activity change and unexpected weight change.   HENT: Negative for facial swelling, trouble swallowing and voice change.    Eyes: Positive for photophobia. Negative for pain and visual disturbance.   Respiratory: Negative for chest tightness, shortness of breath and wheezing.    Cardiovascular: Negative for chest pain, palpitations and leg swelling.   Gastrointestinal: Negative for abdominal pain, nausea and vomiting.   Endocrine: Negative for cold intolerance and heat intolerance.   Musculoskeletal: Positive for gait problem. Negative for arthralgias, back pain, joint swelling, myalgias, neck pain and neck stiffness.   Neurological: Negative for dizziness, tremors, seizures, syncope, facial asymmetry, speech difficulty, weakness, light-headedness, numbness and headaches.   Hematological: Does not bruise/bleed easily.   Psychiatric/Behavioral: Positive for agitation and confusion. Negative for behavioral problems, decreased concentration, dysphoric mood,  hallucinations, self-injury, sleep disturbance and suicidal ideas. The patient is nervous/anxious (anxious). The patient is not hyperactive.    ROS completed by the MA was reviewed by me and I agree.      Current Outpatient Medications:   •  amLODIPine (NORVASC) 2.5 MG tablet, Take 1 tablet by mouth Daily. For BP, Disp: 90 tablet, Rfl: 1  •  apixaban (Eliquis) 2.5 MG tablet tablet, Take 1 tablet by mouth Every 12 (Twelve) Hours. For a fib, Disp: 90 tablet, Rfl: 1  •  busPIRone (BUSPAR) 5 MG tablet, Take 1 tablet by mouth Daily. For anxiety, Disp: 90 tablet, Rfl: 1  •  PARoxetine (PAXIL) 10 MG tablet, Take 1 tablet by mouth Every Morning. For stress, Disp: 90 tablet, Rfl: 1  •  pravastatin (Pravachol) 40 MG tablet, Take 1 tablet by mouth Daily. For cholesterol; new dose, Disp: 90 tablet, Rfl: 1      Review and Interpretation of Imaging: CT head images viewed by me, there is chronic small vessel disease and some atrophy noted within gold left hemisphere stroke but no obvious acute findings.  CT HEAD WITHOUT CONTRAST     HISTORY: Headache and slurred speech.     COMPARISON: 06/28/2020     TECHNIQUE: Axial CT imaging was obtained through brain. IV contrast was  administered.     FINDINGS:  No acute intracranial hemorrhage is seen. There is diffuse atrophy.  There is periventricular and deep white matter microangiopathic change.  There is no midline shift or mass effect. Old right basal ganglia  infarct is noted. The paranasal sinuses and mastoid air cells appear  clear.     IMPRESSION:  No acute intracranial findings.     Radiation dose reduction techniques were utilized, including automated  exposure control and exposure modulation based on body size.     This report was finalized on 5/31/2021 8:51 PM by Dr. Francisca Bonilla M.D.       Laboratory Results:             Lab Results   Component Value Date    HGBA1C 5.5 05/26/2021         Lab Results   Component Value Date    CHOL 161 12/28/2018         Lab Results  "  Component Value Date    HDL 71 05/26/2021    HDL 76 03/10/2020    HDL 57 12/28/2018         Lab Results   Component Value Date    LDL 71 05/26/2021    LDL 90 03/10/2020    LDL 80 12/28/2018         Lab Results   Component Value Date    TRIG 195 (H) 05/26/2021    TRIG 108 03/10/2020    TRIG 118 12/28/2018     No results found for: RPR  Lab Results   Component Value Date    TSH 4.570 (H) 05/26/2021     Lab Results   Component Value Date    NWFRYQJR99 454 05/26/2021     Vitals:    06/04/21 1441   BP: 144/80   BP Location: Left arm   Patient Position: Sitting   Cuff Size: Adult   Pulse: 62   SpO2: 96%   Weight: 59.9 kg (132 lb)   Height: 160 cm (63\")       Physical Examination:   General Appearance:   Well developed, well nourished, well groomed, alert, and cooperative.  HEENT: Normocephalic.  Neck and Spine: Normal range of motion.  Normal alignment. No mass or tenderness..  Cardiac: Regular rate and rhythm. No murmurs.  Peripheral Vasculature: Radial  pulses are equal and symmetric.   Extremities:    Trace edema ankles bilaterally.  Skin:    No rashes or birth marks.    Neurological examination:  Higher Integrative  Function: Oriented 5 out of 6 on the [place]. MoCA score was 15 out of 30. She was only able to name 4F words in 1 minute and delayed recall was 0 out of 5. Speech fluent normal conversation, no dysarthria.  CN II: Pupils are equal, round, and reactive to light. Normal  visual fields.    CN III IV VI: Extraocular movements are full without nystagmus.   CN V: Normal facial sensation and strength of muscles of mastication.  CN VII: Facial movements are symmetric. No weakness.  CN VIII:   Auditory acuity is normal.  CN IX & X:   Symmetric palatal movement.  CN XI: Sternocleidomastoid and trapezius are normal.  No weakness.  CN XII:   The tongue is midline.  No atrophy or fasciculations.  Motor: Normal muscle strength, bulk and tone in upper and lower extremities.  No fasciculations, rigidity, spasticity, or " abnormal movements.  Reflexes: 1+ in the upper and lower extremities.   Sensation: Normal to light touch, pinprick, vibration.  Station and Gait: Mildly unsteady.  Coordination: Finger to nose test shows no dysmetria.  Rapid alternating movements are normal.  Heel to shin normal.    Diagnoses / Discussion:    Moderate dementia  History of left MCA stroke  PAF, anticoagulated  Gait disturbance  Plan:   Continue eliquis 2.5 mg BID   Start aricept 5 mg nightly x 30 days then 10 mg nightly. Reviewed that effects with patient and Palak.   Recommend no further driving.   Health PT to help with gait and balance.   Blood pressure control to <130/80   Goal LDL <70 continue pravachol 40 mg nightly    Serum glucose < 140   Call 911 for stroke any stroke symptoms   Did call and discuss my concerns and findings with patient's daughter Teresa. She is coming in town soon and is looking into taking  over patient's finances and looking at her home situation for safety. I discussed with her that I recommend no further driving in the  patient and she verbalized understanding.   Follow-up in 3 months or sooner if needed.       Greater than 40 minutes spent in review of chart, discussion/examination of patient, discussion with patient's daughter via telephone, and documentation.

## 2021-06-04 NOTE — TELEPHONE ENCOUNTER
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 Thanks

## 2021-06-04 NOTE — PATIENT INSTRUCTIONS
Start Aricept 5 mg nightly for 30 days then increase to 10 mg nightly. Side effects included dizziness and diarrhea.   Someone should call to schedule in home physical therapy, if not, let me know.

## 2021-06-05 ENCOUNTER — HOME HEALTH ADMISSION (OUTPATIENT)
Dept: HOME HEALTH SERVICES | Facility: HOME HEALTHCARE | Age: 86
End: 2021-06-05

## 2021-06-08 ENCOUNTER — TELEPHONE (OUTPATIENT)
Dept: FAMILY MEDICINE CLINIC | Facility: CLINIC | Age: 86
End: 2021-06-08

## 2021-06-08 NOTE — TELEPHONE ENCOUNTER
PATIENT DAUGHTER NEEDS A CALL TO REVIEW PATIENTS LAST VISIT. DAUGHTER IS FLYING IN FOR A VISIT IN 2 WEEKS AND IS TRYING TO GET HER MOTHERS HEALTH CARE IN ORDER.    PLEASE CALL: [phone] ([id number])

## 2021-06-09 ENCOUNTER — TELEPHONE (OUTPATIENT)
Dept: FAMILY MEDICINE CLINIC | Facility: CLINIC | Age: 86
End: 2021-06-09

## 2021-06-09 RX ORDER — LISINOPRIL 10 MG/1
10 TABLET ORAL DAILY
Qty: 90 TABLET | Refills: 0 | Status: SHIPPED | OUTPATIENT
Start: 2021-06-09 | End: 2021-11-29

## 2021-06-09 NOTE — TELEPHONE ENCOUNTER
Patient went to the emergency room a few days after she saw me and they increased her amlodipine to 5 mg.  In my call notes I have that I can add lisinopril to amlodipine if her blood pressures not controlled on the 5 mg.  I was never updated on current blood pressure readings?  And patient has not done follow-up appointment since she went to the ER

## 2021-06-09 NOTE — TELEPHONE ENCOUNTER
PATIENT'S DAUGHTER CALLED TO UPDATE THE FAX NUMBER FOR THE PATIENT'S MEDICAL RECORDS.    FAX: [phone]

## 2021-06-09 NOTE — TELEPHONE ENCOUNTER
I talked with daughter. She states that her BP in the AM is been 162/85. She has scheduled a follow up appointment for 6/22/21. The daughter will be in town and will bring her to the appointment. I have place the lisinopril please send.

## 2021-06-10 ENCOUNTER — TELEPHONE (OUTPATIENT)
Dept: FAMILY MEDICINE CLINIC | Facility: CLINIC | Age: 86
End: 2021-06-10

## 2021-06-10 NOTE — TELEPHONE ENCOUNTER
Caller: Yolis Ospina    Relationship: Self    Best call back number: [phone]    What is the best time to reach you: ANY    What was the call regarding: PATIENT RECEIVED A CALL YESTERDAY, NO MESSAGE LEFT AND NO NOTES. PLEASE CALL PATIENT BACK IF NEEDED.     Do you require a callback: YES

## 2021-06-14 DIAGNOSIS — F41.9 ANXIETY: ICD-10-CM

## 2021-06-15 RX ORDER — BUSPIRONE HYDROCHLORIDE 5 MG/1
TABLET ORAL
Qty: 30 TABLET | Refills: 0 | Status: SHIPPED | OUTPATIENT
Start: 2021-06-15 | End: 2021-07-12

## 2021-06-15 RX ORDER — PAROXETINE 10 MG/1
10 TABLET, FILM COATED ORAL EVERY MORNING
Qty: 30 TABLET | Refills: 0 | Status: SHIPPED | OUTPATIENT
Start: 2021-06-15 | End: 2021-07-12

## 2021-06-16 RX ORDER — ALPRAZOLAM 0.25 MG/1
TABLET ORAL
Qty: 30 TABLET | OUTPATIENT
Start: 2021-06-16

## 2021-07-09 ENCOUNTER — TELEPHONE (OUTPATIENT)
Dept: FAMILY MEDICINE CLINIC | Facility: CLINIC | Age: 86
End: 2021-07-09

## 2021-07-09 NOTE — TELEPHONE ENCOUNTER
Caller: Yolis Ospina    Relationship: Self    Best call back number: [phone]    What medications are you currently taking:   Current Outpatient Medications on File Prior to Visit   Medication Sig Dispense Refill   • amLODIPine (NORVASC) 2.5 MG tablet Take 1 tablet by mouth Daily. For BP (Patient taking differently: Take 2.5 mg by mouth Daily. For BP. May repeat 2nd dose pm if bp indicates) 90 tablet 1   • apixaban (Eliquis) 2.5 MG tablet tablet Take 1 tablet by mouth Every 12 (Twelve) Hours. For a fib 90 tablet 1   • busPIRone (BUSPAR) 5 MG tablet TAKE 1 TABLET BY MOUTH DAILY. 30 tablet 0   • Cyanocobalamin (VITAMIN B-12 PO) Take  by mouth.     • donepezil (Aricept) 5 MG tablet Take 5 mg nightly for 30 days then increase to 10 mg nightly 60 tablet 2   • lisinopril (PRINIVIL,ZESTRIL) 10 MG tablet Take 1 tablet by mouth Daily. 90 tablet 0   • PARoxetine (PAXIL) 10 MG tablet TAKE 1 TABLET BY MOUTH EVERY MORNING FOR STRESS. 30 tablet 0   • pravastatin (Pravachol) 40 MG tablet Take 1 tablet by mouth Daily. For cholesterol; new dose 90 tablet 1     No current facility-administered medications on file prior to visit.        PATIENT IS UNSURE WHICH MEDICATION IS CAUSING THE PROBLEMS BUT SHE IS HAVING CONSTANT DIARRHEA AND CANNOT DETERMINE WHICH MEDICATION MAY BE CAUSING IT. PATIENT REQUESTING TO SPEAK TO CARLY [facility]BR[facility]'S MEDICAL ASSISTANT.

## 2021-07-12 DIAGNOSIS — F41.9 ANXIETY: ICD-10-CM

## 2021-07-12 RX ORDER — PAROXETINE 10 MG/1
10 TABLET, FILM COATED ORAL EVERY MORNING
Qty: 30 TABLET | Refills: 0 | Status: SHIPPED | OUTPATIENT
Start: 2021-07-12 | End: 2021-08-09

## 2021-07-12 RX ORDER — BUSPIRONE HYDROCHLORIDE 5 MG/1
TABLET ORAL
Qty: 30 TABLET | Refills: 0 | Status: SHIPPED | OUTPATIENT
Start: 2021-07-12 | End: 2021-08-09

## 2021-07-28 DIAGNOSIS — I10 ESSENTIAL HYPERTENSION: ICD-10-CM

## 2021-07-28 RX ORDER — PRAVASTATIN SODIUM 40 MG
40 TABLET ORAL DAILY
Qty: 90 TABLET | Refills: 1 | OUTPATIENT
Start: 2021-07-28

## 2021-07-28 RX ORDER — AMLODIPINE BESYLATE 2.5 MG/1
2.5 TABLET ORAL DAILY
Qty: 90 TABLET | Refills: 1 | OUTPATIENT
Start: 2021-07-28

## 2021-07-29 DIAGNOSIS — I10 ESSENTIAL HYPERTENSION: ICD-10-CM

## 2021-07-29 RX ORDER — AMLODIPINE BESYLATE 2.5 MG/1
2.5 TABLET ORAL DAILY
Qty: 90 TABLET | Refills: 1 | OUTPATIENT
Start: 2021-07-29

## 2021-07-29 RX ORDER — PRAVASTATIN SODIUM 40 MG
40 TABLET ORAL DAILY
Qty: 90 TABLET | Refills: 1 | OUTPATIENT
Start: 2021-07-29

## 2021-07-30 RX ORDER — PRAVASTATIN SODIUM 40 MG
40 TABLET ORAL DAILY
Qty: 30 TABLET | OUTPATIENT
Start: 2021-07-30

## 2021-08-09 DIAGNOSIS — F41.9 ANXIETY: ICD-10-CM

## 2021-08-09 RX ORDER — PAROXETINE 10 MG/1
10 TABLET, FILM COATED ORAL EVERY MORNING
Qty: 30 TABLET | Refills: 0 | Status: SHIPPED | OUTPATIENT
Start: 2021-08-09 | End: 2021-09-08

## 2021-08-09 RX ORDER — BUSPIRONE HYDROCHLORIDE 5 MG/1
TABLET ORAL
Qty: 30 TABLET | Refills: 0 | Status: SHIPPED | OUTPATIENT
Start: 2021-08-09 | End: 2021-09-08

## 2021-08-13 ENCOUNTER — TELEPHONE (OUTPATIENT)
Dept: FAMILY MEDICINE CLINIC | Facility: CLINIC | Age: 86
End: 2021-08-13

## 2021-08-13 ENCOUNTER — OFFICE VISIT (OUTPATIENT)
Dept: FAMILY MEDICINE CLINIC | Facility: CLINIC | Age: 86
End: 2021-08-13

## 2021-08-13 VITALS
OXYGEN SATURATION: 97 % | BODY MASS INDEX: 23.04 KG/M2 | WEIGHT: 130 LBS | HEART RATE: 72 BPM | TEMPERATURE: 97.3 F | RESPIRATION RATE: 16 BRPM | HEIGHT: 63 IN

## 2021-08-13 DIAGNOSIS — R25.2 LEG CRAMPS: Primary | ICD-10-CM

## 2021-08-13 PROCEDURE — 99213 OFFICE O/P EST LOW 20 MIN: CPT | Performed by: NURSE PRACTITIONER

## 2021-08-13 RX ORDER — PRAVASTATIN SODIUM 40 MG
40 TABLET ORAL DAILY
Qty: 90 TABLET | Refills: 1 | Status: SHIPPED | OUTPATIENT
Start: 2021-08-13 | End: 2021-11-03

## 2021-08-13 NOTE — TELEPHONE ENCOUNTER
Caller: Teresa Villalpando (POA)    Relationship: Emergency Contact    Best call back number: [phone]    Medication needed:   Requested Prescriptions     Pending Prescriptions Disp Refills   • pravastatin (Pravachol) 40 MG tablet 90 tablet 1     Sig: Take 1 tablet by mouth Daily. For cholesterol; new dose       When do you need the refill by: ASAP    What additional details did the patient provide when requesting the medication: PATIENT WAS NOT SURE IF SHE SHOULD STILL BE TAKING IT, PLEASE ADVISE.     Does the patient have less than a 3 day supply:  [x] Yes  [] No    What is the patient's [facility] [address] - [phone]  - [phone] FX

## 2021-08-13 NOTE — PROGRESS NOTES
Subjective   Yolis Ospina is a 89 y.o. female.     History of Present Illness   Patient complains of bilateral leg pain. The symptoms began yesterday.  Pain is a result of no known event. Reports cramping in both legs at night. States it improved when she got up.  She states she only has one kidney so has not been taking in a lot of fluids.  She has not had symptoms like that prior.  Denies pain currently.       The following portions of the patient's history were reviewed and updated as appropriate: allergies, current medications, past family history, past medical history, past social history, past surgical history and problem list.    Review of Systems   Constitutional: Negative for unexpected weight change.   Respiratory: Negative for shortness of breath.    Cardiovascular: Negative for chest pain and palpitations.   Musculoskeletal: Negative for joint swelling.   Neurological: Negative for weakness.   Psychiatric/Behavioral: Negative for behavioral problems.       Objective   Physical Exam  Vitals and nursing note reviewed.   Constitutional:       Appearance: Normal appearance. She is well-developed.   Cardiovascular:      Rate and Rhythm: Normal rate and regular rhythm.   Pulmonary:      Effort: Pulmonary effort is normal.      Breath sounds: Normal breath sounds.   Musculoskeletal:      Right lower leg: No tenderness.      Left lower leg: No tenderness.   Neurological:      Mental Status: She is alert and oriented to person, place, and time.   Psychiatric:         Mood and Affect: Mood normal.         Behavior: Behavior normal.         Thought Content: Thought content normal.         Judgment: Judgment normal.         Assessment/Plan   Diagnoses and all orders for this visit:    1. Leg cramps (Primary)  -     Magnesium  -     Basic metabolic panel  -     Vitamin B12

## 2021-08-13 NOTE — TELEPHONE ENCOUNTER
Caller: Teresa Villalpando (POA)    Relationship: Emergency Contact    Best call back number: [phone]    What was the call regarding: PATIENT WOKE UP THIS MORNING WITH HER LEGS IN A LOT OF PAIN. FROM KNEE DOWN THEY ARE CRAMPING AND KEPT HER UP ALL NIGHT. PLEASE ADVISE ON WHAT TO DO TO HELP OR IF SHE SHOULD COME IN FOR AN APPT.     Do you require a callback: YES PLEASE CALL DAUGHTER

## 2021-08-14 LAB
BUN SERPL-MCNC: 18 MG/DL (ref 8–23)
BUN/CREAT SERPL: 18.9 (ref 7–25)
CALCIUM SERPL-MCNC: 11.7 MG/DL (ref 8.2–9.6)
CHLORIDE SERPL-SCNC: 108 MMOL/L (ref 98–107)
CO2 SERPL-SCNC: 27.2 MMOL/L (ref 22–29)
CREAT SERPL-MCNC: 0.95 MG/DL (ref 0.57–1)
GLUCOSE SERPL-MCNC: 129 MG/DL (ref 65–99)
MAGNESIUM SERPL-MCNC: 2.3 MG/DL (ref 1.7–2.3)
POTASSIUM SERPL-SCNC: 4.4 MMOL/L (ref 3.5–5.2)
SODIUM SERPL-SCNC: 143 MMOL/L (ref 136–145)
VIT B12 SERPL-MCNC: 580 PG/ML (ref 211–946)

## 2021-08-17 ENCOUNTER — TELEPHONE (OUTPATIENT)
Dept: FAMILY MEDICINE CLINIC | Facility: CLINIC | Age: 86
End: 2021-08-17

## 2021-08-17 NOTE — TELEPHONE ENCOUNTER
Caller: Yolis Ospina    Relationship: Self    Best call back number: [phone] (H)    What test was performed: BLOOD WORK    When was the test performed: 08/13/21    Where was the test performed: [facility]    Additional notes:

## 2021-09-08 ENCOUNTER — TELEPHONE (OUTPATIENT)
Dept: NEUROLOGY | Facility: CLINIC | Age: 86
End: 2021-09-08

## 2021-09-08 DIAGNOSIS — F41.9 ANXIETY: ICD-10-CM

## 2021-09-08 RX ORDER — PAROXETINE 10 MG/1
10 TABLET, FILM COATED ORAL EVERY MORNING
Qty: 30 TABLET | Refills: 0 | Status: SHIPPED | OUTPATIENT
Start: 2021-09-08 | End: 2021-10-08

## 2021-09-08 RX ORDER — BUSPIRONE HYDROCHLORIDE 5 MG/1
TABLET ORAL
Qty: 30 TABLET | Refills: 0 | Status: SHIPPED | OUTPATIENT
Start: 2021-09-08 | End: 2021-10-08

## 2021-09-08 NOTE — TELEPHONE ENCOUNTER
Caller: ROSA    Best call back number:[phone]    What was the call regarding: RX /DIARRHEA    Do you require a callback: YES      PT IS CALLING STATES THAT SHE STARTED TAKING 2 TAB OF THE DONEPEZIL AND AFTER A FEW WEEKS SHE HAS DIARRHEA. IT GOES AWAY BUT COMES RIGHT BACK . SOMETIMES IT IS BAD.     PLEASE CALL AND ADVISE .

## 2021-09-08 NOTE — TELEPHONE ENCOUNTER
Patient returned Tracy's call.  Reviewed ALIRIO Mata's recommendations to try Donepezil 1 tablet BID and that if diarrhea does not improve, she could go back to taking 1 tablet qhs.  Patient did repeat directions back to me but did need clarification multiple times.

## 2021-09-08 NOTE — TELEPHONE ENCOUNTER
She can try taking 1 tablet twice daily. If diarrhea doesn't improve she can go back top taking 1 tablet nightly.

## 2021-10-08 DIAGNOSIS — F41.9 ANXIETY: ICD-10-CM

## 2021-10-08 RX ORDER — BUSPIRONE HYDROCHLORIDE 5 MG/1
TABLET ORAL
Qty: 30 TABLET | Refills: 0 | Status: SHIPPED | OUTPATIENT
Start: 2021-10-08 | End: 2021-11-03

## 2021-10-08 RX ORDER — PAROXETINE 10 MG/1
10 TABLET, FILM COATED ORAL EVERY MORNING
Qty: 30 TABLET | Refills: 0 | Status: SHIPPED | OUTPATIENT
Start: 2021-10-08 | End: 2021-11-03

## 2021-10-20 RX ORDER — DONEPEZIL HYDROCHLORIDE 5 MG/1
TABLET, FILM COATED ORAL
Qty: 60 TABLET | Refills: 2 | Status: SHIPPED | OUTPATIENT
Start: 2021-10-20 | End: 2022-02-02

## 2021-10-28 ENCOUNTER — TELEPHONE (OUTPATIENT)
Dept: NEUROLOGY | Facility: CLINIC | Age: 86
End: 2021-10-28

## 2021-10-28 NOTE — TELEPHONE ENCOUNTER
Provider: URIAH JOHNSON  Caller: KHUSHBOO CAREY  Relationship to Patient: DAUGHTER/POA  Pharmacy: NA  Phone Number: [phone]  Reason for Call:   Patients daughter states that she would like to talk to uriah about jaylen' medication before she comes in for her next appointment. Please advise.   When was the patient last seen: 6-4-21

## 2021-10-28 NOTE — TELEPHONE ENCOUNTER
I spoke with Teresa, Patients Daughter regarding Mrs. Ospina's medication. She was prescribed aricept 5mg twice a day but the daughter states that it is making her nauseated, so she started giving her 5 mg once a day but this is not working as well. She was wanting to know if there was another medication that she can try? She has an appointment with you 11/4/2021. Please advise.      Thank You

## 2021-10-29 ENCOUNTER — TELEPHONE (OUTPATIENT)
Dept: NEUROLOGY | Facility: CLINIC | Age: 86
End: 2021-10-29

## 2021-10-29 NOTE — TELEPHONE ENCOUNTER
I just spoke to Teresa Villalpando regarding her mother's medication and per Jesi Crawford it is ok to withhold her Aricept until her follow up appointment on 11/2/21 which they will discuss other options.

## 2021-11-02 ENCOUNTER — OFFICE VISIT (OUTPATIENT)
Dept: NEUROLOGY | Facility: CLINIC | Age: 86
End: 2021-11-02

## 2021-11-02 VITALS
OXYGEN SATURATION: 98 % | SYSTOLIC BLOOD PRESSURE: 136 MMHG | BODY MASS INDEX: 23.03 KG/M2 | HEART RATE: 65 BPM | DIASTOLIC BLOOD PRESSURE: 68 MMHG | WEIGHT: 130 LBS

## 2021-11-02 DIAGNOSIS — F03.B0 MODERATE DEMENTIA WITHOUT BEHAVIORAL DISTURBANCE (HCC): Primary | ICD-10-CM

## 2021-11-02 DIAGNOSIS — Z86.73 HISTORY OF STROKE: ICD-10-CM

## 2021-11-02 PROCEDURE — 99214 OFFICE O/P EST MOD 30 MIN: CPT | Performed by: NURSE PRACTITIONER

## 2021-11-02 RX ORDER — MEMANTINE HYDROCHLORIDE 10 MG/1
10 TABLET ORAL 2 TIMES DAILY
Qty: 180 TABLET | Refills: 1 | Status: SHIPPED | OUTPATIENT
Start: 2021-12-02 | End: 2022-05-24

## 2021-11-02 RX ORDER — MEMANTINE HYDROCHLORIDE 5 MG-10 MG
KIT ORAL
Qty: 1 EACH | Refills: 0 | Status: SHIPPED | OUTPATIENT
Start: 2021-11-02 | End: 2022-05-05 | Stop reason: SDUPTHER

## 2021-11-02 RX ORDER — FLUOROMETHOLONE 0.1 %
SUSPENSION, DROPS(FINAL DOSAGE FORM)(ML) OPHTHALMIC (EYE)
COMMUNITY
Start: 2021-10-14

## 2021-11-02 NOTE — PATIENT INSTRUCTIONS
Restart aricept 5 mg nightly. Wait a week before starting the other medication.    Start namenda, titration pack, gradually increase from 5 mg nightly to 10 mg twice daily. First month is titration pack, after titration pack compete start other prescription, 10 mg twice a daily

## 2021-11-03 DIAGNOSIS — F41.9 ANXIETY: ICD-10-CM

## 2021-11-03 RX ORDER — BUSPIRONE HYDROCHLORIDE 5 MG/1
TABLET ORAL
Qty: 30 TABLET | Refills: 0 | Status: SHIPPED | OUTPATIENT
Start: 2021-11-03 | End: 2021-11-29 | Stop reason: SDUPTHER

## 2021-11-03 RX ORDER — PAROXETINE 10 MG/1
10 TABLET, FILM COATED ORAL EVERY MORNING
Qty: 30 TABLET | Refills: 0 | Status: SHIPPED | OUTPATIENT
Start: 2021-11-03 | End: 2021-11-29 | Stop reason: SDUPTHER

## 2021-11-03 RX ORDER — PRAVASTATIN SODIUM 40 MG
TABLET ORAL
Qty: 90 TABLET | Refills: 1 | Status: SHIPPED | OUTPATIENT
Start: 2021-11-03 | End: 2022-05-23 | Stop reason: SDUPTHER

## 2021-11-29 ENCOUNTER — OFFICE VISIT (OUTPATIENT)
Dept: FAMILY MEDICINE CLINIC | Facility: CLINIC | Age: 86
End: 2021-11-29

## 2021-11-29 VITALS
RESPIRATION RATE: 16 BRPM | WEIGHT: 130 LBS | TEMPERATURE: 97.5 F | HEART RATE: 70 BPM | BODY MASS INDEX: 23.04 KG/M2 | DIASTOLIC BLOOD PRESSURE: 80 MMHG | OXYGEN SATURATION: 98 % | SYSTOLIC BLOOD PRESSURE: 152 MMHG | HEIGHT: 63 IN

## 2021-11-29 DIAGNOSIS — E03.9 ACQUIRED HYPOTHYROIDISM: ICD-10-CM

## 2021-11-29 DIAGNOSIS — Z86.79 HISTORY OF ATRIAL FIBRILLATION: ICD-10-CM

## 2021-11-29 DIAGNOSIS — F41.9 ANXIETY: ICD-10-CM

## 2021-11-29 DIAGNOSIS — I10 ESSENTIAL HYPERTENSION: ICD-10-CM

## 2021-11-29 DIAGNOSIS — Z86.73 HISTORY OF STROKE: Primary | ICD-10-CM

## 2021-11-29 PROCEDURE — 1170F FXNL STATUS ASSESSED: CPT | Performed by: PHYSICIAN ASSISTANT

## 2021-11-29 PROCEDURE — 99214 OFFICE O/P EST MOD 30 MIN: CPT | Performed by: PHYSICIAN ASSISTANT

## 2021-11-29 PROCEDURE — 1160F RVW MEDS BY RX/DR IN RCRD: CPT | Performed by: PHYSICIAN ASSISTANT

## 2021-11-29 PROCEDURE — G0439 PPPS, SUBSEQ VISIT: HCPCS | Performed by: PHYSICIAN ASSISTANT

## 2021-11-29 RX ORDER — BUSPIRONE HYDROCHLORIDE 5 MG/1
TABLET ORAL
Qty: 60 TABLET | Refills: 11 | Status: SHIPPED | OUTPATIENT
Start: 2021-11-29 | End: 2022-11-28 | Stop reason: SDUPTHER

## 2021-11-29 RX ORDER — PAROXETINE 10 MG/1
10 TABLET, FILM COATED ORAL EVERY MORNING
Qty: 90 TABLET | Refills: 3 | Status: SHIPPED | OUTPATIENT
Start: 2021-11-29 | End: 2022-11-17

## 2021-11-29 RX ORDER — AMLODIPINE BESYLATE 5 MG/1
5 TABLET ORAL DAILY
Qty: 30 TABLET | Refills: 1 | Status: SHIPPED | OUTPATIENT
Start: 2021-11-29 | End: 2022-01-26

## 2021-11-29 NOTE — PATIENT INSTRUCTIONS
"Hypertension, Adult  High blood pressure (hypertension) is when the force of blood pumping through the arteries is too strong. The arteries are the blood vessels that carry blood from the heart throughout the body. Hypertension forces the heart to work harder to pump blood and may cause arteries to become narrow or stiff. Untreated or uncontrolled hypertension can cause a heart attack, heart failure, a stroke, kidney disease, and other problems.  A blood pressure reading consists of a higher number over a lower number. Ideally, your blood pressure should be below 120/80. The first (\"top\") number is called the systolic pressure. It is a measure of the pressure in your arteries as your heart beats. The second (\"bottom\") number is called the diastolic pressure. It is a measure of the pressure in your arteries as the heart relaxes.  What are the causes?  The exact cause of this condition is not known. There are some conditions that result in or are related to high blood pressure.  What increases the risk?  Some risk factors for high blood pressure are under your control. The following factors may make you more likely to develop this condition:  · Smoking.  · Having type 2 diabetes mellitus, high cholesterol, or both.  · Not getting enough exercise or physical activity.  · Being overweight.  · Having too much fat, sugar, calories, or salt (sodium) in your diet.  · Drinking too much alcohol.  Some risk factors for high blood pressure may be difficult or impossible to change. Some of these factors include:  · Having chronic kidney disease.  · Having a family history of high blood pressure.  · Age. Risk increases with age.  · Race. You may be at higher risk if you are .  · Gender. Men are at higher risk than women before age 45. After age 65, women are at higher risk than men.  · Having obstructive sleep apnea.  · Stress.  What are the signs or symptoms?  High blood pressure may not cause symptoms. Very high " blood pressure (hypertensive crisis) may cause:  · Headache.  · Anxiety.  · Shortness of breath.  · Nosebleed.  · Nausea and vomiting.  · Vision changes.  · Severe chest pain.  · Seizures.  How is this diagnosed?  This condition is diagnosed by measuring your blood pressure while you are seated, with your arm resting on a flat surface, your legs uncrossed, and your feet flat on the floor. The cuff of the blood pressure monitor will be placed directly against the skin of your upper arm at the level of your heart. It should be measured at least twice using the same arm. Certain conditions can cause a difference in blood pressure between your right and left arms.  Certain factors can cause blood pressure readings to be lower or higher than normal for a short period of time:  · When your blood pressure is higher when you are in a health care provider's office than when you are at home, this is called white coat hypertension. Most people with this condition do not need medicines.  · When your blood pressure is higher at home than when you are in a health care provider's office, this is called masked hypertension. Most people with this condition may need medicines to control blood pressure.  If you have a high blood pressure reading during one visit or you have normal blood pressure with other risk factors, you may be asked to:  · Return on a different day to have your blood pressure checked again.  · Monitor your blood pressure at home for 1 week or longer.  If you are diagnosed with hypertension, you may have other blood or imaging tests to help your health care provider understand your overall risk for other conditions.  How is this treated?  This condition is treated by making healthy lifestyle changes, such as eating healthy foods, exercising more, and reducing your alcohol intake. Your health care provider may prescribe medicine if lifestyle changes are not enough to get your blood pressure under control, and  if:  · Your systolic blood pressure is above 130.  · Your diastolic blood pressure is above 80.  Your personal target blood pressure may vary depending on your medical conditions, your age, and other factors.  Follow these instructions at home:  Eating and drinking    · Eat a diet that is high in fiber and potassium, and low in sodium, added sugar, and fat. An example eating plan is called the DASH (Dietary Approaches to Stop Hypertension) diet. To eat this way:  ? Eat plenty of fresh fruits and vegetables. Try to fill one half of your plate at each meal with fruits and vegetables.  ? Eat whole grains, such as whole-wheat pasta, brown rice, or whole-grain bread. Fill about one fourth of your plate with whole grains.  ? Eat or drink low-fat dairy products, such as skim milk or low-fat yogurt.  ? Avoid fatty cuts of meat, processed or cured meats, and poultry with skin. Fill about one fourth of your plate with lean proteins, such as fish, chicken without skin, beans, eggs, or tofu.  ? Avoid pre-made and processed foods. These tend to be higher in sodium, added sugar, and fat.  · Reduce your daily sodium intake. Most people with hypertension should eat less than 1,500 mg of sodium a day.  · Do not drink alcohol if:  ? Your health care provider tells you not to drink.  ? You are pregnant, may be pregnant, or are planning to become pregnant.  · If you drink alcohol:  ? Limit how much you use to:  § 0-1 drink a day for women.  § 0-2 drinks a day for men.  ? Be aware of how much alcohol is in your drink. In the [place] one drink equals one 12 oz bottle of beer (355 mL), one 5 oz glass of wine (148 mL), or one 1½ oz glass of hard liquor (44 mL).    Lifestyle    · Work with your health care provider to maintain a healthy body weight or to lose weight. Ask what an ideal weight is for you.  · Get at least 30 minutes of exercise most days of the week. Activities may include walking, swimming, or biking.  · Include exercise to  strengthen your muscles (resistance exercise), such as Pilates or lifting weights, as part of your weekly exercise routine. Try to do these types of exercises for 30 minutes at least 3 days a week.  · Do not use any products that contain nicotine or tobacco, such as cigarettes, e-cigarettes, and chewing tobacco. If you need help quitting, ask your health care provider.  · Monitor your blood pressure at home as told by your health care provider.  · Keep all follow-up visits as told by your health care provider. This is important.    Medicines  · Take over-the-counter and prescription medicines only as told by your health care provider. Follow directions carefully. Blood pressure medicines must be taken as prescribed.  · Do not skip doses of blood pressure medicine. Doing this puts you at risk for problems and can make the medicine less effective.  · Ask your health care provider about side effects or reactions to medicines that you should watch for.  Contact a health care provider if you:  · Think you are having a reaction to a medicine you are taking.  · Have headaches that keep coming back (recurring).  · Feel dizzy.  · Have swelling in your ankles.  · Have trouble with your vision.  Get help right away if you:  · Develop a severe headache or confusion.  · Have unusual weakness or numbness.  · Feel faint.  · Have severe pain in your chest or abdomen.  · Vomit repeatedly.  · Have trouble breathing.  Summary  · Hypertension is when the force of blood pumping through your arteries is too strong. If this condition is not controlled, it may put you at risk for serious complications.  · Your personal target blood pressure may vary depending on your medical conditions, your age, and other factors. For most people, a normal blood pressure is less than 120/80.  · Hypertension is treated with lifestyle changes, medicines, or a combination of both. Lifestyle changes include losing weight, eating a healthy, low-sodium diet,  exercising more, and limiting alcohol.  This information is not intended to replace advice given to you by your health care provider. Make sure you discuss any questions you have with your health care provider.  Document Revised: 2019 Document Reviewed: 2019  [employer] Patient Education ©  [employer]      Medicare Wellness  Personal Prevention Plan of Service     Date of Office Visit:  2021  Encounter Provider:  Tonya Malcolm PA-C  Place of Service:  [facility]  Patient Name: Yolis Ospina  :  10/25/1929    As part of the Medicare Wellness portion of your visit today, we are providing you with this personalized preventive plan of services (PPPS). This plan is based upon recommendations of the [place] Preventive Services Task Force (USPSTF) and the Advisory Committee on Immunization Practices (ACIP).    This lists the preventive care services that should be considered, and provides dates of when you are due. Items listed as completed are up-to-date and do not require any further intervention.    Health Maintenance   Topic Date Due   • Pneumococcal Vaccine 65+ (1 of 1 - PPSV23) Never done   • DXA SCAN  2015   • ZOSTER VACCINE (2 of 2) 2017   • MAMMOGRAM  2019   • COVID-19 Vaccine (3 - Booster for Moderna series) 2021   • LIPID PANEL  2022   • ANNUAL WELLNESS VISIT  2022   • TDAP/TD VACCINES (2 - Td or Tdap) 2027   • INFLUENZA VACCINE  Completed       Orders Placed This Encounter   Procedures   • Ambulatory Referral to Cardiology     Referral Priority:   Routine     Referral Type:   Consultation     Referral Reason:   Specialty Services Required     Referred to Provider:   Cornel Birch MD     Requested Specialty:   Cardiology     Number of Visits Requested:   1       Return in about 2 weeks (around 2021).

## 2021-11-29 NOTE — PROGRESS NOTES
"Subjective   Yolis Ospina is a 89 y.o. female.     History of Present Illness    Since the last visit, she has overall felt fairly well.  She has Essential Hypertension not at goal, plan to add/adjust medication, Hyperlipidemia with goals met with current Rx and Vitamin D deficiency and labs are at goal >30 ng/mL.  she has been compliant with current medications have reviewed them.  The patient denies medication side effects.  Will refill medications. /80   Pulse 70   Temp 97.5 °F (36.4 °C)   Resp 16   Ht 160 cm (62.99\")   Wt 59 kg (130 lb)   LMP  (LMP Unknown)   SpO2 98%   BMI 23.03 kg/m²   She is not taking Lisinopril---d/t was not taking Amlodipine when I saw her  Lives alone---here with [place]--friend  Est CC 35  Home bp 146-150/70s  Results for orders placed or performed in visit on 08/13/21   Magnesium    Specimen: Blood   Result Value Ref Range    Magnesium 2.3 1.7 - 2.3 mg/dL   Basic metabolic panel    Specimen: Blood   Result Value Ref Range    Glucose 129 (H) 65 - 99 mg/dL    BUN 18 8 - 23 mg/dL    Creatinine 0.95 0.57 - 1.00 mg/dL    eGFR Non African Am 55 (L) >60 mL/min/1.73    eGFR African Am 67 >60 mL/min/1.73    BUN/Creatinine Ratio 18.9 7.0 - 25.0    Sodium 143 136 - 145 mmol/L    Potassium 4.4 3.5 - 5.2 mmol/L    Chloride 108 (H) 98 - 107 mmol/L    Total CO2 27.2 22.0 - 29.0 mmol/L    Calcium 11.7 (H) 8.2 - 9.6 mg/dL   Vitamin B12    Specimen: Blood   Result Value Ref Range    Vitamin B-12 580 211 - 946 pg/mL       Also note patient has history of hyperparathyroidism with PTH level intact 5/26/2021 of 116 and continued elevation of calcium levels on labs.  Note that patient refuses referral to ENT Dr. Angel for further work-up and treatment.    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 right-handed female with HTN, " HLD, PAF on Eliquis 2.5 mg twice daily, history of breast cancer status post mastectomy/radiation 2008, kidney cancer status post nephrectomy in 1991, thyroid dysfunction, and vitamin D deficiency who is being seen in follow-up today for stroke and dementia.  Noted her stroke 2018 with brain MRI reported multiple small acute cortical infarct scattered in left cerebral hemisphere.  MRA head neck showed nearly complete occlusion of the left M1 segment with somewhat irregular contour suggesting presence of acute thrombosis.. A Dr. Mccartney felt the patient had undiagnosed PAF and recommended empiric anticoagulation.  Note she is now followed by cardiology for PAF.  Dementia due to confusion was a concern as of June 2020.  CT of the head was negative for any acute findings.  Note at this visit patient was restarted on Eliquis for anticoagulation.  Also restarted on Aricept 5 mg every afternoon and start of Namenda    Patient's last visit with cardiology occurred on 8/20/2020 and saw Dr. Miller.  Noted history of PAF and hypertension and intermittent swelling in her ankles.  Patient was to follow-up 1 year and advised to continue Eliquis for antithrombotic therapy.    Patient presented to emergency room on 5/31/2021 with complaint of elevated blood pressure.  CT of the head was performed and negative for any acute findings.    Result Date: 5/31/2021  CT HEAD WITHOUT CONTRAST  HISTORY: Headache and slurred speech.  COMPARISON: 06/28/2020  TECHNIQUE: Axial CT imaging was obtained through brain. IV contrast was administered.  FINDINGS: No acute intracranial hemorrhage is seen. There is diffuse atrophy. There is periventricular and deep white matter microangiopathic change. There is no midline shift or mass effect. Old right basal ganglia infarct is noted. The paranasal sinuses and mastoid air cells appear clear.       Hx renal cancer===removed right kidney  Hx breast cancer  Hx stroke 2018---memory changes---see  "neurologist  Hx past PTH elevation---pt declines work up; no renal stones  Us thyroid==nodules----declines referral for f/u us    Do want f/u on borderline thyroid labs May  B12 was retested and oral working  Lab Results   Component Value Date    PQENEYCP53 580 08/13/2021     Yolis Ospina female 89 y.o., /80   Pulse 70   Temp 97.5 °F (36.4 °C)   Resp 16   Ht 160 cm (62.99\")   Wt 59 kg (130 lb)   LMP  (LMP Unknown)   SpO2 98%   BMI 23.03 kg/m²   who presents today for follow up of Depression and Anxiety.  She reports medication is working well, patient desires to continue on Rx, and needs refill. Onset of symptoms was approximately several years ago. She denies current suicidal and homicidal ideation. Risk factors are lifestyle of multiple roles.  Previous treatment includes current Rx. She complains of the following medication side effects:none. The patient declines to go to counseling..    Lives alone; no driving now  .The following portions of the patient's history were reviewed and updated as appropriate: allergies, current medications, past family history, past medical history, past social history, past surgical history and problem list.    Review of Systems   Constitutional: Negative for activity change, appetite change and unexpected weight change.   HENT: Negative for nosebleeds and trouble swallowing.    Eyes: Negative for pain and visual disturbance.   Respiratory: Negative for chest tightness, shortness of breath and wheezing.    Cardiovascular: Negative for chest pain and palpitations.   Gastrointestinal: Negative for abdominal pain and blood in stool.   Endocrine: Negative.    Genitourinary: Negative for difficulty urinating and hematuria.   Musculoskeletal: Negative for joint swelling.   Skin: Negative for color change and rash.   Allergic/Immunologic: Negative.    Neurological: Negative for syncope and speech difficulty.   Hematological: Negative for adenopathy.   Psychiatric/Behavioral: " Negative for agitation, confusion and dysphoric mood. The patient is nervous/anxious.    All other systems reviewed and are negative.      Objective   Physical Exam  Vitals and nursing note reviewed.   Constitutional:       General: She is not in acute distress.     Appearance: She is well-developed. She is not ill-appearing or toxic-appearing.   HENT:      Head: Normocephalic.      Right Ear: External ear normal.      Left Ear: External ear normal.      Nose: Nose normal.      Mouth/Throat:      Pharynx: Oropharynx is clear.   Eyes:      General: No scleral icterus.     Conjunctiva/sclera: Conjunctivae normal.      Pupils: Pupils are equal, round, and reactive to light.   Neck:      Thyroid: No thyromegaly.      Vascular: No carotid bruit.   Cardiovascular:      Rate and Rhythm: Normal rate and regular rhythm.      Pulses: Normal pulses.      Heart sounds: Normal heart sounds. No murmur heard.      Pulmonary:      Effort: Pulmonary effort is normal. No respiratory distress.      Breath sounds: Normal breath sounds. No rales.   Musculoskeletal:         General: No deformity. Normal range of motion.      Cervical back: Normal range of motion and neck supple.   Skin:     General: Skin is warm and dry.      Findings: No rash.   Neurological:      General: No focal deficit present.      Mental Status: She is alert and oriented to person, place, and time. Mental status is at baseline.   Psychiatric:         Mood and Affect: Mood normal.         Behavior: Behavior normal.         Thought Content: Thought content normal.         Judgment: Judgment normal.         Assessment/Plan   Diagnoses and all orders for this visit:    1. History of stroke (Primary)    2. Anxiety  -     PARoxetine (PAXIL) 10 MG tablet; Take 1 tablet by mouth Every Morning. For stress  Dispense: 90 tablet; Refill: 3  -     busPIRone (BUSPAR) 5 MG tablet; 1 PO BID PRN anxiety  Dispense: 60 tablet; Refill: 11    3. Acquired hypothyroidism    4. Essential  hypertension    5. History of atrial fibrillation  -     Ambulatory Referral to Cardiology    Other orders  -     amLODIPine (Norvasc) 5 MG tablet; Take 1 tablet by mouth Daily. New dose for BP  Dispense: 30 tablet; Refill: 1        She is back on Eliqius and want her to f/u see cardio for PAF  Declines mammo and DEXA  Ok see Dr Birch, cardio  Keep Paxil and Buspar for anxiety and depression--works  Labs May  Watching renal labs.  Plan, Yolis Ospina, was seen today.  she was seen for HTN and add/adjust medication, Hyperlipidemia and will continue current medication and Vitamin D deficiency and will update labs .  Sees neuro for hx CVA and dementia  PTH high--declines work up

## 2021-11-29 NOTE — PROGRESS NOTES
The ABCs of the Annual Wellness Visit  Subsequent Medicare Wellness Visit    Chief Complaint   Patient presents with   • Hyperlipidemia   • Hypertension   • Follow-up      Subjective    History of Present Illness:  Yolis Ospina is a 89 y.o. female who presents for a Subsequent Medicare Wellness Visit.    The following portions of the patient's history were reviewed and   updated as appropriate: allergies, current medications, past family history, past medical history, past social history, past surgical history and problem list.    Compared to one year ago, the patient feels her physical   health is the same.    Compared to one year ago, the patient feels her mental   health is the same.    Recent Hospitalizations:  She was not admitted to the hospital during the last year.       Current Medical Providers:  Patient Care Team:  Morales Bauer MD as PCP - General (Family Medicine)  Jay Rivera MD as Consulting Physician (Endocrinology)  Jesi Crawford APRN as Nurse Practitioner (Nurse Practitioner)  French Miller MD as Consulting Physician (Cardiology)    Outpatient Medications Prior to Visit   Medication Sig Dispense Refill   • amLODIPine (NORVASC) 2.5 MG tablet Take 1 tablet by mouth Daily. For BP (Patient taking differently: Take 2.5 mg by mouth Daily. For BP. May repeat 2nd dose pm if bp indicates) 90 tablet 1   • apixaban (Eliquis) 2.5 MG tablet tablet Take 1 tablet by mouth Every 12 (Twelve) Hours. For a fib 90 tablet 1   • busPIRone (BUSPAR) 5 MG tablet TAKE 1 TABLET BY MOUTH DAILY. 30 tablet 0   • Cyanocobalamin (VITAMIN B-12 PO) Take  by mouth.     • donepezil (ARICEPT) 5 MG tablet TAKE 1 TABLET NIGHTLY FOR 30 DAYS THEN INCREASE TO 2 TABLETS NIGHTLY 60 tablet 2   • fluorometholone (FML) 0.1 % ophthalmic suspension      • lisinopril (PRINIVIL,ZESTRIL) 10 MG tablet Take 1 tablet by mouth Daily. 90 tablet 0   • memantine (Namenda Titration Joey) 28 x 5 MG & 21 x 10 MG tablet pack Follow package  directions. 1 each 0   • [START ON 12/2/2021] memantine (NAMENDA) 10 MG tablet Take 1 tablet by mouth 2 (Two) Times a Day. 180 tablet 1   • PARoxetine (PAXIL) 10 MG tablet TAKE 1 TABLET BY MOUTH EVERY MORNING FOR STRESS. 30 tablet 0   • pravastatin (PRAVACHOL) 40 MG tablet TAKE 1 TABLET BY MOUTH DAILY FOR CHOLESTEROL 90 tablet 1     No facility-administered medications prior to visit.       No opioid medication identified on active medication list. I have reviewed chart for other potential  high risk medication/s and harmful drug interactions in the elderly.          Aspirin is not on active medication list.  Aspirin use is contraindicated for this patient due to: current use of Eliquis.  .    Patient Active Problem List   Diagnosis   • Fatigue   • Hypercalcemia   • Vitamin D deficiency   • Thyroid nodule   • Essential hypertension   • Hypothyroidism   • Hyperlipidemia   • Palpitation   • Anxiety   • Primary hyperparathyroidism (HCC)   • Dysarthria   • Cerebrovascular accident (CVA) due to thrombosis of left middle cerebral artery (HCC)   • History of atrial fibrillation   • Hospital discharge follow-up   • History of stroke   • Nocturnal hypoxemia   • Malignant neoplasm of right kidney (HCC)   • Gait disturbance, post-stroke   • Dyslipidemia   • AARON (generalized anxiety disorder)   • History of breast cancer   • History of kidney cancer   • Essential hypertension   • History of atrial fibrillation   • Hypothyroidism   • Hyperparathyroidism (HCC)   • Thyroid nodule   • Vitamin D deficiency   • Moderate dementia (HCC)     Advance Care Planning  Advance Directive is not on file.  ACP discussion was held with the patient during this visit. Patient has an advance directive (not in EMR), copy requested.          Objective    Vitals:    11/29/21 1104   BP: 162/80   BP Location: Left arm   Patient Position: Sitting   Cuff Size: Adult   Pulse: 70   Resp: 16   Temp: 97.5 °F (36.4 °C)   SpO2: 98%   Weight: 59 kg (130 lb)  "  Height: 160 cm (62.99\")     BMI Readings from Last 1 Encounters:   11/29/21 23.03 kg/m²   BMI is within normal parameters. No follow-up required.    Does the patient have evidence of cognitive impairment? Yes    Physical Exam            HEALTH RISK ASSESSMENT    Smoking Status:  Social History     Tobacco Use   Smoking Status Never Smoker   Smokeless Tobacco Never Used     Alcohol Consumption:  Social History     Substance and Sexual Activity   Alcohol Use No     Fall Risk Screen:    MICHELAADI Fall Risk Assessment was completed, and patient is at LOW risk for falls.Assessment completed on:11/29/2021    Depression Screening:  PHQ-2/PHQ-9 Depression Screening 11/29/2021   Little interest or pleasure in doing things 0   Feeling down, depressed, or hopeless 1   Trouble falling or staying asleep, or sleeping too much 1   Feeling tired or having little energy 0   Poor appetite or overeating 0   Feeling bad about yourself - or that you are a failure or have let yourself or your family down 0   Trouble concentrating on things, such as reading the newspaper or watching television 1   Moving or speaking so slowly that other people could have noticed. Or the opposite - being so fidgety or restless that you have been moving around a lot more than usual 0   Thoughts that you would be better off dead, or of hurting yourself in some way 0   Total Score 3   If you checked off any problems, how difficult have these problems made it for you to do your work, take care of things at home, or get along with other people? Not difficult at all       Health Habits and Functional and Cognitive Screening:  Functional & Cognitive Status 11/29/2021   Do you have difficulty preparing food and eating? No   Do you have difficulty bathing yourself, getting dressed or grooming yourself? No   Do you have difficulty using the toilet? No   Do you have difficulty moving around from place to place? No   Do you have trouble with steps or getting out of a " bed or a chair? No   Current Diet Well Balanced Diet   Dental Exam Up to date   Eye Exam Up to date   Exercise (times per week) 5 times per week   Current Exercises Include Walking   Do you need help using the phone?  No   Are you deaf or do you have serious difficulty hearing?  No   Do you need help with transportation? Yes   Do you need help shopping? No   Do you need help preparing meals?  No   Do you need help with housework?  No   Do you need help with laundry? No   Do you need help taking your medications? No   Do you need help managing money? No   Do you ever drive or ride in a car without wearing a seat belt? No   Have you felt unusual stress, anger or loneliness in the last month? No   Who do you live with? Alone   If you need help, do you have trouble finding someone available to you? No   Have you been bothered in the last four weeks by sexual problems? No   Do you have difficulty concentrating, remembering or making decisions? Yes       Age-appropriate Screening Schedule:  Refer to the list below for future screening recommendations based on patient's age, sex and/or medical conditions. Orders for these recommended tests are listed in the plan section. The patient has been provided with a written plan.    Health Maintenance   Topic Date Due   • DXA SCAN  09/24/2015   • ZOSTER VACCINE (2 of 2) 05/03/2017   • MAMMOGRAM  12/05/2019   • LIPID PANEL  05/26/2022   • TDAP/TD VACCINES (2 - Td or Tdap) 03/08/2027   • INFLUENZA VACCINE  Completed              Assessment/Plan   CMS Preventative Services Quick Reference  Risk Factors Identified During Encounter  Dementia/Memory   The above risks/problems have been discussed with the patient.  Follow up actions/plans if indicated are seen below in the Assessment/Plan Section.  Pertinent information has been shared with the patient in the After Visit Summary.    There are no diagnoses linked to this encounter.    Follow Up:   No follow-ups on file.     An After Visit  Summary and PPPS were made available to the patient.

## 2021-12-09 ENCOUNTER — TELEPHONE (OUTPATIENT)
Dept: FAMILY MEDICINE CLINIC | Facility: CLINIC | Age: 86
End: 2021-12-09

## 2021-12-09 NOTE — TELEPHONE ENCOUNTER
Caller: TANYA VILLALBA    Relationship: Other    Best call back number: [phone]    What is the best time to reach you: ANY TIME    Who are you requesting to speak with (clinical staff, provider,  specific staff member): CLINICAL STAFF    What was the call regarding: TANYA (NOT LISTED ON PCP VERBAL, ONLY NEURO) CALLED ON BEHALF OF PATIENT CHECKING ON A REFERRAL TO CARDIOLOGY. SHE WAS SUPPOSED TO BE REFERRED DR. DEWEY AT [facility] BUT HASN'T HEARD ANYTHING BACK AS FAR AS SCHEDULING.    PLEASE ADVISE    Do you require a callback: YES

## 2021-12-09 NOTE — TELEPHONE ENCOUNTER
Spoke with pt's daughter, and gave her info.  Adv her to update release when she comes in next visit.

## 2022-01-26 RX ORDER — AMLODIPINE BESYLATE 5 MG/1
TABLET ORAL
Qty: 60 TABLET | Refills: 0 | Status: SHIPPED | OUTPATIENT
Start: 2022-01-26 | End: 2022-03-21

## 2022-02-02 RX ORDER — DONEPEZIL HYDROCHLORIDE 5 MG/1
TABLET, FILM COATED ORAL
Qty: 180 TABLET | Refills: 2 | Status: SHIPPED | OUTPATIENT
Start: 2022-02-02 | End: 2022-05-05 | Stop reason: SDUPTHER

## 2022-03-21 RX ORDER — AMLODIPINE BESYLATE 5 MG/1
5 TABLET ORAL DAILY
Qty: 30 TABLET | Refills: 0 | Status: SHIPPED | OUTPATIENT
Start: 2022-03-21 | End: 2022-04-22

## 2022-04-22 DIAGNOSIS — Z86.73 HISTORY OF STROKE: ICD-10-CM

## 2022-04-22 RX ORDER — AMLODIPINE BESYLATE 5 MG/1
5 TABLET ORAL DAILY
Qty: 30 TABLET | Refills: 0 | Status: SHIPPED | OUTPATIENT
Start: 2022-04-22 | End: 2022-05-23 | Stop reason: SDUPTHER

## 2022-04-22 RX ORDER — APIXABAN 2.5 MG/1
TABLET, FILM COATED ORAL
Qty: 60 TABLET | Refills: 1 | Status: SHIPPED | OUTPATIENT
Start: 2022-04-22 | End: 2022-05-05 | Stop reason: SDUPTHER

## 2022-05-05 ENCOUNTER — OFFICE VISIT (OUTPATIENT)
Dept: NEUROLOGY | Facility: CLINIC | Age: 87
End: 2022-05-05

## 2022-05-05 VITALS
HEIGHT: 63 IN | HEART RATE: 64 BPM | OXYGEN SATURATION: 97 % | SYSTOLIC BLOOD PRESSURE: 110 MMHG | DIASTOLIC BLOOD PRESSURE: 68 MMHG | WEIGHT: 127.4 LBS | BODY MASS INDEX: 22.57 KG/M2

## 2022-05-05 DIAGNOSIS — F03.B0 MODERATE DEMENTIA WITHOUT BEHAVIORAL DISTURBANCE: ICD-10-CM

## 2022-05-05 DIAGNOSIS — Z86.73 HISTORY OF STROKE: Primary | ICD-10-CM

## 2022-05-05 DIAGNOSIS — I48.0 PAROXYSMAL ATRIAL FIBRILLATION: ICD-10-CM

## 2022-05-05 PROCEDURE — 99214 OFFICE O/P EST MOD 30 MIN: CPT | Performed by: NURSE PRACTITIONER

## 2022-05-05 RX ORDER — CHLORAL HYDRATE 500 MG
CAPSULE ORAL
COMMUNITY

## 2022-05-05 RX ORDER — DONEPEZIL HYDROCHLORIDE 5 MG/1
TABLET, FILM COATED ORAL
Qty: 90 TABLET | Refills: 3 | Status: SHIPPED | OUTPATIENT
Start: 2022-05-05

## 2022-05-05 NOTE — PROGRESS NOTES
CC: Follow-up for dementia and history of stroke    HPI:  Yolis Ospina is a  89 y.o.  right-handed female with HTN, HLD, PAF on Eliquis 2.5 mg twice daily, history of breast cancer status postmastectomy/radiation 2008, kidney cancer status post nephrectomy in 1991, thyroid dysfunction, and vitamin D deficiency who is being seen in follow-up today for stroke and dementia.  She is accompanied by her friend Catherine.  She was last seen by me in November 2021.    The following history was reviewed and updated as appropriate:  She presented to [facility] in summer 2018 with word finding difficulty.  She is not on antiplatelet or anticoagulation.  MRI brain showed multiple small acute cortical infarct scattered in the left cerebral hemisphere.  MRA head/neck showed nearly complete occlusion of the left M1 segment with somewhat irregular contour suggesting presence of acute thrombus.  Dr. Mccartney felt the patient had undiagnosed PAF and recommended empiric anticoagulation.  She is now followed by cardiology for PAF.     In June 2020 she had an ED visit for some confusion was felt to have dementia.  CT head was negative at that time.  In June 2021 [place] cognitive assessment was completed and she scored 15 out of 30.  I recommended she no longer drives.  Her medications are now managed by her friend Catherine.  Her daughter Teresa is also involved however she lives in [place].       She is unable to tolerate Aricept at 10 mg nightly due to diarrhea so she is currently taking 5 mg nightly.  I started Namenda last visit and she has tolerated it well, currently taking 10 mg twice daily.    Catherine reports the patient's repetition and speech is a little worse but otherwise no significant changes since our last visit.  No falls since our last visit.  No TIA or stroke symptoms since her last visit.    She is now followed by Dr. Birch with [facility].  She most recently saw him in March 2022.    Past Medical History:  "  Diagnosis Date   • Cerebrovascular accident (CVA) due to thrombosis of left middle cerebral artery (HCC)    • H/O complete eye exam DUE   • History of atrial fibrillation    • History of breast cancer 08/13/2008    Right Breast, Ajacent to a 4.3x3.0x2.9cm biopsy cavity focus of in-situ and invasive ductal carcinoma spanning a length of approximately 0.4cm. Skin, nipple, margins negative. Lymphovascular invasion none seen. axillary tail lymph nodes(2) negatives, ER/DC +, HER2 -   • History of colon polyps 11/01/2010    Cecal Polyp: Fragments of Adenomatous polyp w/ mild atypia   • History of diverticulosis 11/01/2010    Sigmoid Colon   • History of stroke    • Hx of bone density study 09/24/2013   • Hypercalcemia    • Hyperlipidemia    • Hypertension    • Hypothyroid    • Malignant neoplasm of right kidney (HCC)    • Palpitation    • Primary hyperparathyroidism (HCC)    • Spondylolisthesis of lumbar region     at L4-5 level and spinal stenosis   • Transient confusion    • Vitamin D deficiency          Past Surgical History:   Procedure Laterality Date   • BREAST EXCISIONAL BIOPSY Right 07/09/2008    Needle localized breast excisional biopsy(Infiltrating duct carcinoma moderately differentiated\"Bryant score 6/9\". Invasive tumor measures 1.2cm in greatest diameter. Intermediate to high nuclear grade ductal carcinoma in-situ without central necrosis, solid and cribriform patterns. In-situ and invasive carcinoma extend to the margins of excision. ER/DC (+), HER2 (-)-Dr. Cornel Tripp   • BREAST SURGERY  1997    For breast cancer    • COLONOSCOPY N/A 11/01/2010    One small cecal polyp, sigmoid diverticulosis, tortuous colon-Dr. Cornel Tripp   • COLONOSCOPY N/A 09/24/2004    Left colon diverticulosis-Dr. Cornel Tripp   • COLONOSCOPY N/A 11/12/2012    Diverticulosis in the sigmoid colon and descending colon, Ileum caitie, Distal rectum & anal verge normal, repeat in 5 years-Dr. Cornel Tripp   • LUMBAR " EPIDURAL INJECTION N/A 01/09/2006, 1/24/2006    Steroids-Dr. Gen Ospina   • LUMBAR EPIDURAL INJECTION N/A 12/08/2005    Steroids-Dr. Yuni Weinstein   • MAMMO BILATERAL  10/2015    [facility]    • MASTECTOMY Right 08/13/2008    Simple mastectomy-Dr. Cornel Tripp   • NEPHRECTOMY  1991    Secondary to renal cancer    • PAP SMEAR  DUE   • THYROID BIOPSY Left 07/29/2014    Thyroid FNA( Follicular epithelial cells present, consistent with B[place]roft category II. No evidence of papillary carcinoma-Dr. Billy Hernandez           Current Outpatient Medications:   •  amLODIPine (NORVASC) 5 MG tablet, TAKE 1 TABLET BY MOUTH DAILY., Disp: 30 tablet, Rfl: 0  •  apixaban (Eliquis) 2.5 MG tablet tablet, Take 1 tablet by mouth Every 12 (Twelve) Hours., Disp: 60 tablet, Rfl: 6  •  busPIRone (BUSPAR) 5 MG tablet, 1 PO BID PRN anxiety, Disp: 60 tablet, Rfl: 11  •  Cyanocobalamin (VITAMIN B-12 PO), Take 1,000 mcg by mouth Daily., Disp: , Rfl:   •  donepezil (ARICEPT) 5 MG tablet, TAKE 1 TABLET BY MOUTH NIGHTLY, Disp: 90 tablet, Rfl: 3  •  fluorometholone (FML) 0.1 % ophthalmic suspension, , Disp: , Rfl:   •  memantine (NAMENDA) 10 MG tablet, Take 1 tablet by mouth 2 (Two) Times a Day., Disp: 180 tablet, Rfl: 1  •  Omega-3 Fatty Acids (fish oil) 1000 MG capsule capsule, Take  by mouth Daily With Breakfast., Disp: , Rfl:   •  PARoxetine (PAXIL) 10 MG tablet, Take 1 tablet by mouth Every Morning. For stress, Disp: 90 tablet, Rfl: 3  •  pravastatin (PRAVACHOL) 40 MG tablet, TAKE 1 TABLET BY MOUTH DAILY FOR CHOLESTEROL, Disp: 90 tablet, Rfl: 1  •  vitamin D3 125 MCG (5000 UT) capsule capsule, Take 5,000 Units by mouth Daily., Disp: , Rfl:       Family History   Problem Relation Age of Onset   • Cancer Father    • Cancer Sister    • Heart disease Brother    • Cancer Brother    • Cancer Brother    • Cancer Brother    • Cancer Brother    • Cancer Brother    • Cancer Brother    • Cancer Sister    • Cancer Sister    • Cancer Sister    •  "Cancer Sister    • Cancer Sister          Social History     Socioeconomic History   • Marital status:    Tobacco Use   • Smoking status: Never Smoker   • Smokeless tobacco: Never Used   Substance and Sexual Activity   • Alcohol use: No   • Drug use: No   • Sexual activity: Defer         Allergies   Allergen Reactions   • Iodinated Diagnostic Agents Hives         Pain Scale:        ROS:  Review of Systems   Constitutional: Negative for activity change, appetite change, fatigue and unexpected weight change.   HENT: Negative for dental problem, drooling, ear pain, facial swelling, hearing loss, nosebleeds, tinnitus and trouble swallowing.    Eyes: Negative for photophobia, pain and visual disturbance.   Respiratory: Negative for choking, chest tightness, shortness of breath, wheezing and stridor.    Cardiovascular: Negative for chest pain, palpitations and leg swelling.   Musculoskeletal: Negative for arthralgias, gait problem, joint swelling, neck pain and neck stiffness.   Neurological: Negative for dizziness, tremors, facial asymmetry, speech difficulty, weakness, light-headedness, numbness and headaches.   Psychiatric/Behavioral: Negative for confusion, decreased concentration and sleep disturbance. The patient is not nervous/anxious.    ROS completed by the MA was reviewed by me and I agree.    Physical Exam:  Vitals:    05/05/22 1251   BP: 110/68   Pulse: 64   SpO2: 97%   Weight: 57.8 kg (127 lb 6.4 oz)   Height: 160 cm (63\")     Orthostatic BP:    Body mass index is 22.57 kg/m².    General appearance: Well developed, well nourished, well groomed, alert and cooperative.   HEENT: Normocephalic.   Neck: Supple  Cardiac: Regular rate and rhythm.   Peripheral Vasculature: Radial pulses are equal and symmetric.  Chest Exam: Clear to auscultation bilaterally, no wheezes, no rhonchi.  Extremities: Normal, no edema.   Skin: No rashes or birthmarks.     Higher integrative function: Oriented to time, place, person. " Initially stated it was April but corrected herself. Impaired recent memory. Spontaneous speech, fund of vocabulary are normal. No neglect.   CN II: Visual fields intact..   CN III IV VI: Extraocular movements are full without nystagmus. Pupils are equal, round, and reactive to light.   CN V: Normal facial sensation.  CN VII: Facial movements are symmetric, no weakness.   CN VIII: Auditory acuity is intact to finger rub bilaterally.  CN IX & X: Symmetric palatal movement.   CN XI: Sternocleidomastoid and trapezius are normal. No weakness.   CN XII: The tongue is midline. No atrophy or fasciculations.   Motor: Normal muscle strength, bulk, and tone in upper and lower extremities. No fasciculations, rigidity, spasticity or abnormal movements.   Sensation: Normal light touch, pinprick in hands and ankles.  Station and gait: Mildly unsteady.  Coordination: Finger to nose test showed no dysmetria. Heel to shin normal.         Results:      Lab Results   Component Value Date    GLUCOSE 129 (H) 08/13/2021    BUN 18 08/13/2021    CREATININE 0.95 08/13/2021    EGFRIFNONA 55 (L) 08/13/2021    EGFRIFAFRI 67 08/13/2021    BCR 18.9 08/13/2021    CO2 27.2 08/13/2021    CALCIUM 11.7 (H) 08/13/2021    PROTENTOTREF 7.4 05/26/2021    ALBUMIN 4.00 05/31/2021    LABIL2 1.5 05/26/2021    AST 24 05/31/2021    ALT 21 05/31/2021       Lab Results   Component Value Date    WBC 5.37 05/31/2021    HGB 13.6 05/31/2021    HCT 40.6 05/31/2021    MCV 91.0 05/31/2021     05/31/2021         .No results found for: RPR      Lab Results   Component Value Date    TSH 4.570 (H) 05/26/2021    L1JTHYO 6.6 05/16/2018         Lab Results   Component Value Date    MOOAGPID27 580 08/13/2021         Lab Results   Component Value Date    FOLATE 9.0 05/26/2021         Lab Results   Component Value Date    HGBA1C 5.5 05/26/2021         Lab Results   Component Value Date    GLUCOSE 129 (H) 08/13/2021    BUN 18 08/13/2021    CREATININE 0.95 08/13/2021     EGFRIFNONA 55 (L) 08/13/2021    EGFRIFAFRI 67 08/13/2021    BCR 18.9 08/13/2021    K 4.4 08/13/2021    CO2 27.2 08/13/2021    CALCIUM 11.7 (H) 08/13/2021    PROTENTOTREF 7.4 05/26/2021    ALBUMIN 4.00 05/31/2021    LABIL2 1.5 05/26/2021    AST 24 05/31/2021    ALT 21 05/31/2021         Lab Results   Component Value Date    WBC 5.37 05/31/2021    HGB 13.6 05/31/2021    HCT 40.6 05/31/2021    MCV 91.0 05/31/2021     05/31/2021             Assessment:   Moderate dementia  History of left MCA stroke  PAF, anticoagulated        Plan:   Continue Eliquis 2.5 mg twice daily, refilled today   Continue Aricept 5 mg nightly and Namenda 10 mg twice daily   Blood pressure control to <130/80   Goal LDL <70-recommend high dose statins-    Serum glucose < 140   Call 911 for stroke any stroke symptoms   Follow-up in 1 year or sooner if needed.  Diagnoses and all orders for this visit:    1. History of stroke (Primary)  -     apixaban (Eliquis) 2.5 MG tablet tablet; Take 1 tablet by mouth Every 12 (Twelve) Hours.  Dispense: 60 tablet; Refill: 6    2. Moderate dementia without behavioral disturbance (HCC)    3. Paroxysmal atrial fibrillation (HCC)    Other orders  -     donepezil (ARICEPT) 5 MG tablet; TAKE 1 TABLET BY MOUTH NIGHTLY  Dispense: 90 tablet; Refill: 3        Greater than 30 minutes spent in review of the chart, discussion with and examination of patient, discussion with patient's caregiver, coordination of care, and documentation.                Dictated utilizing Dragon dictation.

## 2022-05-23 ENCOUNTER — OFFICE VISIT (OUTPATIENT)
Dept: FAMILY MEDICINE CLINIC | Facility: CLINIC | Age: 87
End: 2022-05-23

## 2022-05-23 VITALS
WEIGHT: 129 LBS | SYSTOLIC BLOOD PRESSURE: 138 MMHG | DIASTOLIC BLOOD PRESSURE: 80 MMHG | RESPIRATION RATE: 16 BRPM | BODY MASS INDEX: 22.86 KG/M2 | OXYGEN SATURATION: 98 % | HEIGHT: 63 IN | TEMPERATURE: 97.5 F | HEART RATE: 67 BPM

## 2022-05-23 DIAGNOSIS — Z86.73 HISTORY OF STROKE: ICD-10-CM

## 2022-05-23 DIAGNOSIS — R79.89 HIGH SERUM PARATHYROID HORMONE (PTH): ICD-10-CM

## 2022-05-23 DIAGNOSIS — E78.2 MIXED HYPERLIPIDEMIA: ICD-10-CM

## 2022-05-23 DIAGNOSIS — E55.9 VITAMIN D DEFICIENCY: ICD-10-CM

## 2022-05-23 DIAGNOSIS — I10 ESSENTIAL HYPERTENSION: Primary | ICD-10-CM

## 2022-05-23 DIAGNOSIS — C64.1 MALIGNANT NEOPLASM OF RIGHT KIDNEY: ICD-10-CM

## 2022-05-23 DIAGNOSIS — E21.3 HYPERPARATHYROIDISM: ICD-10-CM

## 2022-05-23 DIAGNOSIS — E03.9 ACQUIRED HYPOTHYROIDISM: ICD-10-CM

## 2022-05-23 DIAGNOSIS — Z86.79 HISTORY OF ATRIAL FIBRILLATION: ICD-10-CM

## 2022-05-23 DIAGNOSIS — F41.9 ANXIETY: ICD-10-CM

## 2022-05-23 PROCEDURE — G0009 ADMIN PNEUMOCOCCAL VACCINE: HCPCS | Performed by: PHYSICIAN ASSISTANT

## 2022-05-23 PROCEDURE — 90677 PCV20 VACCINE IM: CPT | Performed by: PHYSICIAN ASSISTANT

## 2022-05-23 PROCEDURE — 99214 OFFICE O/P EST MOD 30 MIN: CPT | Performed by: PHYSICIAN ASSISTANT

## 2022-05-23 RX ORDER — PRAVASTATIN SODIUM 40 MG
40 TABLET ORAL DAILY
Qty: 90 TABLET | Refills: 1 | Status: SHIPPED | OUTPATIENT
Start: 2022-05-23 | End: 2022-11-17

## 2022-05-23 RX ORDER — AMLODIPINE BESYLATE 5 MG/1
5 TABLET ORAL DAILY
Qty: 90 TABLET | Refills: 1 | Status: SHIPPED | OUTPATIENT
Start: 2022-05-23 | End: 2022-11-17

## 2022-05-23 NOTE — PROGRESS NOTES
"Subjective   Yolis Ospina is a 89 y.o. female.     History of Present Illness    Since the last visit, she has overall felt well.  She has Primary Hypertension and well controlled on current medication, Hyperlipidemia with goals met with current Rx, Hyperlipidemia and will start medication plan for treatment.  I will order follow up labs, Atrial Fibillation and remains under the care of their cardiologist for management and Vitamin D deficiency and will update labs for continued management.  she has been compliant with current medications have reviewed them.  The patient denies medication side effects.  Will refill medications. /65 (BP Location: Left arm, Patient Position: Sitting, Cuff Size: Adult)   Pulse 67   Temp 97.5 °F (36.4 °C)   Resp 16   Ht 160 cm (62.99\")   Wt 58.5 kg (129 lb)   LMP  (LMP Unknown)   SpO2 98%   BMI 22.86 kg/m²   This dose November visit of amlodipine from 2.5 to 5 mg blood pressures been much improved and controlled.  126/70 home    Results for orders placed or performed in visit on 08/13/21   Magnesium    Specimen: Blood   Result Value Ref Range    Magnesium 2.3 1.7 - 2.3 mg/dL   Basic metabolic panel    Specimen: Blood   Result Value Ref Range    Glucose 129 (H) 65 - 99 mg/dL    BUN 18 8 - 23 mg/dL    Creatinine 0.95 0.57 - 1.00 mg/dL    eGFR Non African Am 55 (L) >60 mL/min/1.73    eGFR African Am 67 >60 mL/min/1.73    BUN/Creatinine Ratio 18.9 7.0 - 25.0    Sodium 143 136 - 145 mmol/L    Potassium 4.4 3.5 - 5.2 mmol/L    Chloride 108 (H) 98 - 107 mmol/L    Total CO2 27.2 22.0 - 29.0 mmol/L    Calcium 11.7 (H) 8.2 - 9.6 mg/dL   Vitamin B12    Specimen: Blood   Result Value Ref Range    Vitamin B-12 580 211 - 946 pg/mL   Keep Paxil and Buspar for anxiety and depression--works  Lives alone  Here with friend----Palak  Est CC 35    B12 was retested and oral working  Hx renal cancer===removed right kidney  Hx breast cancer  Hx stroke 2018---memory changes---see neurologist  Hx " past PTH elevation---pt declines work up; no renal stones  Us thyroid==nodules----declines referral for f/u us    Also note patient has history of hyperparathyroidism with PTH level intact 5/26/2021 of 116 and continued elevation of calcium levels on labs.  Note that patient refuses referral to ENT Dr. Angel for further work-up and treatment.   Patient presented to emergency room on 5/31/2021 with complaint of elevated blood pressure.  CT of the head was performed and negative for any acute findings    Saw cardio DR Birch 3-4-22    HPI: Yolis Ospina is a 89 yr/o female who presents today for cardiac evaluation. She was accompanied by her daughter. In the past she had been followed by Dr. Miller with [facility], but wished to switch her care to [facility]. The patient has a history of paroxysmal atrial fibrillation as well as essential hypertension. She is status post cerebrovascular accident that was presumably related to her atrial fibrillation. Her [id number] score is 6 and she is on chronic anticoagulation with apixaban. She is not on a beta-blocker or specific antiarrhythmic therapy, however.      The patient is very functional and lives independently. She still does all of her own housework including vacuuming the floors.The patient has no complaints of chest discomfort with either exertion or at rest. She likewise denies problems with dyspnea including exertional dyspnea, orthopnea, or paroxysmal nocturnal dyspnea. She has had no problems with palpitations or symptoms of presyncope/syncope. She has also been without complaints of lower extremity claudication. The patient does have mild chronic ankle edema which seems to be more pronounced when she is up on her feet and resolves after elevating her legs or sleeping. She has not had any problems with falls or problems with bleeding while on her anticoagulation with apixaban.  EKG reviewed by me and shows sinus bradycardia with a rate  of 57 bpm. Tracing is within normal limits. There is no prior EKG available for comparison.    ASSESSMENT AND PLAN:  ICD-10-CM ICD-9-CM   1. Paroxysmal atrial fibrillation (CMS/HCC) I48.0 427.31   2. History of ? cardioembolic cerebrovascular accident (CVA) Z86.73 V12.54   3. Essential hypertension I10 401.9     The patient is evaluated for history of paroxysmal atrial fibrillation with a history of presumed cardioembolic stroke related to this. She is completely asymptomatic from a cardiac standpoint and very functional for her age. It is of note the patient is not on a beta-blocker or antiarrhythmic therapy with sinus bradycardia noted at present. Agree with continuing the patient on Eliquis given her increased CHADS2-VASc score which is 6 putting her at significant risk for recurrent stroke or other thrombolic events from her paroxysmal atrial fibrillation. Her hypertension appears to be well controlled at present and no changes will be made in her current regimen.      BP at cardio 120/78  Saw neuro----5-5-22  She [facility] summer 2018 with word finding difficulty.  She is not on antiplatelet or anticoagulation.  MRI brain showed multiple small acute cortical infarct scattered in the left cerebral hemisphere.  MRA head/neck showed nearly complete occlusion of the left M1 segment with somewhat irregular contour suggesting presence of acute thrombus.   On Dementia med since June 2021  She is unable to tolerate Aricept at 10 mg nightly due to diarrhea so she is currently taking 5 mg nightly.  I started Namenda last visit and she has tolerated it well, currently taking 10 mg twice daily    Assessment:   Moderate dementia  History of left MCA stroke  PAF, anticoagulated  Plan:   Continue Eliquis 2.5 mg twice daily, refilled today              Continue Aricept 5 mg nightly and Namenda 10 mg twice daily              Blood pressure control to <130/80              Goal LDL <70-recommend  high dose statins-                         Serum glucose < 140              Call 911 for stroke any stroke symptoms              Follow-up in 1 year or sooner if needed.  Diagnoses and all orders for this visit:  The following portions of the patient's history were reviewed and updated as appropriate: allergies, current medications, past family history, past medical history, past social history, past surgical history and problem list.    Review of Systems   Constitutional: Negative for activity change, appetite change and unexpected weight change.   HENT: Negative for nosebleeds and trouble swallowing.    Eyes: Negative for pain and visual disturbance.   Respiratory: Negative for chest tightness, shortness of breath and wheezing.    Cardiovascular: Negative for chest pain and palpitations.   Gastrointestinal: Negative for abdominal pain and blood in stool.   Endocrine: Negative.    Genitourinary: Negative for difficulty urinating and hematuria.   Musculoskeletal: Negative for joint swelling.   Skin: Negative for color change and rash.   Allergic/Immunologic: Negative.    Neurological: Negative for syncope and speech difficulty.   Hematological: Negative for adenopathy.   Psychiatric/Behavioral: Positive for sleep disturbance. Negative for agitation and confusion. The patient is not nervous/anxious.    All other systems reviewed and are negative.      Objective   Physical Exam  Vitals and nursing note reviewed.   Constitutional:       General: She is not in acute distress.     Appearance: She is well-developed. She is not ill-appearing or toxic-appearing.   HENT:      Head: Normocephalic.      Right Ear: External ear normal.      Left Ear: External ear normal.      Nose: Nose normal.      Mouth/Throat:      Pharynx: Oropharynx is clear.   Eyes:      General: No scleral icterus.     Conjunctiva/sclera: Conjunctivae normal.      Pupils: Pupils are equal, round, and reactive to light.   Neck:      Thyroid: No  thyromegaly.      Vascular: No carotid bruit.   Cardiovascular:      Rate and Rhythm: Normal rate and regular rhythm.      Pulses: Normal pulses.      Heart sounds: Normal heart sounds. No murmur heard.  Pulmonary:      Effort: Pulmonary effort is normal. No respiratory distress.      Breath sounds: Normal breath sounds. No rales.   Musculoskeletal:         General: No deformity. Normal range of motion.      Cervical back: Normal range of motion and neck supple.      Right lower leg: No edema.      Left lower leg: No edema.   Skin:     General: Skin is warm and dry.      Findings: No rash.   Neurological:      General: No focal deficit present.      Mental Status: She is alert and oriented to person, place, and time. Mental status is at baseline.   Psychiatric:         Mood and Affect: Mood normal.         Behavior: Behavior normal.         Thought Content: Thought content normal.         Judgment: Judgment normal.         Assessment & Plan   Diagnoses and all orders for this visit:    1. Essential hypertension (Primary)    2. Mixed hyperlipidemia    3. History of atrial fibrillation    4. High serum parathyroid hormone (PTH)    5. Acquired hypothyroidism    6. Anxiety    7. History of stroke    8. Vitamin D deficiency    9. Malignant neoplasm of right kidney (HCC)    10. Hyperparathyroidism (HCC)    Other orders  -     pravastatin (PRAVACHOL) 40 MG tablet; Take 1 tablet by mouth Daily. FOR CHOLESTEROL  Dispense: 90 tablet; Refill: 1  -     amLODIPine (NORVASC) 5 MG tablet; Take 1 tablet by mouth Daily. For BP  Dispense: 90 tablet; Refill: 1  -     Pneumococcal Conjugate Vaccine 20-Valent All         Keep Paxil and Buspar for anxiety and depression--works  PTH high--declines work up   Update labs  Plan, Yolis Ospina, was seen today.  she was seen for HTN and continue medication, Hyperlipidemia and will continue current medication, Atrial Fibrillation and remains on medication for treatment and is stable, Atrial  Fibrillation and remains under the care of their cardiologist for medical management and is stable and Vitamin D deficiency and will update labs .  Meds for dementia through neurology  Treat AGNIESZKA morrissey remains on anticoagulant and sees cardiology yearly  Declines DEXA and mammo  Hx renal cancer----remission

## 2022-05-23 NOTE — PROGRESS NOTES
Immunization  Immunization performed in LD by Malia Bolivar MA. Patient tolerated the procedure well without complications.     05/23/22   Malia Bolivar MA

## 2022-05-24 LAB
25(OH)D3+25(OH)D2 SERPL-MCNC: 36.7 NG/ML (ref 30–100)
ALBUMIN SERPL-MCNC: 4.4 G/DL (ref 3.5–4.6)
ALBUMIN/GLOB SERPL: 1.4 {RATIO} (ref 1.2–2.2)
ALP SERPL-CCNC: 118 IU/L (ref 44–121)
ALT SERPL-CCNC: 12 IU/L (ref 0–32)
AST SERPL-CCNC: 17 IU/L (ref 0–40)
BASOPHILS # BLD AUTO: 0 X10E3/UL (ref 0–0.2)
BASOPHILS NFR BLD AUTO: 0 %
BILIRUB SERPL-MCNC: 0.6 MG/DL (ref 0–1.2)
BUN SERPL-MCNC: 14 MG/DL (ref 10–36)
BUN/CREAT SERPL: 14 (ref 12–28)
CA-I SERPL ISE-MCNC: 6.1 MG/DL (ref 4.5–5.6)
CALCIUM SERPL-MCNC: 10.8 MG/DL (ref 8.7–10.3)
CHLORIDE SERPL-SCNC: 108 MMOL/L (ref 96–106)
CHOLEST SERPL-MCNC: 182 MG/DL (ref 100–199)
CO2 SERPL-SCNC: 22 MMOL/L (ref 20–29)
CREAT SERPL-MCNC: 0.97 MG/DL (ref 0.57–1)
EGFRCR SERPLBLD CKD-EPI 2021: 55 ML/MIN/1.73
EOSINOPHIL # BLD AUTO: 0.1 X10E3/UL (ref 0–0.4)
EOSINOPHIL NFR BLD AUTO: 1 %
ERYTHROCYTE [DISTWIDTH] IN BLOOD BY AUTOMATED COUNT: 12.8 % (ref 11.7–15.4)
FOLATE SERPL-MCNC: 12.1 NG/ML
GLOBULIN SER CALC-MCNC: 3.1 G/DL (ref 1.5–4.5)
GLUCOSE SERPL-MCNC: 90 MG/DL (ref 65–99)
HCT VFR BLD AUTO: 41.9 % (ref 34–46.6)
HDLC SERPL-MCNC: 79 MG/DL
HGB BLD-MCNC: 13.6 G/DL (ref 11.1–15.9)
IMM GRANULOCYTES # BLD AUTO: 0 X10E3/UL (ref 0–0.1)
IMM GRANULOCYTES NFR BLD AUTO: 0 %
LDLC SERPL CALC-MCNC: 81 MG/DL (ref 0–99)
LYMPHOCYTES # BLD AUTO: 1.3 X10E3/UL (ref 0.7–3.1)
LYMPHOCYTES NFR BLD AUTO: 22 %
MAGNESIUM SERPL-MCNC: 2.2 MG/DL (ref 1.6–2.3)
MCH RBC QN AUTO: 29.8 PG (ref 26.6–33)
MCHC RBC AUTO-ENTMCNC: 32.5 G/DL (ref 31.5–35.7)
MCV RBC AUTO: 92 FL (ref 79–97)
MONOCYTES # BLD AUTO: 0.5 X10E3/UL (ref 0.1–0.9)
MONOCYTES NFR BLD AUTO: 8 %
NEUTROPHILS # BLD AUTO: 4.2 X10E3/UL (ref 1.4–7)
NEUTROPHILS NFR BLD AUTO: 69 %
PLATELET # BLD AUTO: 246 X10E3/UL (ref 150–450)
POTASSIUM SERPL-SCNC: 4.3 MMOL/L (ref 3.5–5.2)
PROT SERPL-MCNC: 7.5 G/DL (ref 6–8.5)
PTH-INTACT SERPL-MCNC: 114 PG/ML (ref 15–65)
RBC # BLD AUTO: 4.57 X10E6/UL (ref 3.77–5.28)
SODIUM SERPL-SCNC: 142 MMOL/L (ref 134–144)
T3FREE SERPL-MCNC: 2.9 PG/ML (ref 2–4.4)
T4 FREE SERPL-MCNC: 0.88 NG/DL (ref 0.82–1.77)
TRIGL SERPL-MCNC: 128 MG/DL (ref 0–149)
TSH SERPL DL<=0.005 MIU/L-ACNC: 4.45 UIU/ML (ref 0.45–4.5)
VIT B12 SERPL-MCNC: 692 PG/ML (ref 232–1245)
VLDLC SERPL CALC-MCNC: 22 MG/DL (ref 5–40)
WBC # BLD AUTO: 6.1 X10E3/UL (ref 3.4–10.8)

## 2022-05-24 RX ORDER — MEMANTINE HYDROCHLORIDE 10 MG/1
10 TABLET ORAL 2 TIMES DAILY
Qty: 180 TABLET | Refills: 3 | Status: SHIPPED | OUTPATIENT
Start: 2022-05-24 | End: 2023-05-24

## 2022-11-07 ENCOUNTER — OFFICE VISIT (OUTPATIENT)
Dept: FAMILY MEDICINE CLINIC | Facility: CLINIC | Age: 87
End: 2022-11-07

## 2022-11-07 ENCOUNTER — HOSPITAL ENCOUNTER (OUTPATIENT)
Dept: CARDIOLOGY | Facility: HOSPITAL | Age: 87
Discharge: HOME | End: 2022-11-07
Admitting: NURSE PRACTITIONER

## 2022-11-07 VITALS
DIASTOLIC BLOOD PRESSURE: 78 MMHG | BODY MASS INDEX: 22.5 KG/M2 | OXYGEN SATURATION: 99 % | HEART RATE: 64 BPM | HEIGHT: 63 IN | WEIGHT: 127 LBS | SYSTOLIC BLOOD PRESSURE: 153 MMHG

## 2022-11-07 DIAGNOSIS — M79.661 RIGHT CALF PAIN: Primary | ICD-10-CM

## 2022-11-07 DIAGNOSIS — M79.661 RIGHT CALF PAIN: ICD-10-CM

## 2022-11-07 DIAGNOSIS — Z86.79 HISTORY OF ATRIAL FIBRILLATION: ICD-10-CM

## 2022-11-07 LAB
BH CV LOWER VASCULAR LEFT COMMON FEMORAL AUGMENT: NORMAL
BH CV LOWER VASCULAR LEFT COMMON FEMORAL COMPETENT: NORMAL
BH CV LOWER VASCULAR LEFT COMMON FEMORAL COMPRESS: NORMAL
BH CV LOWER VASCULAR LEFT COMMON FEMORAL PHASIC: NORMAL
BH CV LOWER VASCULAR LEFT COMMON FEMORAL SPONT: NORMAL
BH CV LOWER VASCULAR RIGHT COMMON FEMORAL AUGMENT: NORMAL
BH CV LOWER VASCULAR RIGHT COMMON FEMORAL COMPETENT: NORMAL
BH CV LOWER VASCULAR RIGHT COMMON FEMORAL COMPRESS: NORMAL
BH CV LOWER VASCULAR RIGHT COMMON FEMORAL PHASIC: NORMAL
BH CV LOWER VASCULAR RIGHT COMMON FEMORAL SPONT: NORMAL
BH CV LOWER VASCULAR RIGHT DISTAL FEMORAL COMPRESS: NORMAL
BH CV LOWER VASCULAR RIGHT GASTRONEMIUS COMPRESS: NORMAL
BH CV LOWER VASCULAR RIGHT GREATER SAPH AK COMPRESS: NORMAL
BH CV LOWER VASCULAR RIGHT GREATER SAPH BK COMPRESS: NORMAL
BH CV LOWER VASCULAR RIGHT LESSER SAPH COMPRESS: NORMAL
BH CV LOWER VASCULAR RIGHT MID FEMORAL AUGMENT: NORMAL
BH CV LOWER VASCULAR RIGHT MID FEMORAL COMPETENT: NORMAL
BH CV LOWER VASCULAR RIGHT MID FEMORAL COMPRESS: NORMAL
BH CV LOWER VASCULAR RIGHT MID FEMORAL PHASIC: NORMAL
BH CV LOWER VASCULAR RIGHT MID FEMORAL SPONT: NORMAL
BH CV LOWER VASCULAR RIGHT PERONEAL COMPRESS: NORMAL
BH CV LOWER VASCULAR RIGHT POPLITEAL AUGMENT: NORMAL
BH CV LOWER VASCULAR RIGHT POPLITEAL COMPETENT: NORMAL
BH CV LOWER VASCULAR RIGHT POPLITEAL COMPRESS: NORMAL
BH CV LOWER VASCULAR RIGHT POPLITEAL PHASIC: NORMAL
BH CV LOWER VASCULAR RIGHT POPLITEAL SPONT: NORMAL
BH CV LOWER VASCULAR RIGHT POSTERIOR TIBIAL COMPRESS: NORMAL
BH CV LOWER VASCULAR RIGHT PROFUNDA FEMORAL COMPRESS: NORMAL
BH CV LOWER VASCULAR RIGHT PROXIMAL FEMORAL COMPRESS: NORMAL
BH CV LOWER VASCULAR RIGHT SAPHENOFEMORAL JUNCTION COMPRESS: NORMAL
MAXIMAL PREDICTED HEART RATE: 127 BPM
STRESS TARGET HR: 108 BPM

## 2022-11-07 PROCEDURE — 99213 OFFICE O/P EST LOW 20 MIN: CPT | Performed by: NURSE PRACTITIONER

## 2022-11-07 PROCEDURE — 93971 EXTREMITY STUDY: CPT

## 2022-11-07 NOTE — PROGRESS NOTES
"Chief Complaint  Leg Pain (Right leg x 1 month )    Dave Lagos presents to [facility]  As a 89 year old female c/o a 1 month history of pain and swelling in her right calf/ shin area.  Her family feels that the swelling has increased over the past week.  She states the area is tender to the touch.  She is on Eliquis, and has a history of A fib/ CVA.  She denies any sob, cough, no dizziness, or other c/o.  She has had no known injury.  She has been icing the area and wearing compression stockings.    She has no other c/o today    The following portions of the patient's history were reviewed and updated as appropriate: allergies, current medications, past family history, past medical history, past social history, past surgical history, and problem list    Review of Systems   Constitutional: Negative for chills, fatigue and fever.   Eyes: Negative for visual disturbance.   Respiratory: Negative for cough, shortness of breath and wheezing.    Cardiovascular: Positive for leg swelling. Negative for chest pain and palpitations.   Neurological: Negative for dizziness and light-headedness.        Objective   Vital Signs:   Vitals:    11/07/22 1457   BP: 153/78   Pulse: 64   SpO2: 99%   Weight: 57.6 kg (127 lb)   Height: 160 cm (62.99\")        BMI is within normal parameters. No other follow-up for BMI required.        Physical Exam  Vitals reviewed.   Constitutional:       General: She is not in acute distress.  Eyes:      Conjunctiva/sclera: Conjunctivae normal.   Neck:      Thyroid: No thyromegaly.      Vascular: No carotid bruit.   Cardiovascular:      Rate and Rhythm: Normal rate and regular rhythm.      Heart sounds: Normal heart sounds.   Pulmonary:      Effort: Pulmonary effort is normal.      Breath sounds: Normal breath sounds.   Skin:     Findings: Abrasion, bruising, ecchymosis and erythema present.             Comments: Mild swelling and tenderness to right lower " extremity.   Neurological:      Mental Status: She is alert.   Psychiatric:         Attention and Perception: Attention normal.         Mood and Affect: Mood normal.          Result Review :     The following data was reviewed by: ALIRIO Amanda on 11/07/2022:  Duplex Venous Lower Extremity - Right CAR (11/07/2022 15:25)           Assessment and Plan    Diagnoses and all orders for this visit:    1. Right calf pain (Primary)  Comments:  Venous Doppler to rule out DVT  Orders:  -     Duplex Venous Lower Extremity - Right CAR; Future    2. History of atrial fibrillation  Comments:  Currently on Eliquis      To [facility] at this time for venous Doppler stat/hold    Follow Up   Return if symptoms worsen or fail to improve, for Keep regular follow-up appointments.  Patient was given instructions and counseling regarding her condition or for health maintenance advice. Please see specific information pulled into the AVS if appropriate.

## 2022-11-07 NOTE — PROGRESS NOTES
The preliminary findings of today's right lower extremity venous duplex are negative for deep vein thrombosis. These preliminary findings were called to ALIRIO Amanda. Madelaine state to allow the patient to leave and she would call her.

## 2022-11-17 DIAGNOSIS — F41.9 ANXIETY: ICD-10-CM

## 2022-11-17 RX ORDER — AMLODIPINE BESYLATE 5 MG/1
5 TABLET ORAL DAILY
Qty: 90 TABLET | Refills: 1 | Status: SHIPPED | OUTPATIENT
Start: 2022-11-17 | End: 2023-03-08

## 2022-11-17 RX ORDER — PAROXETINE 10 MG/1
10 TABLET, FILM COATED ORAL EVERY MORNING
Qty: 90 TABLET | Refills: 3 | Status: SHIPPED | OUTPATIENT
Start: 2022-11-17 | End: 2022-12-15

## 2022-11-17 RX ORDER — PRAVASTATIN SODIUM 40 MG
40 TABLET ORAL DAILY
Qty: 90 TABLET | Refills: 1 | Status: SHIPPED | OUTPATIENT
Start: 2022-11-17

## 2022-11-28 ENCOUNTER — OFFICE VISIT (OUTPATIENT)
Dept: FAMILY MEDICINE CLINIC | Facility: CLINIC | Age: 87
End: 2022-11-28

## 2022-11-28 VITALS
OXYGEN SATURATION: 97 % | HEIGHT: 63 IN | HEART RATE: 61 BPM | WEIGHT: 124 LBS | BODY MASS INDEX: 21.97 KG/M2 | RESPIRATION RATE: 16 BRPM | SYSTOLIC BLOOD PRESSURE: 139 MMHG | DIASTOLIC BLOOD PRESSURE: 70 MMHG | TEMPERATURE: 97.6 F

## 2022-11-28 DIAGNOSIS — Z86.73 HISTORY OF STROKE: ICD-10-CM

## 2022-11-28 DIAGNOSIS — E21.3 HYPERPARATHYROIDISM: ICD-10-CM

## 2022-11-28 DIAGNOSIS — E78.2 MIXED HYPERLIPIDEMIA: ICD-10-CM

## 2022-11-28 DIAGNOSIS — F41.1 GAD (GENERALIZED ANXIETY DISORDER): ICD-10-CM

## 2022-11-28 DIAGNOSIS — Z86.79 HISTORY OF ATRIAL FIBRILLATION: ICD-10-CM

## 2022-11-28 DIAGNOSIS — E55.9 VITAMIN D DEFICIENCY: ICD-10-CM

## 2022-11-28 DIAGNOSIS — I10 ESSENTIAL HYPERTENSION: Primary | ICD-10-CM

## 2022-11-28 DIAGNOSIS — F41.9 ANXIETY: ICD-10-CM

## 2022-11-28 DIAGNOSIS — E83.52 HYPERCALCEMIA: ICD-10-CM

## 2022-11-28 PROCEDURE — 99214 OFFICE O/P EST MOD 30 MIN: CPT | Performed by: PHYSICIAN ASSISTANT

## 2022-11-28 RX ORDER — BUSPIRONE HYDROCHLORIDE 5 MG/1
TABLET ORAL
Qty: 180 TABLET | Refills: 3 | Status: SHIPPED | OUTPATIENT
Start: 2022-11-28

## 2022-11-28 NOTE — PROGRESS NOTES
"Subjective   Yolis Ospina is a 89 y.o. female.     History of Present Illness    Since the last visit, she has overall felt well.  She has Primary Hypertension and well controlled on current medication, Atrial Fibillation and remains under the care of their cardiologist for management, Vitamin D deficiency and labs are at goal >30 ng/mL and Mixed hyperlipidemia history of stroke sees neurology goal LDL less than 70 did not increase dose with last labs of statin due to her advanced age.  she has been compliant with current medications have reviewed them.  The patient denies medication side effects.  Will refill medications. /79   Pulse 61   Temp 97.6 °F (36.4 °C) (Skin)   Resp 16   Ht 160 cm (62.99\")   Wt 56.2 kg (124 lb)   LMP  (LMP Unknown)   SpO2 97%   BMI 21.97 kg/m²    Declines DEXA scan    Labs show mild renal impairment and just avoid NSAIDs like ibuprofen and Aleve.  Note her parathyroid is abnormal and so is her ionized calcium patient is aware of this and can refer to specialist for parathyroid consult if she desires.  She is aware of this and has declined treatment.  LDL cholesterol at goal.  Liver enzymes normal and vitamin levels in normal range.  Thyroid okay  Lab Results   Component Value Date    CHOL 161 12/28/2018    CHLPL 182 05/23/2022    TRIG 128 05/23/2022    HDL 79 05/23/2022    LDL 81 05/23/2022     Lab Results   Component Value Date    GLUCOSE 90 05/23/2022    BUN 14 05/23/2022    CREATININE 0.97 05/23/2022    EGFRIFNONA 55 (L) 08/13/2021    EGFRIFAFRI 67 08/13/2021    BCR 14 05/23/2022    K 4.3 05/23/2022    CO2 22 05/23/2022    CALCIUM 10.8 (H) 05/23/2022    PROTENTOTREF 7.5 05/23/2022    ALBUMIN 4.4 05/23/2022    LABIL2 1.4 05/23/2022    AST 17 05/23/2022    ALT 12 05/23/2022     Saw neuro 5-5-22  Assessment:   Moderate dementia  History of left MCA stroke  PAF, anticoagulated  Plan:   Continue Eliquis 2.5 mg twice daily, refilled today              Continue Aricept 5 mg " nightly and Namenda 10 mg twice daily              Blood pressure control to <130/80              Goal LDL <70-recommend high dose statins-                         Serum glucose < 140              Call 911 for stroke any stroke symptoms              Follow-up in 1 year or sooner if needed.  Diagnoses and all orders for this visit:     1. History of stroke (Primary)  -     apixaban (Eliquis) 2.5 MG tablet tablet; Take 1 tablet by mouth Every 12 (Twelve) Hours.  Dispense: 60 tablet; Refill: 6     2. Moderate dementia without behavioral disturbance (HCC)     3. Paroxysmal atrial fibrillation (HCC)     Other orders  -     donepezil (ARICEPT) 5 MG tablet; TAKE 1 TABLET BY MOUTH NIGHTLY  Dispense: 90 tablet; Refill: 3  Results for orders placed or performed during the hospital encounter of 11/07/22   Duplex Venous Lower Extremity - Right CAR   Result Value Ref Range    Target HR (85%) 108 bpm    Max. Pred. HR (100%) 127 bpm    Right Common Femoral Spont Y     Right Common Femoral Competent Y     Right Common Femoral Phasic Y     Right Common Femoral Compress C     Right Common Femoral Augment Y     Right Saphenofemoral Junction Compress C     Right Profunda Femoral Compress C     Right Proximal Femoral Compress C     Right Mid Femoral Spont Y     Right Mid Femoral Competent Y     Right Mid Femoral Phasic Y     Right Mid Femoral Compress C     Right Mid Femoral Augment Y     Right Distal Femoral Compress C     Right Popliteal Spont Y     Right Popliteal Competent Y     Right Popliteal Phasic Y     Right Popliteal Compress C     Right Popliteal Augment Y     Right Posterior Tibial Compress C     Right Peroneal Compress C     Right Gastronemius Compress C     Right Greater Saph AK Compress C     Right Greater Saph BK Compress C     Right Lesser Saph Compress C     Left Common Femoral Spont Y     Left Common Femoral Competent Y     Left Common Femoral Phasic Y     Left Common Femoral Compress C     Left Common Femoral Augment  Y      Keep Paxil and Buspar for anxiety and depression--works  Lives alone  Here with friend----[employer] [employer] [facility] 35    B12 was retested and oral working  Hx renal cancer===removed right kidney  Hx breast cancer  Hx stroke 2018---memory changes---see neurologist  Hx past PTH elevation---pt declines work up; no renal stones  Us thyroid==nodules----declines referral for f/u us     Also note patient has history of hyperparathyroidism with PTH level intact 5/26/2021 of 116 and continued elevation of calcium levels on labs.  Note that patient refuses referral to ENT Dr. Angel for further work-up and treatment.   Patient presented to emergency room on 5/31/2021 with complaint of elevated blood pressure.  CT of the head was performed and negative for any acute findings  Here with best friend    Saw cardio DR Birch 3-4-22  HPI: Yolis Ospina is a 89 yr/o female who presents today for cardiac evaluation. She was accompanied by her daughter. In the past she had been followed by Dr. Miller with [facility], but wished to switch her care to [facility]. The patient has a history of paroxysmal atrial fibrillation as well as essential hypertension. She is status post cerebrovascular accident that was presumably related to her atrial fibrillation. Her [id number] score is 6 and she is on chronic anticoagulation with apixaban. She is not on a beta-blocker or specific antiarrhythmic therapy, however.  The patient is evaluated for history of paroxysmal atrial fibrillation with a history of presumed cardioembolic stroke related to this. She is completely asymptomatic from a cardiac standpoint and very functional for her age. It is of note the patient is not on a beta-blocker or antiarrhythmic therapy with sinus bradycardia noted at present. Agree with continuing the patient on Eliquis given her increased CHADS2-VASc score which is 6 putting her at significant risk for recurrent stroke or other thrombolic events  from her paroxysmal atrial fibrillation. Her hypertension appears to be well controlled at present and no changes will be made in her current regimen.     Watch renal labs---right nephrectomy  Not on thyroid Rx---has been  The following portions of the patient's history were reviewed and updated as appropriate: allergies, current medications, past family history, past medical history, past social history, past surgical history and problem list.    Review of Systems   Constitutional: Negative for activity change, appetite change and unexpected weight change.   HENT: Negative for nosebleeds and trouble swallowing.    Eyes: Negative for pain and visual disturbance.   Respiratory: Negative for chest tightness, shortness of breath and wheezing.    Cardiovascular: Negative for chest pain and palpitations.   Gastrointestinal: Negative for abdominal pain and blood in stool.   Endocrine: Negative.    Genitourinary: Negative for difficulty urinating and hematuria.   Musculoskeletal: Negative for joint swelling.   Skin: Negative for color change and rash.   Allergic/Immunologic: Negative.    Neurological: Negative for syncope and speech difficulty.   Hematological: Negative for adenopathy.   Psychiatric/Behavioral: Negative for agitation and confusion.   All other systems reviewed and are negative.      Objective   Physical Exam  Vitals and nursing note reviewed.   Constitutional:       General: She is not in acute distress.     Appearance: She is well-developed. She is not ill-appearing or toxic-appearing.   HENT:      Head: Normocephalic.      Right Ear: External ear normal.      Left Ear: External ear normal.      Nose: Nose normal.      Mouth/Throat:      Pharynx: Oropharynx is clear.   Eyes:      General: No scleral icterus.     Conjunctiva/sclera: Conjunctivae normal.      Pupils: Pupils are equal, round, and reactive to light.   Neck:      Thyroid: No thyromegaly.      Vascular: No carotid bruit.   Cardiovascular:       Rate and Rhythm: Normal rate and regular rhythm.      Pulses: Normal pulses.      Heart sounds: Normal heart sounds. No murmur heard.  Pulmonary:      Effort: Pulmonary effort is normal. No respiratory distress.      Breath sounds: Normal breath sounds. No rales.   Musculoskeletal:         General: No deformity. Normal range of motion.      Cervical back: Normal range of motion and neck supple.      Right lower leg: Edema present.      Left lower leg: Edema present.      Comments: Trace pedal edema = bilat     Skin:     General: Skin is warm and dry.      Findings: No rash.   Neurological:      General: No focal deficit present.      Mental Status: She is alert and oriented to person, place, and time. Mental status is at baseline.   Psychiatric:         Mood and Affect: Mood normal.         Behavior: Behavior normal.         Thought Content: Thought content normal.         Judgment: Judgment normal.         Assessment & Plan   Diagnoses and all orders for this visit:    1. Essential hypertension (Primary)  Comments:  Well-controlled on Rx continue  Orders:  -     Comprehensive metabolic panel; Future  -     Lipid panel; Future  -     CBC and Differential; Future  -     TSH; Future  -     Vitamin B12; Future  -     Folate; Future  -     Vitamin D,25-Hydroxy; Future  -     T4, Free; Future    2. Hyperparathyroidism (HCC)  Comments:  Stable we will check labs yearly declines referral back to ENT Dr. nAgel  Orders:  -     Comprehensive metabolic panel; Future  -     Lipid panel; Future  -     CBC and Differential; Future  -     TSH; Future  -     Vitamin B12; Future  -     Folate; Future  -     Vitamin D,25-Hydroxy; Future  -     T4, Free; Future    3. Vitamin D deficiency  -     Comprehensive metabolic panel; Future  -     Lipid panel; Future  -     CBC and Differential; Future  -     TSH; Future  -     Vitamin B12; Future  -     Folate; Future  -     Vitamin D,25-Hydroxy; Future  -     T4, Free; Future    4. History  of atrial fibrillation  Comments:  Sees Dr. Birch cardiologist  Orders:  -     Comprehensive metabolic panel; Future  -     Lipid panel; Future  -     CBC and Differential; Future  -     TSH; Future  -     Vitamin B12; Future  -     Folate; Future  -     Vitamin D,25-Hydroxy; Future  -     T4, Free; Future    5. AARON (generalized anxiety disorder)  Comments:  Continue Paxil BuSpar working well  Orders:  -     Comprehensive metabolic panel; Future  -     Lipid panel; Future  -     CBC and Differential; Future  -     TSH; Future  -     Vitamin B12; Future  -     Folate; Future  -     Vitamin D,25-Hydroxy; Future  -     T4, Free; Future    6. History of stroke  Comments:  Per neurology  Orders:  -     Comprehensive metabolic panel; Future  -     Lipid panel; Future  -     CBC and Differential; Future  -     TSH; Future  -     Vitamin B12; Future  -     Folate; Future  -     Vitamin D,25-Hydroxy; Future  -     T4, Free; Future    7. Mixed hyperlipidemia  -     Comprehensive metabolic panel; Future  -     Lipid panel; Future  -     CBC and Differential; Future  -     TSH; Future  -     Vitamin B12; Future  -     Folate; Future  -     Vitamin D,25-Hydroxy; Future  -     T4, Free; Future    8. Hypercalcemia  Comments:  Stable we will check labs yearly declines referral back to ENT Dr. Angel  Orders:  -     Comprehensive metabolic panel; Future  -     Lipid panel; Future  -     CBC and Differential; Future  -     TSH; Future  -     Vitamin B12; Future  -     Folate; Future  -     Vitamin D,25-Hydroxy; Future  -     T4, Free; Future      Keep Paxil and Buspar for anxiety and depression--works  PTH high--declines work up  Meds for dementia through neurology  Treat A. fib remains on anticoagulant and sees cardiology yearly---DR Birch  Declines DEXA and mammo  Hx renal cancer----remission  Elevated PTH---declines referral back to Dr Angel  Do want LDL <70--hx CVA----did not increase statin dose d/t advanced age May  labs  Watching renal labs--likely dehydration May labs  Hx nephrectomy right  Plan, Yolis Ospina, was seen today.  she was seen for HTN and continue medication, Hyperlipidemia and will continue current medication, Atrial Fibrillation and remains on medication for treatment and is stable, Atrial Fibrillation and remains under the care of their cardiologist for medical management and is stable and Vitamin D deficiency and supplemented.         Answers for HPI/ROS submitted by the patient on 11/26/2022  Please describe your symptoms.: Follow up  Have you had these symptoms before?: No  How long have you been having these symptoms?: Greater than 2 weeks  What is the primary reason for your visit?: Other

## 2022-12-15 DIAGNOSIS — F41.9 ANXIETY: ICD-10-CM

## 2022-12-15 RX ORDER — PAROXETINE 10 MG/1
10 TABLET, FILM COATED ORAL EVERY MORNING
Qty: 90 TABLET | Refills: 3 | Status: SHIPPED | OUTPATIENT
Start: 2022-12-15

## 2023-03-08 ENCOUNTER — OFFICE VISIT (OUTPATIENT)
Dept: FAMILY MEDICINE CLINIC | Facility: CLINIC | Age: 88
End: 2023-03-08
Payer: MEDICARE

## 2023-03-08 VITALS
WEIGHT: 127 LBS | RESPIRATION RATE: 16 BRPM | OXYGEN SATURATION: 95 % | HEIGHT: 63 IN | TEMPERATURE: 97.4 F | HEART RATE: 69 BPM | DIASTOLIC BLOOD PRESSURE: 60 MMHG | SYSTOLIC BLOOD PRESSURE: 120 MMHG | BODY MASS INDEX: 22.5 KG/M2

## 2023-03-08 DIAGNOSIS — H81.10 BENIGN PAROXYSMAL POSITIONAL VERTIGO, UNSPECIFIED LATERALITY: Primary | ICD-10-CM

## 2023-03-08 DIAGNOSIS — I95.1 ORTHOSTASIS: ICD-10-CM

## 2023-03-08 PROCEDURE — G0439 PPPS, SUBSEQ VISIT: HCPCS | Performed by: PHYSICIAN ASSISTANT

## 2023-03-08 PROCEDURE — 1160F RVW MEDS BY RX/DR IN RCRD: CPT | Performed by: PHYSICIAN ASSISTANT

## 2023-03-08 PROCEDURE — 1170F FXNL STATUS ASSESSED: CPT | Performed by: PHYSICIAN ASSISTANT

## 2023-03-08 PROCEDURE — 1126F AMNT PAIN NOTED NONE PRSNT: CPT | Performed by: PHYSICIAN ASSISTANT

## 2023-03-08 PROCEDURE — 99213 OFFICE O/P EST LOW 20 MIN: CPT | Performed by: PHYSICIAN ASSISTANT

## 2023-03-08 RX ORDER — AMLODIPINE BESYLATE 2.5 MG/1
2.5 TABLET ORAL DAILY
Qty: 90 TABLET | Refills: 1 | Status: SHIPPED | OUTPATIENT
Start: 2023-03-08

## 2023-03-08 RX ORDER — MECLIZINE HCL 12.5 MG/1
TABLET ORAL
Qty: 30 TABLET | Refills: 1 | Status: SHIPPED | OUTPATIENT
Start: 2023-03-08

## 2023-03-08 NOTE — PATIENT INSTRUCTIONS
Medicare Wellness  Personal Prevention Plan of Service     Date of Office Visit:    Encounter Provider:  Tonya Malcolm PA-C  Place of Service:  [facility]  Patient Name: Yolis Ospina  :  10/25/1929    As part of the Medicare Wellness portion of your visit today, we are providing you with this personalized preventive plan of services (PPPS). This plan is based upon recommendations of the [place] Preventive Services Task Force (USPSTF) and the Advisory Committee on Immunization Practices (ACIP).    This lists the preventive care services that should be considered, and provides dates of when you are due. Items listed as completed are up-to-date and do not require any further intervention.    Health Maintenance   Topic Date Due    DXA SCAN  2015    ZOSTER VACCINE (2 of 2) 2017    MAMMOGRAM  2018    INFLUENZA VACCINE  2022    LIPID PANEL  2023    ANNUAL WELLNESS VISIT  2024    TDAP/TD VACCINES (2 - Td or Tdap) 2027    COVID-19 Vaccine  Completed    Pneumococcal Vaccine 65+  Completed       Orders Placed This Encounter   Procedures    Ambulatory Referral to Physical Therapy Evaluate and treat     Referral Priority:   Routine     Referral Type:   Physical Therapy     Referral Reason:   Specialty Services Required     Requested Specialty:   Physical Therapy     Number of Visits Requested:   1       No follow-ups on file.

## 2023-03-08 NOTE — PROGRESS NOTES
Subjective   Yolis Ospina is a 89 y.o. female.     History of Present Illness     Yolis Ospina 89 y.o. female who presents for evaluation of dizziness. The onset of symptoms were 1 week ago and are ongoing. The attacks occur daily and last a few seconds. Positions that worsen symptoms: bending over, standing up and turning head. Previous workup/treatments: none. Associated ear symptoms: none. Associated CNS symptoms: none. She denies asymmetrical movements, sensory loss, motor loss and vision changes.   Recent infections: none and some allergy c/o few days. Head trauma: denied.  126/80  Also concern with orthostatic blood pressure and due to her advanced age will allow systolic blood pressure to be 1 4150 and it staying in the 120--- she describes orthostatic hypotension when she stands--- more so from laying and when she bends over and stands back up..  I am concerned about her fall risk  Sees cardio for a fib    Saw neuro 5-5-22  Assessment:   Moderate dementia  History of left MCA stroke  PAF, anticoagulated  Plan:   Continue Eliquis 2.5 mg twice daily, refilled today              Continue Aricept 5 mg nightly and Namenda 10 mg twice daily              Blood pressure control to <130/80              Goal LDL <70-recommend high dose statins-                         Serum glucose < 140              Call 911 for stroke any stroke symptoms              Follow-up in 1 year or sooner if needed.  Diagnoses and all orders for this visit:  The following portions of the patient's history were reviewed and updated as appropriate: allergies, current medications, past family history, past medical history, past social history, past surgical history and problem list.    Review of Systems   Constitutional: Negative for activity change, appetite change and unexpected weight change.   HENT: Positive for postnasal drip. Negative for nosebleeds and trouble swallowing.    Eyes: Negative for pain and visual disturbance.    Respiratory: Negative for chest tightness, shortness of breath and wheezing.    Cardiovascular: Negative for chest pain and palpitations.   Gastrointestinal: Negative for abdominal pain and blood in stool.   Endocrine: Negative.    Genitourinary: Negative for difficulty urinating and hematuria.   Musculoskeletal: Negative for joint swelling.   Skin: Negative for color change and rash.   Allergic/Immunologic: Negative.    Neurological: Positive for dizziness. Negative for syncope and speech difficulty.   Hematological: Negative for adenopathy.   Psychiatric/Behavioral: Negative for agitation and confusion.   All other systems reviewed and are negative.      Objective   Physical Exam  Vitals and nursing note reviewed.   Constitutional:       General: She is not in acute distress.     Appearance: She is well-developed. She is not ill-appearing or toxic-appearing.   HENT:      Head: Normocephalic.      Right Ear: Tympanic membrane, ear canal and external ear normal.      Left Ear: Tympanic membrane, ear canal and external ear normal.      Ears:      Comments: Ear fluid     Nose: Nose normal.      Mouth/Throat:      Pharynx: Oropharynx is clear.   Eyes:      General: No scleral icterus.     Conjunctiva/sclera: Conjunctivae normal.      Pupils: Pupils are equal, round, and reactive to light.   Neck:      Thyroid: No thyromegaly.      Vascular: No carotid bruit.   Cardiovascular:      Rate and Rhythm: Normal rate and regular rhythm.      Pulses: Normal pulses.      Heart sounds: Normal heart sounds. No murmur heard.  Pulmonary:      Effort: Pulmonary effort is normal. No respiratory distress.      Breath sounds: Normal breath sounds. No rales.   Musculoskeletal:         General: No deformity. Normal range of motion.      Cervical back: Normal range of motion and neck supple.      Right lower leg: No edema.      Left lower leg: No edema.   Lymphadenopathy:      Cervical: No cervical adenopathy.   Skin:     General: Skin  is warm and dry.      Findings: No rash.   Neurological:      General: No focal deficit present.      Mental Status: She is alert and oriented to person, place, and time. Mental status is at baseline.      Coordination: Coordination abnormal.      Gait: Gait abnormal.   Psychiatric:         Mood and Affect: Mood normal.         Assessment & Plan   Diagnoses and all orders for this visit:    1. Benign paroxysmal positional vertigo, unspecified laterality (Primary)    2. Orthostasis          We will send meclizine at 12.5 mg 1-2 tabs every 6 hours as needed vertigo watch drowsiness and refer her to physical therapy for the repositioning of the crystals in the inner ear with her positional vertigo  Suspect orthostatic hypotension with her dizziness when she stands and sometimes when she is walking--this is more of a lightheadedness and concerned that her systolic blood pressure is too low especially for her age of 89  Okay to call or message me if blood pressure staying higher than the parameters we have discussed     Answers for HPI/ROS submitted by the patient on 3/7/2023  Please describe your symptoms.: Dizziness when I lay down, stand up or get out bed quickly.  Have you had these symptoms before?: No  How long have you been having these symptoms?: 5-7 days  Please list any medications you are currently taking for this condition.: Nknr  Please describe any probable cause for these symptoms. : I don’t know of any  What is the primary reason for your visit?: Other

## 2023-03-08 NOTE — PROGRESS NOTES
The ABCs of the Annual Wellness Visit  [facility] to Medicare Visit    Subjective   Yolis Ospina is a 89 y.o. female who presents for a  Welcome to Medicare Visit.    The following portions of the patient's history were reviewed and   updated as appropriate: allergies, current medications, past family history, past medical history, past social history, past surgical history and problem list.     Compared to one year ago, the patient feels her physical   health is the same.    Compared to one year ago, the patient feels her mental   health is the same.    Recent Hospitalizations:  She was not admitted to the hospital during the last year.       Current Medical Providers:  Patient Care Team:  Tonya Malcolm PA-C as PCP - General (Family Medicine)  Jay Rivera MD as Consulting Physician (Endocrinology)  Jesi Crawford APRN as Nurse Practitioner (Nurse Practitioner)  Cornel Birch MD as Consulting Physician (Cardiology)  Jonathan Angel MD as Consulting Physician (Otolaryngology)    Outpatient Medications Prior to Visit   Medication Sig Dispense Refill   • amLODIPine (NORVASC) 5 MG tablet TAKE 1 TABLET BY MOUTH DAILY. FOR BP 90 tablet 1   • apixaban (Eliquis) 2.5 MG tablet tablet Take 1 tablet by mouth Every 12 (Twelve) Hours. For a fib 180 tablet 3   • busPIRone (BUSPAR) 5 MG tablet 1 PO BID PRN anxiety 180 tablet 3   • Cyanocobalamin (VITAMIN B-12 PO) Take 1,000 mcg by mouth Daily.     • donepezil (ARICEPT) 5 MG tablet TAKE 1 TABLET BY MOUTH NIGHTLY 90 tablet 3   • fluorometholone (FML) 0.1 % ophthalmic suspension      • memantine (NAMENDA) 10 MG tablet TAKE 1 TABLET BY MOUTH 2 (TWO) TIMES A DAY. 180 tablet 3   • Omega-3 Fatty Acids (fish oil) 1000 MG capsule capsule Take  by mouth Daily With Breakfast.     • PARoxetine (PAXIL) 10 MG tablet TAKE 1 TABLET BY MOUTH EVERY MORNING. FOR STRESS 90 tablet 3   • pravastatin (PRAVACHOL) 40 MG tablet TAKE 1 TABLET BY MOUTH DAILY. FOR CHOLESTEROL 90 tablet 1   •  "vitamin D3 125 MCG (5000 UT) capsule capsule Take 1 capsule by mouth Daily.       No facility-administered medications prior to visit.       No opioid medication identified on active medication list. I have reviewed chart for other potential  high risk medication/s and harmful drug interactions in the elderly.          Aspirin is not on active medication list.  Aspirin use is not indicated based on review of current medical condition/s. Risk of harm outweighs potential benefits.  .    Patient Active Problem List   Diagnosis   • Fatigue   • Hypercalcemia   • Vitamin D deficiency   • Thyroid nodule   • Essential hypertension   • Hypothyroidism   • Hyperlipidemia   • Palpitation   • Anxiety   • Primary hyperparathyroidism (HCC)   • Dysarthria   • Cerebrovascular accident (CVA) due to thrombosis of left middle cerebral artery (HCC)   • History of atrial fibrillation   • Hospital discharge follow-up   • History of stroke   • Nocturnal hypoxemia   • Malignant neoplasm of right kidney (HCC)   • Gait disturbance, post-stroke   • Dyslipidemia   • AARON (generalized anxiety disorder)   • History of breast cancer   • History of kidney cancer   • Essential hypertension   • History of atrial fibrillation   • Hypothyroidism   • Hyperparathyroidism (HCC)   • Thyroid nodule   • Vitamin D deficiency   • Moderate dementia     Advance Care Planning  Advance Directive is not on file.  ACP discussion was held with the patient during this visit. Patient has an advance directive (not in EMR), copy requested.       Objective   Vitals:    03/08/23 1149   BP: 132/62   Pulse: 69   Resp: 16   Temp: 97.4 °F (36.3 °C)   SpO2: 95%   Weight: 57.6 kg (127 lb)   Height: 160 cm (62.99\")   PainSc: 0-No pain     Estimated body mass index is 22.5 kg/m² as calculated from the following:    Height as of this encounter: 160 cm (62.99\").    Weight as of this encounter: 57.6 kg (127 lb).    BMI is within normal parameters. No other follow-up for BMI " required.      Does the patient have evidence of cognitive impairment?   Yes: on Rx         Procedures       HEALTH RISK ASSESSMENT    Smoking Status:  Social History     Tobacco Use   Smoking Status Never   Smokeless Tobacco Never     Alcohol Consumption:  Social History     Substance and Sexual Activity   Alcohol Use Never       Fall Risk Screen:    JEANE Fall Risk Assessment was completed, and patient is at LOW risk for falls.Assessment completed on:3/8/2023    Depression Screen:   PHQ-2/PHQ-9 Depression Screening 3/8/2023   Little Interest or Pleasure in Doing Things 0-->not at all   Feeling Down, Depressed or Hopeless 0-->not at all   PHQ-9: Brief Depression Severity Measure Score 0       Health Habits and Functional and Cognitive Screening:  Functional & Cognitive Status 3/8/2023   Do you have difficulty preparing food and eating? No   Do you have difficulty bathing yourself, getting dressed or grooming yourself? No   Do you have difficulty using the toilet? No   Do you have difficulty moving around from place to place? No   Do you have trouble with steps or getting out of a bed or a chair? No   Current Diet Well Balanced Diet   Dental Exam Up to date   Eye Exam Up to date   Exercise (times per week) 5 times per week   Current Exercises Include Walking   Do you need help using the phone?  No   Are you deaf or do you have serious difficulty hearing?  No   Do you need help with transportation? Yes   Do you need help shopping? Yes   Do you need help preparing meals?  No   Do you need help with housework?  No   Do you need help with laundry? No   Do you need help taking your medications? Yes   Do you need help managing money? No   Do you ever drive or ride in a car without wearing a seat belt? No   Have you felt unusual stress, anger or loneliness in the last month? No   Who do you live with? Alone   If you need help, do you have trouble finding someone available to you? No   Have you been bothered in the last  four weeks by sexual problems? No   Do you have difficulty concentrating, remembering or making decisions? No       Visual Acuity:    No results found.    Age-appropriate Screening Schedule:  Refer to the list below for future screening recommendations based on patient's age, sex and/or medical conditions. Orders for these recommended tests are listed in the plan section. The patient has been provided with a written plan.    Health Maintenance   Topic Date Due   • DXA SCAN  09/24/2015   • ZOSTER VACCINE (2 of 2) 05/03/2017   • MAMMOGRAM  08/02/2018   • INFLUENZA VACCINE  08/01/2022   • ANNUAL WELLNESS VISIT  11/29/2022   • LIPID PANEL  05/23/2023   • TDAP/TD VACCINES (2 - Td or Tdap) 03/08/2027   • COVID-19 Vaccine  Completed   • Pneumococcal Vaccine 65+  Completed        CMS Preventative Services Quick Reference  Risk Factors Identified During Encounter    Fall Risk-High or Moderate: Discussed Fall Prevention in the home  The above risks/problems have been discussed with the patient.  Pertinent information has been shared with the patient in the After Visit Summary.  Follow up plans and orders are seen below in the Assessment/Plan Section.    There are no diagnoses linked to this encounter.    Follow Up:   Initial Medicare Visit in one year    An After Visit Summary and PPPS were made available to the patient.          Answers for HPI/ROS submitted by the patient on 3/7/2023  Please describe your symptoms.: Dizziness when I lay down, stand up or get out bed quickly.  Have you had these symptoms before?: No  How long have you been having these symptoms?: 5-7 days  Please list any medications you are currently taking for this condition.: Nknr  Please describe any probable cause for these symptoms. : I don’t know of any  What is the primary reason for your visit?: Other

## 2023-04-12 ENCOUNTER — TELEPHONE (OUTPATIENT)
Dept: NEUROLOGY | Facility: CLINIC | Age: 88
End: 2023-04-12
Payer: MEDICARE

## 2023-04-19 ENCOUNTER — TELEPHONE (OUTPATIENT)
Dept: NEUROLOGY | Facility: CLINIC | Age: 88
End: 2023-04-19
Payer: MEDICARE

## 2023-05-25 RX ORDER — PRAVASTATIN SODIUM 40 MG
40 TABLET ORAL DAILY
Qty: 90 TABLET | Refills: 1 | Status: SHIPPED | OUTPATIENT
Start: 2023-05-25

## 2023-05-25 RX ORDER — MEMANTINE HYDROCHLORIDE 10 MG/1
10 TABLET ORAL 2 TIMES DAILY
Qty: 180 TABLET | Refills: 1 | Status: SHIPPED | OUTPATIENT
Start: 2023-05-25 | End: 2024-05-24

## 2023-08-07 NOTE — PROGRESS NOTES
CC: f/u dementia, h/o stroke    HPI:  Yolis Ospina is a  89 y.o.  right-handed female with HTN, HLD, PAF on Eliquis 2.5 mg twice daily, history of breast cancer status postmastectomy/radiation 2008, kidney cancer status post nephrectomy in 1991, thyroid dysfunction, and vitamin D deficiency who is being seen in follow-up today for dementia and history of stroke.  She is accompanied by her friend Catherine.    In summer 2018 she was admitted to [facility] with with word finding difficulty.  She is not on antiplatelet or anticoagulation.  MRI brain showed multiple small acute cortical infarct scattered in the left cerebral hemisphere.  MRA head/neck showed nearly complete occlusion of the left M1 segment with somewhat irregular contour suggesting presence of acute thrombus.  Dr. Ling felt the patient had undiagnosed PAF and recommended empiric anticoagulation.  She was subsequently diagnosed with PAF and is followed by [facility].     In June 2020 she had an ED visit for some confusion was felt to have dementia.  CT head was negative at that time.  In June 2021 Arian cognitive assessment was completed and she scored 15 out of 30.  Today she scored 9 out of 30.  She is taking donepezil 5 mg nightly as she did not tolerate a higher dose.  She is also on memantine 10 mg twice daily.  She is no longer driving.  Her friend Catherine and her daughter via telephone check on her frequently.  Catherine manages her medications.  The patient still cooks for herself and has not had any accidents at home.  The patient is able to bathe and dress herself without any difficulty.  She sleeps well, no issues with wandering.  Her daughter did call in this morning to report that the patient is becoming more repetitive in speech.  Occasionally becomes agitated with changes to her routine.    No TIA or stroke symptoms in the last year.  She continues to take Eliquis 2.5 mg twice daily and is on Pravachol 40 mg daily.    In May  "her LDL was 67, CMP showed mildly elevated sodium of 146, calcium of 10.9, creatinine of 1.06, GFR 49, normal LFTs, CBC unremarkable, B12 1142, folate 19, vitamin D level 42.  Per note from PCP patient declined further workup for elevated PTH.    Past Medical History:   Diagnosis Date    Cataract 2012    Cerebrovascular accident (CVA) due to thrombosis of left middle cerebral artery     Depression 2000    H/O complete eye exam DUE    History of atrial fibrillation     History of breast cancer 08/13/2008    Right Breast, Ajacent to a 4.3x3.0x2.9cm biopsy cavity focus of in-situ and invasive ductal carcinoma spanning a length of approximately 0.4cm. Skin, nipple, margins negative. Lymphovascular invasion none seen. axillary tail lymph nodes(2) negatives, ER/WV +, HER2 -    History of colon polyps 11/01/2010    Cecal Polyp: Fragments of Adenomatous polyp w/ mild atypia    History of diverticulosis 11/01/2010    Sigmoid Colon    History of stroke     Hx of bone density study 09/24/2013    Hypercalcemia     Hyperlipidemia     Hypertension     Hypothyroid     Malignant neoplasm of right kidney     Palpitation     Primary hyperparathyroidism     Spondylolisthesis of lumbar region     at L4-5 level and spinal stenosis    Transient confusion     Vitamin D deficiency          Past Surgical History:   Procedure Laterality Date    BREAST EXCISIONAL BIOPSY Right 07/09/2008    Needle localized breast excisional biopsy(Infiltrating duct carcinoma moderately differentiated\"Dresden score 6/9\". Invasive tumor measures 1.2cm in greatest diameter. Intermediate to high nuclear grade ductal carcinoma in-situ without central necrosis, solid and cribriform patterns. In-situ and invasive carcinoma extend to the margins of excision. ER/WV (+), HER2 (-)-Dr. Cornel Tripp    BREAST SURGERY  1997    For breast cancer     COLONOSCOPY N/A 11/01/2010    One small cecal polyp, sigmoid diverticulosis, tortuous colon-Dr. Cornel Tripp    " COLONOSCOPY N/A 09/24/2004    Left colon diverticulosis-Dr. Cornel Tripp    COLONOSCOPY N/A 11/12/2012    Diverticulosis in the sigmoid colon and descending colon, Ileum caitie, Distal rectum & anal verge normal, repeat in 5 years-Dr. Cornel Tripp    FRACTURE SURGERY      Elbow replacement    HYSTERECTOMY  1989    LUMBAR EPIDURAL INJECTION N/A 01/09/2006, 1/24/2006    Steroids-Dr. Gen Ospina    LUMBAR EPIDURAL INJECTION N/A 12/08/2005    Steroids-Dr. Yuni Weinstein    MAMMO BILATERAL  10/2015    [facility]     MASTECTOMY Right 08/13/2008    Simple mastectomy-Dr. Cornel Tripp    NEPHRECTOMY  1991    Secondary to renal cancer     PAP SMEAR  DUE    THYROID BIOPSY Left 07/29/2014    Thyroid FNA( Follicular epithelial cells present, consistent with [place] category II. No evidence of papillary carcinoma-Dr. Billy Hernandez           Current Outpatient Medications:     amLODIPine (NORVASC) 2.5 MG tablet, Take 1 tablet by mouth Daily. For BP, Disp: 90 tablet, Rfl: 1    apixaban (Eliquis) 2.5 MG tablet tablet, Take 1 tablet by mouth Every 12 (Twelve) Hours. For a fib, Disp: 180 tablet, Rfl: 3    busPIRone (BUSPAR) 5 MG tablet, 1 PO BID PRN anxiety, Disp: 180 tablet, Rfl: 3    Cyanocobalamin (VITAMIN B-12 PO), Take 1,000 mcg by mouth Daily., Disp: , Rfl:     donepezil (ARICEPT) 5 MG tablet, TAKE 1 TABLET BY MOUTH NIGHTLY, Disp: 90 tablet, Rfl: 3    meclizine (ANTIVERT) 12.5 MG tablet, 1-2 tabs p.o. every 6 hours as needed dizziness, Disp: 30 tablet, Rfl: 1    memantine (NAMENDA) 10 MG tablet, TAKE 1 TABLET BY MOUTH 2 (TWO) TIMES A DAY., Disp: 180 tablet, Rfl: 1    Omega-3 Fatty Acids (fish oil) 1000 MG capsule capsule, Take  by mouth Daily With Breakfast., Disp: , Rfl:     PARoxetine (PAXIL) 10 MG tablet, TAKE 1 TABLET BY MOUTH EVERY MORNING. FOR STRESS, Disp: 90 tablet, Rfl: 3    pravastatin (PRAVACHOL) 40 MG tablet, TAKE 1 TABLET BY MOUTH DAILY. FOR CHOLESTEROL, Disp: 90 tablet, Rfl: 1    vitamin D3 125  "MCG (5000 UT) capsule capsule, Take 1 capsule by mouth Daily., Disp: , Rfl:     fluorometholone (FML) 0.1 % ophthalmic suspension, , Disp: , Rfl:       Family History   Problem Relation Age of Onset    Cancer Father     Cancer Sister     Heart disease Brother     Cancer Brother     Cancer Brother     Cancer Brother     Cancer Brother     Cancer Brother     Cancer Brother     Cancer Sister     Cancer Sister     Cancer Sister     Cancer Sister     Cancer Sister          Social History     Socioeconomic History    Marital status:    Tobacco Use    Smoking status: Never    Smokeless tobacco: Never   Substance and Sexual Activity    Alcohol use: Never    Drug use: Never    Sexual activity: Not Currently         Allergies   Allergen Reactions    Iodinated Contrast Media Hives             Physical Exam:  Vitals:    08/10/23 1105   BP: 102/60   Pulse: 54   SpO2: 94%   Weight: 57 kg (125 lb 9.6 oz)   Height: 160 cm (63\")     Orthostatic BP:    Body mass index is 22.25 kg/mý.    General appearance: Well developed, well nourished, well groomed, alert and cooperative.   HEENT: Normocephalic.   Cardiac: Regular rate and rhythm.   Peripheral Vasculature: Peripheral pulses are equal and symmetric.  Chest Exam: Clear to auscultation bilaterally, no wheezes, no rhonchi.  Extremities: Normal, no edema.   Skin: No rashes or birthmarks.     Higher integrative function: Awake and alert.  Oriented to month and date and place but not year or city.  Conversant, impoverished speech.  Some difficulty with complex commands.  No neglect.  CN II: Visual fields intact.  CN III IV VI: Extraocular movements are full without nystagmus. Pupils are equal, round, and reactive to light.   CN V: Normal facial sensation.  CN VII: Facial movements are symmetric, no weakness.   CN VIII: Auditory acuity is normal.   CN IX & X: Symmetric palatal movement.   CN XI: Sternocleidomastoid and trapezius are normal. No weakness.   CN XII: The tongue is " midline.    Motor: Normal muscle strength, bulk, and tone in upper and lower extremities. No fasciculations, rigidity, spasticity or abnormal movements.   Sensation: Intact to light touch in arms and legs.  Station and gait: Normal gait and station.   Coordination: Unable to test, difficulty following commands for coordination testing.      Results:      Lab Results   Component Value Date    GLUCOSE 78 05/25/2023    BUN 14 05/25/2023    CREATININE 1.06 (H) 05/25/2023    EGFRIFNONA 55 (L) 08/13/2021    EGFRIFAFRI 67 08/13/2021    BCR 13 05/25/2023    CO2 18 (L) 05/25/2023    CALCIUM 10.9 (H) 05/25/2023    PROTENTOTREF 7.0 05/25/2023    ALBUMIN 4.4 05/25/2023    LABIL2 1.7 05/25/2023    AST 19 05/25/2023    ALT 11 05/25/2023       Lab Results   Component Value Date    WBC 5.4 05/25/2023    HGB 13.3 05/25/2023    HCT 41.0 05/25/2023    MCV 92 05/25/2023     05/25/2023         .No results found for: RPR      Lab Results   Component Value Date    TSH 4.310 05/25/2023    A4PNWHH 6.6 05/16/2018         Lab Results   Component Value Date    JNLBNEWO58 1,142 05/25/2023         Lab Results   Component Value Date    FOLATE 19.0 05/25/2023         Lab Results   Component Value Date    HGBA1C 5.5 05/26/2021         Lab Results   Component Value Date    GLUCOSE 78 05/25/2023    BUN 14 05/25/2023    CREATININE 1.06 (H) 05/25/2023    EGFRIFNONA 55 (L) 08/13/2021    EGFRIFAFRI 67 08/13/2021    BCR 13 05/25/2023    K 4.6 05/25/2023    CO2 18 (L) 05/25/2023    CALCIUM 10.9 (H) 05/25/2023    PROTENTOTREF 7.0 05/25/2023    ALBUMIN 4.4 05/25/2023    LABIL2 1.7 05/25/2023    AST 19 05/25/2023    ALT 11 05/25/2023         Lab Results   Component Value Date    WBC 5.4 05/25/2023    HGB 13.3 05/25/2023    HCT 41.0 05/25/2023    MCV 92 05/25/2023     05/25/2023             Assessment/Plan:        Diagnoses and all orders for this visit:    1. Moderate vascular dementia without behavioral disturbance, psychotic disturbance, mood  disturbance, or anxiety (Primary)    2. History of stroke        For secondary stroke prevention:   Continue Eliquis 2.5 mg twice daily  Blood pressure control to <130/80   Goal LDL <70-continue Pravachol    Serum glucose < 140   Call 911 for stroke any stroke symptoms    Regarding dementia, likely combination of vascular and Alzheimer's   Continue donepezil 5 mg daily and Namenda 10 mg twice daily  Discussed importance of maintaining safety for patient, at some point she will need 24/7 supervision.  Catherine states at that point the plan is for the patient to move with her daughter in [place].     Follow-up in 1 year or sooner if needed    Greater than 30 minutes spent in review of the chart, face-to-face time with the patient, documentation.          Dictated utilizing Dragon dictation.

## 2023-08-10 ENCOUNTER — TELEPHONE (OUTPATIENT)
Dept: NEUROLOGY | Facility: CLINIC | Age: 88
End: 2023-08-10

## 2023-08-10 ENCOUNTER — OFFICE VISIT (OUTPATIENT)
Dept: NEUROLOGY | Facility: CLINIC | Age: 88
End: 2023-08-10
Payer: MEDICARE

## 2023-08-10 VITALS
DIASTOLIC BLOOD PRESSURE: 60 MMHG | OXYGEN SATURATION: 94 % | HEIGHT: 63 IN | BODY MASS INDEX: 22.25 KG/M2 | WEIGHT: 125.6 LBS | HEART RATE: 54 BPM | SYSTOLIC BLOOD PRESSURE: 102 MMHG

## 2023-08-10 DIAGNOSIS — Z86.73 HISTORY OF STROKE: ICD-10-CM

## 2023-08-10 DIAGNOSIS — F01.B0 MODERATE VASCULAR DEMENTIA WITHOUT BEHAVIORAL DISTURBANCE, PSYCHOTIC DISTURBANCE, MOOD DISTURBANCE, OR ANXIETY: Primary | ICD-10-CM

## 2023-08-10 PROBLEM — Z09 HOSPITAL DISCHARGE FOLLOW-UP: Status: RESOLVED | Noted: 2019-01-14 | Resolved: 2023-08-10

## 2023-08-10 PROBLEM — I10 ESSENTIAL HYPERTENSION: Status: RESOLVED | Noted: 2020-03-10 | Resolved: 2023-08-10

## 2023-08-10 PROBLEM — R47.1 DYSARTHRIA: Status: RESOLVED | Noted: 2018-12-27 | Resolved: 2023-08-10

## 2023-08-10 PROBLEM — Z86.79 HISTORY OF ATRIAL FIBRILLATION: Status: RESOLVED | Noted: 2019-01-14 | Resolved: 2023-08-10

## 2023-08-10 PROBLEM — E21.3 HYPERPARATHYROIDISM: Status: RESOLVED | Noted: 2020-03-10 | Resolved: 2023-08-10

## 2023-08-10 PROBLEM — E78.5 DYSLIPIDEMIA: Status: RESOLVED | Noted: 2020-03-10 | Resolved: 2023-08-10

## 2023-08-10 PROCEDURE — 99214 OFFICE O/P EST MOD 30 MIN: CPT | Performed by: NURSE PRACTITIONER

## 2023-08-10 PROCEDURE — 1159F MED LIST DOCD IN RCRD: CPT | Performed by: NURSE PRACTITIONER

## 2023-08-10 PROCEDURE — 1160F RVW MEDS BY RX/DR IN RCRD: CPT | Performed by: NURSE PRACTITIONER

## 2023-08-10 NOTE — TELEPHONE ENCOUNTER
Caller: Teresa Vlilalpando (POA)    Relationship: Emergency Contact    Best call back number: [phone]    What was the call regarding: PATIENT HAS BEEN REPEATING HERSELF A LOT MORE THAN SHE USED TO. SHE GETS IRRITATED AND FORGETS HER MEDICATION MORE FREQUENTLY ALSO. DAUGHTER LIVES IN [place] BUT WANTED YOU TO BE AWARE.    Is it okay if the provider responds through MyChart: NO

## 2023-08-11 NOTE — TELEPHONE ENCOUNTER
Called patient's daughter, Teresa, to discuss ALIRIO Mata's message and recommendations.  Left message to call back.

## 2023-08-27 RX ORDER — PRAVASTATIN SODIUM 40 MG
40 TABLET ORAL DAILY
Qty: 90 TABLET | Refills: 0 | Status: SHIPPED | OUTPATIENT
Start: 2023-08-27

## 2023-10-04 RX ORDER — DONEPEZIL HYDROCHLORIDE 5 MG/1
TABLET, FILM COATED ORAL
Qty: 90 TABLET | Refills: 2 | Status: SHIPPED | OUTPATIENT
Start: 2023-10-04

## 2023-10-09 ENCOUNTER — TELEPHONE (OUTPATIENT)
Dept: FAMILY MEDICINE CLINIC | Facility: CLINIC | Age: 88
End: 2023-10-09

## 2023-10-09 NOTE — TELEPHONE ENCOUNTER
Caller: Kwasi (POA)Teresa    Relationship: Emergency Contact    Best call back number: [phone]    What is the best time to reach you: ANY    Who are you requesting to speak with (clinical staff, provider,  specific staff member): CLINICAL STAFF    Do you know the name of the person who called: TERESA    What was the call regarding: THE LADY THAT OWNS SPECIAL LADY YURIDIA HAS BEEN FAXING OVER SO SHE CAN GET HER MASECTIMY BRAW.  IF YOU NEED IT THEIR PHONE NUMBER [phone].  IF YOU CAN PLEASE RETURN IT ASAP.  SHE IS GOING TO REFAX IT TODAY.    Is it okay if the provider responds through [employer]: NO

## 2023-11-30 ENCOUNTER — TELEPHONE (OUTPATIENT)
Dept: NEUROLOGY | Facility: CLINIC | Age: 88
End: 2023-11-30

## 2023-11-30 NOTE — TELEPHONE ENCOUNTER
Caller: KHUSHBOO    Relationship: DAUGHTER    Callback number: [phone]   Is it ok to leave a message: [x] Yes [] No    Requested medication for samples:   apixaban (Eliquis) 2.5 MG tablet     How much medication does the patient currently have left:   2 WEEKS LEFT AND THEY ARE REQUESTING 30 DAY SUPPLY.    Who will be picking up the samples: TANYA VILLALBA (TANYA WILL OUT ON 12-5-23 AND IS HOPING TO GET THE SAMPLES THEN)    Do you need information about patient financial assistance for this medication: [x] Yes [] No    Additional details provided:   PT HAS BEEN HAVING ISSUES WITH HER SHORT TERM MEMORY IN THE LAST MONTH AND A HALF.    PT HAS BEEN GETTING INSURANCE CALLS THAT CONFUSE HER. KHUSHBOO IS NEEDING A LETTER TO SAY THE PT ISN'T ABLE TO MAKE GOOD MEDICAL DECISIONS. HER POA WASN'T GOOD ENOUGH. PLEASE CALL KHUSHBOO TO DISCUSS.

## 2023-12-01 NOTE — TELEPHONE ENCOUNTER
I spoke with pt's daughter (Teresa) and samples of Eliquis are available for .  She voices understanding.

## 2023-12-07 RX ORDER — MEMANTINE HYDROCHLORIDE 10 MG/1
10 TABLET ORAL 2 TIMES DAILY
Qty: 180 TABLET | Refills: 3 | Status: SHIPPED | OUTPATIENT
Start: 2023-12-07 | End: 2024-12-06

## 2023-12-19 NOTE — TELEPHONE ENCOUNTER
Called number on file but was not able to LVM.  
Called pt's daughter - left Vm to return my call re: pt needing appt for med refills.    
Needs appointment  
Pt and [facility] is calling asking for refills levothyroxine and pravastatin.  Pt was due for f/u appt. In November 2019.  
Multiple falls in time period

## 2024-02-21 ENCOUNTER — TELEPHONE (OUTPATIENT)
Dept: FAMILY MEDICINE CLINIC | Facility: CLINIC | Age: 89
End: 2024-02-21
Payer: MEDICARE

## 2024-02-21 NOTE — TELEPHONE ENCOUNTER
Patient needs to make an appointment she needs to see me every 6 months.  I do not have her on any medications for diabetes and I do not know why she needs a glucometer.... Perhaps she has a reason and I need to discuss it with her

## 2024-02-21 NOTE — TELEPHONE ENCOUNTER
Caller: [facility] - [address] [facility] AT Portal to Registered [facility] - [phone]  - [phone] FX    Relationship: Pharmacy    Best call back number: [phone]     What was the call regarding: REQUESTING CLINICAL NOTES FROM LAST OFFICE VISIT REGARDING MEDICAL SUPPLIES-GLUCOSE MONITOR     FAX #[phone]

## 2024-02-23 NOTE — TELEPHONE ENCOUNTER
Called and spoke with patient.  She is not wanting a Glucometer and doesn't know why this was requested.  I did explain that she still needs appt because she needs to be seen every 6 months.  Pt states that she will call back and schedule appt

## 2024-02-27 ENCOUNTER — TELEPHONE (OUTPATIENT)
Dept: FAMILY MEDICINE CLINIC | Facility: CLINIC | Age: 89
End: 2024-02-27
Payer: MEDICARE

## 2024-06-20 ENCOUNTER — TELEPHONE (OUTPATIENT)
Dept: NEUROLOGY | Facility: CLINIC | Age: 89
End: 2024-06-20

## 2024-06-20 NOTE — TELEPHONE ENCOUNTER
Caller: Kwasi BAUMANN)Teresa    Relationship: Emergency Contact    Best call back number: [phone]     What is the best time to reach you: ANY    Who are you requesting to speak with (clinical staff, provider,  specific staff member): NIECY    What was the call regarding: PATIENTS DAUGHTER TELEPHONED TO SPEAK/UPDATE PROVIDER ON PATIENTS STATUS PRIOR TO PATIENTS UPCOMING VISIT IN 8/15/24.    PLEASE CALL -THANK YOU

## 2024-06-28 ENCOUNTER — TELEPHONE (OUTPATIENT)
Dept: NEUROLOGY | Facility: CLINIC | Age: 89
End: 2024-06-28
Payer: MEDICARE

## 2024-06-28 NOTE — TELEPHONE ENCOUNTER
Spoke to pt daughter (POA) in regards to r/s appt due to provider being out of office. Agreed to r/s appt to August 28th.     Pt daughter also requested to try and speak with ALIRIO Mata before pt appt. Stated she wanted to talk about symptoms.

## 2024-07-25 ENCOUNTER — TELEPHONE (OUTPATIENT)
Dept: NEUROLOGY | Facility: CLINIC | Age: 89
End: 2024-07-25

## 2024-07-25 NOTE — TELEPHONE ENCOUNTER
Caller: KHUSHBOO     Relationship: DAUGHTER /POA    Best call back number: [phone] (Mobile)     What medication are you requesting: Kisunla™ (donanemab-azbt) - IV Infusion    What are your current symptoms: MEMORY    Have you had these symptoms before:    [x] Yes  [] No    Have you been treated for these symptoms before:   [x] Yes  [] No    Additional notes: PT'S DAUGHTER WAS WONDERING IF PROVIDER THINKS PT IS A CANDIDATE FOR THE ABOVE MEDICATION.

## 2024-07-29 NOTE — TELEPHONE ENCOUNTER
I spoke with Teresa (daughter), and provider's response to medication question.  Pt's daughter voices understanding and agrees.

## 2024-08-22 NOTE — PROGRESS NOTES
CC: f/u dementia, h/o stroke    HPI:  Yolis Ospina is a  89 y.o.  right-handed female with HTN, HLD, PAF on Eliquis 2.5 mg twice daily, history of breast cancer status postmastectomy/radiation 2008, kidney cancer status post nephrectomy in 1991, thyroid dysfunction, and vitamin D deficiency who is being seen in follow-up today for dementia and history of stroke.  She is accompanied by her friend Catherine.  She was last seen by me in August 2023.     In summer 2018 she was admitted to [facility] with with word finding difficulty.  She was not on antiplatelet or anticoagulation.  MRI brain showed multiple small acute cortical infarct scattered in the left cerebral hemisphere.  MRA head/neck showed nearly complete occlusion of the left M1 segment with somewhat irregular contour suggesting presence of acute thrombus.  Dr. Ling felt the patient had undiagnosed PAF and recommended empiric anticoagulation.  She was subsequently diagnosed with PAF and is followed by [facility].  She continues to take Eliquis 2.5 mg twice daily.     In June 2020 she had an ED visit for some confusion was felt to have dementia.  CT head was negative at that time.  In June 2021 [place] cognitive assessment was completed and she scored 15 out of 30.  In August 2023 she scored 9 out of 30 on the [place].  She is taking donepezil 5 mg nightly she could not tolerate a higher dose.  She is also taking Namenda 10 mg twice daily.  She no longer drives.  Today Catherine indicates patient has had progression of her dementia and has had some infrequent hallucinations.  Patient talks to her daughter daily.  Catherine manages her medications and reports that the patient overall does well if she sticks to her routine.  She does become agitated with changes to her routine.    No TIA or stroke symptoms in the last year.  She has some unsteady balance but no falls in the last year.    She had labs completed in May 2023: B12 level 1142, folate 19, TSH  "4.3, LDL 67, vitamin D 42.6.    The above history was reviewed and updated.  Past Medical History:   Diagnosis Date    Cataract 2012    Cerebrovascular accident (CVA) due to thrombosis of left middle cerebral artery     Dementia 2018    Depression 2000    Difficulty walking 2019    H/O complete eye exam DUE    History of atrial fibrillation     History of breast cancer 08/13/2008    Right Breast, Ajacent to a 4.3x3.0x2.9cm biopsy cavity focus of in-situ and invasive ductal carcinoma spanning a length of approximately 0.4cm. Skin, nipple, margins negative. Lymphovascular invasion none seen. axillary tail lymph nodes(2) negatives, ER/[place] +, HER2 -    History of colon polyps 11/01/2010    Cecal Polyp: Fragments of Adenomatous polyp w/ mild atypia    History of diverticulosis 11/01/2010    Sigmoid Colon    History of stroke     HL (hearing loss)     Hx of bone density study 09/24/2013    Hypercalcemia     Hyperlipidemia     Hypertension     Hypothyroid     Malignant neoplasm of right kidney     Memory loss     Palpitation     Primary hyperparathyroidism     Spondylolisthesis of lumbar region     at L4-5 level and spinal stenosis    Transient confusion     Vitamin D deficiency          Past Surgical History:   Procedure Laterality Date    BREAST EXCISIONAL BIOPSY Right 07/09/2008    Needle localized breast excisional biopsy(Infiltrating duct carcinoma moderately differentiated\"[place] score 6/9\". Invasive tumor measures 1.2cm in greatest diameter. Intermediate to high nuclear grade ductal carcinoma in-situ without central necrosis, solid and cribriform patterns. In-situ and invasive carcinoma extend to the margins of excision. ER/[place] (+), HER2 (-)-Dr. Cornel Tripp    BREAST SURGERY  1997    For breast cancer     COLONOSCOPY N/A 11/01/2010    One small cecal polyp, sigmoid diverticulosis, tortuous colon-Dr. Cornel Tripp    COLONOSCOPY N/A 09/24/2004    Left colon diverticulosis-Dr. Cornel Tripp    COLONOSCOPY " N/A 11/12/2012    Diverticulosis in the sigmoid colon and descending colon, Ileum caitie, Distal rectum & anal verge normal, repeat in 5 years-Dr. Cornel Tripp    FRACTURE SURGERY      Elbow replacement    HYSTERECTOMY  1989    LUMBAR EPIDURAL INJECTION N/A 01/09/2006, 1/24/2006    Steroids-Dr. Gen Ospina    LUMBAR EPIDURAL INJECTION N/A 12/08/2005    Steroids-Dr. Yuni Weinstein    MAMMO BILATERAL  10/2015    [facility]     MASTECTOMY Right 08/13/2008    Simple mastectomy-Dr. Cornel Tripp    NEPHRECTOMY  1991    Secondary to renal cancer     PAP SMEAR  DUE    THYROID BIOPSY Left 07/29/2014    Thyroid FNA( Follicular epithelial cells present, consistent with [place] category II. No evidence of papillary carcinoma-Dr. Billy Hernandez           Current Outpatient Medications:     amLODIPine (NORVASC) 2.5 MG tablet, Take 1 tablet by mouth Daily. For BP, Disp: 90 tablet, Rfl: 1    apixaban (Eliquis) 2.5 MG tablet tablet, Take 1 tablet by mouth Every 12 (Twelve) Hours. For a fib, Disp: 56 tablet, Rfl: 0    busPIRone (BUSPAR) 5 MG tablet, 1 PO BID PRN anxiety, Disp: 180 tablet, Rfl: 3    Cyanocobalamin (VITAMIN B-12 PO), Take 1,000 mcg by mouth Daily., Disp: , Rfl:     donepezil (ARICEPT) 5 MG tablet, TAKE 1 TABLET BY MOUTH NIGHTLY, Disp: 90 tablet, Rfl: 3    fluorometholone (FML) 0.1 % ophthalmic suspension, , Disp: , Rfl:     memantine (NAMENDA) 10 MG tablet, TAKE 1 TABLET BY MOUTH 2 (TWO) TIMES A DAY., Disp: 180 tablet, Rfl: 3    Omega-3 Fatty Acids (fish oil) 1000 MG capsule capsule, Take  by mouth Daily With Breakfast., Disp: , Rfl:     pravastatin (PRAVACHOL) 40 MG tablet, TAKE 1 TABLET BY MOUTH DAILY. FOR CHOLESTEROL, Disp: 90 tablet, Rfl: 0    vitamin D3 125 MCG (5000 UT) capsule capsule, Take 1 capsule by mouth Daily., Disp: , Rfl:     meclizine (ANTIVERT) 12.5 MG tablet, 1-2 tabs p.o. every 6 hours as needed dizziness (Patient not taking: Reported on 8/28/2024), Disp: 30 tablet, Rfl: 1    PARoxetine  "(PAXIL) 10 MG tablet, TAKE 1 TABLET BY MOUTH EVERY MORNING. FOR STRESS (Patient not taking: Reported on 8/28/2024), Disp: 90 tablet, Rfl: 3      Family History   Problem Relation Age of Onset    Cancer Father     Cancer Sister     Heart disease Brother     Cancer Brother     Cancer Brother     Cancer Brother     Cancer Brother     Cancer Brother     Cancer Brother     Cancer Sister     Cancer Sister     Cancer Sister     Cancer Sister     Cancer Sister          Social History     Socioeconomic History    Marital status:    Tobacco Use    Smoking status: Never    Smokeless tobacco: Never   Vaping Use    Vaping status: Never Used   Substance and Sexual Activity    Alcohol use: Never    Drug use: Never    Sexual activity: Not Currently     Partners: Male         Allergies   Allergen Reactions    Iodinated Contrast Media Hives             Physical Exam:  Vitals:    08/28/24 1250   BP: 100/60   Pulse: 73   SpO2: 99%   Weight: 52.3 kg (115 lb 3.2 oz)   Height: 160 cm (63\")     Orthostatic BP:    Body mass index is 20.41 kg/m².    General appearance: Well developed, well nourished, well groomed, alert and cooperative.   HEENT: Normocephalic.   Cardiac: Regular rate and rhythm.   Chest Exam: Clear to auscultation bilaterally, no wheezes, no rhonchi.  Extremities: Normal, no edema.   Skin: No rashes or birthmarks.     Higher integrative function: Awake and alert, oriented to month and year but not current president or location.  Impaired recent/remote memory, attention/concentration.  Speech fluent but sparse.  Follows two-step commands but some difficulty with more complex commands.  CN II: Visual fields grossly intact.  CN III IV VI: Extraocular movements are full without nystagmus. Pupils are equal, round, and reactive to light.   CN V: Normal facial sensation.  CN VII: Facial movements are symmetric, no weakness.   CN VIII: Auditory acuity is normal.   CN IX & X: Symmetric palatal movement.   CN XI: " "Sternocleidomastoid and trapezius are normal. No weakness.   CN XII: The tongue is midline.   Motor: Normal muscle strength, bulk, and tone in upper and lower extremities. No fasciculations, rigidity, spasticity or abnormal movements.   Sensation: Symmetric to light touch in all extremities.  Station and gait: Comes to stand by pushing up with her arms, shortened step length, mildly unsteady gait.  Coordination: Finger to nose test showed no dysmetria.    Results:      Lab Results   Component Value Date    GLUCOSE 78 05/25/2023    BUN 14 05/25/2023    CREATININE 1.06 (H) 05/25/2023    EGFRIFNONA 55 (L) 08/13/2021    EGFRIFAFRI 67 08/13/2021    BCR 13 05/25/2023    CO2 18 (L) 05/25/2023    CALCIUM 10.9 (H) 05/25/2023    PROTENTOTREF 7.0 05/25/2023    ALBUMIN 4.4 05/25/2023    LABIL2 1.7 05/25/2023    AST 19 05/25/2023    ALT 11 05/25/2023       Lab Results   Component Value Date    WBC 5.4 05/25/2023    HGB 13.3 05/25/2023    HCT 41.0 05/25/2023    MCV 92 05/25/2023     05/25/2023         .No results found for: \"RPR\"      Lab Results   Component Value Date    TSH 4.310 05/25/2023    K4VOENJ 6.6 05/16/2018         Lab Results   Component Value Date    ZUHZLDHX93 1,142 05/25/2023         Lab Results   Component Value Date    FOLATE 19.0 05/25/2023         Lab Results   Component Value Date    HGBA1C 5.5 05/26/2021         Lab Results   Component Value Date    GLUCOSE 78 05/25/2023    BUN 14 05/25/2023    CREATININE 1.06 (H) 05/25/2023    EGFRIFNONA 55 (L) 08/13/2021    EGFRIFAFRI 67 08/13/2021    BCR 13 05/25/2023    K 4.6 05/25/2023    CO2 18 (L) 05/25/2023    CALCIUM 10.9 (H) 05/25/2023    PROTENTOTREF 7.0 05/25/2023    ALBUMIN 4.4 05/25/2023    LABIL2 1.7 05/25/2023    AST 19 05/25/2023    ALT 11 05/25/2023         Lab Results   Component Value Date    WBC 5.4 05/25/2023    HGB 13.3 05/25/2023    HCT 41.0 05/25/2023    MCV 92 05/25/2023     05/25/2023             Assessment/Plan:        Diagnoses and " all orders for this visit:    1. Moderate dementia without behavioral disturbance, psychotic disturbance, mood disturbance, or anxiety, unspecified dementia type (Primary)    2. History of stroke  -     apixaban (Eliquis) 2.5 MG tablet tablet; Take 1 tablet by mouth Every 12 (Twelve) Hours. For a fib  Dispense: 56 tablet; Refill: 0    Other orders  -     donepezil (ARICEPT) 5 MG tablet; TAKE 1 TABLET BY MOUTH NIGHTLY  Dispense: 90 tablet; Refill: 3      For dementia continue Aricept 5 mg nightly, Namenda 10 mg twice daily    For stroke prevention:  Continue Eliquis 2.5 mg twice daily   Blood pressure control to <140/90   Goal LDL <70-continue Pravachol 40 mg    Serum glucose < 140   Call 911 for stroke any stroke symptoms    In 1 year or sooner if needed follow-up      Total time: 31 minutes          Dictated utilizing Dragon dictation.

## 2024-08-28 ENCOUNTER — OFFICE VISIT (OUTPATIENT)
Dept: NEUROLOGY | Facility: CLINIC | Age: 89
End: 2024-08-28
Payer: MEDICARE

## 2024-08-28 VITALS
BODY MASS INDEX: 20.41 KG/M2 | OXYGEN SATURATION: 99 % | HEIGHT: 63 IN | WEIGHT: 115.2 LBS | HEART RATE: 73 BPM | SYSTOLIC BLOOD PRESSURE: 100 MMHG | DIASTOLIC BLOOD PRESSURE: 60 MMHG

## 2024-08-28 DIAGNOSIS — Z86.73 HISTORY OF STROKE: ICD-10-CM

## 2024-08-28 DIAGNOSIS — F03.B0 MODERATE DEMENTIA WITHOUT BEHAVIORAL DISTURBANCE, PSYCHOTIC DISTURBANCE, MOOD DISTURBANCE, OR ANXIETY, UNSPECIFIED DEMENTIA TYPE: Primary | ICD-10-CM

## 2024-08-28 PROBLEM — I48.0 PAROXYSMAL ATRIAL FIBRILLATION: Status: ACTIVE | Noted: 2023-12-08

## 2024-08-28 PROBLEM — R26.89 IMPAIRMENT OF BALANCE: Status: ACTIVE | Noted: 2024-06-06

## 2024-08-28 PROCEDURE — 99214 OFFICE O/P EST MOD 30 MIN: CPT | Performed by: NURSE PRACTITIONER

## 2024-08-28 RX ORDER — DONEPEZIL HYDROCHLORIDE 5 MG/1
TABLET, FILM COATED ORAL
Qty: 90 TABLET | Refills: 3 | Status: SHIPPED | OUTPATIENT
Start: 2024-08-28

## 2024-12-30 NOTE — OUTREACH NOTE
Stroke Week 3 Survey      Responses   Facility patient discharged from?  [place]   Does the patient have one of the following disease processes/diagnoses(primary or secondary)?  Stroke (TIA)   Week 3 attempt successful?  No   Call start time  1604   Unsuccessful attempts  Attempt 1 [Mobile phone number listed is incorrect]          Karina Lopez RN  
[phone]

## 2025-03-27 ENCOUNTER — TELEPHONE (OUTPATIENT)
Dept: NEUROLOGY | Facility: CLINIC | Age: OVER 89
End: 2025-03-27
Payer: MEDICARE

## 2025-03-27 RX ORDER — MEMANTINE HYDROCHLORIDE 10 MG/1
10 TABLET ORAL 2 TIMES DAILY
Qty: 180 TABLET | Refills: 3 | Status: SHIPPED | OUTPATIENT
Start: 2025-03-27

## 2025-03-27 NOTE — TELEPHONE ENCOUNTER
Provider: URIAH JOHNSON    Caller: Teresa Villalpando (POA)    Relationship to Patient: Emergency Contact    Phone Number: [phone]    Reason for Call: PT'S DAUGHTER IS CALLING TO SEE IF SHE CAN GET HOME PHYSICAL THERAPY SET UP FOR HER AND POSSIBLY GET HER A WALKER.  PLEASE CALL TO ADVISE     THANK YOU

## 2025-03-28 DIAGNOSIS — R26.81 UNSTEADY GAIT: ICD-10-CM

## 2025-03-28 DIAGNOSIS — Z86.73 HISTORY OF STROKE: Primary | ICD-10-CM

## 2025-03-31 NOTE — TELEPHONE ENCOUNTER
Caller: Teresa Villalpando (POA)    Relationship: Emergency Contact    Best call back number: [phone]    What is the medical concern/diagnosis: DEMENTIA    What specialty or service is being requested: HOME HEALTH    What is the provider, practice or medical service name: ?    What is the office location: [place]    What is the office phone number: ?    Any additional details: PATIENT'S DAUGHTER WOULD LIKE TO KNOW IF HOME HEALTH COULD COME BY 2-3 DAYS PER WEEK TO MAKE SURE SHE'S STAYING ON HER MEDICATIONS AND DOING HER EXERCISES.

## 2025-04-01 DIAGNOSIS — R26.81 UNSTEADY GAIT: Primary | ICD-10-CM

## 2025-04-01 DIAGNOSIS — F03.B0 MODERATE DEMENTIA WITHOUT BEHAVIORAL DISTURBANCE, PSYCHOTIC DISTURBANCE, MOOD DISTURBANCE, OR ANXIETY, UNSPECIFIED DEMENTIA TYPE: ICD-10-CM

## 2025-04-02 ENCOUNTER — OFFICE VISIT (OUTPATIENT)
Dept: INTERNAL MEDICINE | Facility: CLINIC | Age: OVER 89
End: 2025-04-02
Payer: MEDICARE

## 2025-04-02 VITALS
WEIGHT: 118 LBS | SYSTOLIC BLOOD PRESSURE: 138 MMHG | RESPIRATION RATE: 18 BRPM | BODY MASS INDEX: 20.91 KG/M2 | DIASTOLIC BLOOD PRESSURE: 80 MMHG | OXYGEN SATURATION: 97 % | HEIGHT: 63 IN | HEART RATE: 57 BPM

## 2025-04-02 DIAGNOSIS — I48.0 PAROXYSMAL ATRIAL FIBRILLATION: ICD-10-CM

## 2025-04-02 DIAGNOSIS — R26.9 GAIT DISTURBANCE, POST-STROKE: ICD-10-CM

## 2025-04-02 DIAGNOSIS — I10 ESSENTIAL HYPERTENSION: ICD-10-CM

## 2025-04-02 DIAGNOSIS — R26.89 BALANCE PROBLEM: Primary | ICD-10-CM

## 2025-04-02 DIAGNOSIS — I69.398 GAIT DISTURBANCE, POST-STROKE: ICD-10-CM

## 2025-04-02 DIAGNOSIS — E78.2 MIXED HYPERLIPIDEMIA: ICD-10-CM

## 2025-04-02 DIAGNOSIS — E55.9 VITAMIN D DEFICIENCY: ICD-10-CM

## 2025-04-02 LAB
25(OH)D3+25(OH)D2 SERPL-MCNC: 40.3 NG/ML (ref 30–100)
ALBUMIN SERPL-MCNC: 4 G/DL (ref 3.5–5.2)
ALBUMIN/GLOB SERPL: 1.3 G/DL
ALP SERPL-CCNC: 98 U/L (ref 39–117)
ALT SERPL-CCNC: 10 U/L (ref 1–33)
AST SERPL-CCNC: 18 U/L (ref 1–32)
BILIRUB SERPL-MCNC: 0.5 MG/DL (ref 0–1.2)
BUN SERPL-MCNC: 12 MG/DL (ref 8–23)
BUN/CREAT SERPL: 12.1 (ref 7–25)
CALCIUM SERPL-MCNC: 11.1 MG/DL (ref 8.2–9.6)
CHLORIDE SERPL-SCNC: 109 MMOL/L (ref 98–107)
CHOLEST SERPL-MCNC: 186 MG/DL (ref 0–200)
CHOLEST/HDLC SERPL: 2.51 {RATIO}
CO2 SERPL-SCNC: 25.1 MMOL/L (ref 22–29)
CREAT SERPL-MCNC: 0.99 MG/DL (ref 0.57–1)
EGFRCR SERPLBLD CKD-EPI 2021: 52.6 ML/MIN/1.73
ERYTHROCYTE [DISTWIDTH] IN BLOOD BY AUTOMATED COUNT: 12.8 % (ref 12.3–15.4)
GLOBULIN SER CALC-MCNC: 3 GM/DL
GLUCOSE SERPL-MCNC: 87 MG/DL (ref 65–99)
HCT VFR BLD AUTO: 39.9 % (ref 34–46.6)
HDLC SERPL-MCNC: 74 MG/DL (ref 40–60)
HGB BLD-MCNC: 13.2 G/DL (ref 12–15.9)
LDLC SERPL CALC-MCNC: 94 MG/DL (ref 0–100)
MCH RBC QN AUTO: 29.9 PG (ref 26.6–33)
MCHC RBC AUTO-ENTMCNC: 33.1 G/DL (ref 31.5–35.7)
MCV RBC AUTO: 90.5 FL (ref 79–97)
PLATELET # BLD AUTO: 227 10*3/MM3 (ref 140–450)
POTASSIUM SERPL-SCNC: 4.8 MMOL/L (ref 3.5–5.2)
PROT SERPL-MCNC: 7 G/DL (ref 6–8.5)
RBC # BLD AUTO: 4.41 10*6/MM3 (ref 3.77–5.28)
SODIUM SERPL-SCNC: 142 MMOL/L (ref 136–145)
TRIGL SERPL-MCNC: 104 MG/DL (ref 0–150)
VLDLC SERPL CALC-MCNC: 18 MG/DL (ref 5–40)
WBC # BLD AUTO: 5.02 10*3/MM3 (ref 3.4–10.8)

## 2025-04-02 NOTE — PROGRESS NOTES
"Chief Complaint  Establish Care (Balance issues taken care of by another Provider/Discussion on Home Health visits)    Subjective        Yolis Ospina presents to [facility]  History of Present Illness  This is a 89-year-old female with chronic medical conditions of hypertension, hyperlipidemia, paroxysmal atrial fibrillation, vitamin D deficiency, generalized anxiety, history of stroke with residual balance issues who presents to establish care.  She is accompanied by friend Catherine (daughter is out of state).    Balance issues in the setting of previous CVA: She is taking 40 mg pravastatin.  She follows with neurology regarding this and memory issues.  She is on 10 mg of amantadine.  Stroke was diagnosed in 2018 when she was admitted with word finding difficulty.  Paroxysmal atrial fibrillation: She is on 2.5 mg Eliquis twice a day.  Denies issues with bleeding.  Anxiety: Then prescribed 10 mg paroxetine but has not been taking consistently.  Just had home health for medication management in the setting of memory issues.  Hypertension: This is well-controlled on 2.5 mg amlodipine daily  Vitamin D deficiency: She is on replacement  Notes she also has a history of breast cancer status post right mastectomy and radiation to has an 8.  Has a history of kidney cancer status post right nephrectomy proximately 30 years ago.    Objective   Vital Signs:  /80 (BP Location: Right arm, Patient Position: Sitting, Cuff Size: Small Adult)   Pulse 57   Resp 18   Ht 160 cm (62.99\")   Wt 53.5 kg (118 lb)   SpO2 97%   BMI 20.91 kg/m²   Estimated body mass index is 20.91 kg/m² as calculated from the following:    Height as of this encounter: 160 cm (62.99\").    Weight as of this encounter: 53.5 kg (118 lb).    BMI is within normal parameters. No other follow-up for BMI required.      Physical Exam  Vitals reviewed.   Constitutional:       General: She is not in acute distress.     Appearance: " Normal appearance.   Cardiovascular:      Rate and Rhythm: Normal rate and regular rhythm.   Pulmonary:      Effort: Pulmonary effort is normal.      Breath sounds: Normal breath sounds.   Skin:     General: Skin is warm and dry.   Neurological:      Mental Status: She is alert.      Cranial Nerves: No facial asymmetry.      Comments: Speech is coherent but sparse     Psychiatric:         Mood and Affect: Mood normal.         Judgment: Judgment normal.       Result Review :                Assessment and Plan   Diagnoses and all orders for this visit:    1. Balance problem (Primary)  -     Ambulatory Referral to Home Health    2. Mixed hyperlipidemia  -     Comprehensive Metabolic Panel  -     Lipid Panel With / Chol / HDL Ratio    3. Essential hypertension  -     Comprehensive Metabolic Panel  -     Ambulatory Referral to Home Health    4. Paroxysmal atrial fibrillation  -     CBC (No Diff)    5. Vitamin D deficiency  -     Vitamin D,25-Hydroxy    6. Gait disturbance, post-stroke  -     Ambulatory Referral to Home Health             Follow Up   Return in about 1 year (around 4/2/2026) for Medicare Wellness.  Patient was given instructions and counseling regarding her condition or for health maintenance advice. Please see specific information pulled into the AVS if appropriate.

## 2025-04-03 ENCOUNTER — TELEPHONE (OUTPATIENT)
Dept: NEUROLOGY | Facility: CLINIC | Age: OVER 89
End: 2025-04-03
Payer: MEDICARE

## 2025-04-03 NOTE — TELEPHONE ENCOUNTER
[facility]ice called again.  They cannot take orders from Jesi since the patient has not been seen in the office since last year.  They are going to call her PCP and see if they will order HH.

## 2025-04-07 NOTE — TELEPHONE ENCOUNTER
4/4 patients pcp placed a order for patient to receive Home Health. I will close order that our provider placed due to not being seen since 2024.

## 2025-04-08 ENCOUNTER — TELEPHONE (OUTPATIENT)
Dept: INTERNAL MEDICINE | Facility: CLINIC | Age: OVER 89
End: 2025-04-08
Payer: MEDICARE

## 2025-04-08 NOTE — TELEPHONE ENCOUNTER
Caller: HUSSEIN    Relationship: [employer] [facility]    Best call back number: [phone]     What orders are you requesting (i.e. lab or imaging): PHYSICAL THERAPY 1 TIME A WEEK FOR 9 WEEKS, AND ADD SPEECH THERAPY FOR COGNITION    Additional notes: DID EVALUATION FOR PATIENT TODAY. WOULD LIKE TO CONTINUE PHYSICAL THERAPY AND ADD SPEECH THERAPY AS PATIENT SEEMS TO HAVE SIGNIFICANT MEMORY ISSUES AND LIVES TOTALLY ALONE. REQUESTS CALL BACK WITH VERBAL ORDERS FOR HOME HEALTH

## 2025-07-16 ENCOUNTER — TELEPHONE (OUTPATIENT)
Dept: NEUROLOGY | Facility: CLINIC | Age: OVER 89
End: 2025-07-16
Payer: MEDICARE

## 2025-07-16 NOTE — TELEPHONE ENCOUNTER
Spoke to Pt daughter Teresa (POA) to r/s appt on 8/14 due to Jesi Ty being out off office. Pt agreed to r/s for 10/10 @ 1:30.     Teresa stated she will need to check with anges' transportation to make sure that works and will call back if there are any issuses.

## 2025-08-13 DIAGNOSIS — Z86.73 HISTORY OF STROKE: ICD-10-CM

## 2025-08-13 DIAGNOSIS — F41.9 ANXIETY: ICD-10-CM

## 2025-08-13 RX ORDER — PAROXETINE 10 MG/1
10 TABLET, FILM COATED ORAL EVERY MORNING
Qty: 90 TABLET | Refills: 1 | Status: SHIPPED | OUTPATIENT
Start: 2025-08-13

## 2025-08-13 RX ORDER — AMLODIPINE BESYLATE 2.5 MG/1
2.5 TABLET ORAL DAILY
Qty: 90 TABLET | Refills: 1 | Status: SHIPPED | OUTPATIENT
Start: 2025-08-13

## 2025-08-13 RX ORDER — BUSPIRONE HYDROCHLORIDE 5 MG/1
TABLET ORAL
Qty: 180 TABLET | Refills: 1 | Status: SHIPPED | OUTPATIENT
Start: 2025-08-13

## 2025-08-13 RX ORDER — PRAVASTATIN SODIUM 40 MG
40 TABLET ORAL DAILY
Qty: 90 TABLET | Refills: 1 | Status: SHIPPED | OUTPATIENT
Start: 2025-08-13